# Patient Record
Sex: FEMALE | Race: BLACK OR AFRICAN AMERICAN | URBAN - METROPOLITAN AREA
[De-identification: names, ages, dates, MRNs, and addresses within clinical notes are randomized per-mention and may not be internally consistent; named-entity substitution may affect disease eponyms.]

---

## 2023-02-01 ENCOUNTER — EMERGENCY (EMERGENCY)
Facility: HOSPITAL | Age: 18
LOS: 0 days | Discharge: HOME | End: 2023-02-01
Attending: EMERGENCY MEDICINE | Admitting: EMERGENCY MEDICINE
Payer: MEDICAID

## 2023-02-01 ENCOUNTER — EMERGENCY (EMERGENCY)
Facility: HOSPITAL | Age: 18
LOS: 0 days | Discharge: HOME | End: 2023-02-01
Attending: PEDIATRICS | Admitting: PEDIATRICS
Payer: MEDICAID

## 2023-02-01 VITALS
OXYGEN SATURATION: 100 % | HEART RATE: 69 BPM | RESPIRATION RATE: 20 BRPM | SYSTOLIC BLOOD PRESSURE: 122 MMHG | DIASTOLIC BLOOD PRESSURE: 57 MMHG | TEMPERATURE: 98 F

## 2023-02-01 VITALS
RESPIRATION RATE: 18 BRPM | WEIGHT: 259.93 LBS | HEART RATE: 90 BPM | TEMPERATURE: 98 F | DIASTOLIC BLOOD PRESSURE: 80 MMHG | OXYGEN SATURATION: 99 % | SYSTOLIC BLOOD PRESSURE: 143 MMHG

## 2023-02-01 VITALS
DIASTOLIC BLOOD PRESSURE: 47 MMHG | SYSTOLIC BLOOD PRESSURE: 106 MMHG | OXYGEN SATURATION: 100 % | RESPIRATION RATE: 20 BRPM | TEMPERATURE: 98 F | HEART RATE: 70 BPM

## 2023-02-01 VITALS
HEART RATE: 73 BPM | OXYGEN SATURATION: 100 % | TEMPERATURE: 98 F | DIASTOLIC BLOOD PRESSURE: 58 MMHG | SYSTOLIC BLOOD PRESSURE: 119 MMHG | RESPIRATION RATE: 18 BRPM

## 2023-02-01 DIAGNOSIS — K80.20 CALCULUS OF GALLBLADDER WITHOUT CHOLECYSTITIS WITHOUT OBSTRUCTION: ICD-10-CM

## 2023-02-01 DIAGNOSIS — R74.01 ELEVATION OF LEVELS OF LIVER TRANSAMINASE LEVELS: ICD-10-CM

## 2023-02-01 DIAGNOSIS — R79.89 OTHER SPECIFIED ABNORMAL FINDINGS OF BLOOD CHEMISTRY: ICD-10-CM

## 2023-02-01 DIAGNOSIS — R11.0 NAUSEA: ICD-10-CM

## 2023-02-01 DIAGNOSIS — R10.9 UNSPECIFIED ABDOMINAL PAIN: ICD-10-CM

## 2023-02-01 DIAGNOSIS — R10.13 EPIGASTRIC PAIN: ICD-10-CM

## 2023-02-01 LAB
ALBUMIN SERPL ELPH-MCNC: 4 G/DL — SIGNIFICANT CHANGE UP (ref 3.5–5.2)
ALP SERPL-CCNC: 142 U/L — HIGH (ref 30–115)
ALT FLD-CCNC: 119 U/L — HIGH (ref 14–37)
ANION GAP SERPL CALC-SCNC: 9 MMOL/L — SIGNIFICANT CHANGE UP (ref 7–14)
APPEARANCE UR: CLEAR — SIGNIFICANT CHANGE UP
AST SERPL-CCNC: 173 U/L — HIGH (ref 14–37)
BASOPHILS # BLD AUTO: 0.04 K/UL — SIGNIFICANT CHANGE UP (ref 0–0.2)
BASOPHILS NFR BLD AUTO: 0.4 % — SIGNIFICANT CHANGE UP (ref 0–1)
BILIRUB SERPL-MCNC: 0.7 MG/DL — SIGNIFICANT CHANGE UP (ref 0.2–1.2)
BILIRUB UR-MCNC: NEGATIVE — SIGNIFICANT CHANGE UP
BUN SERPL-MCNC: 10 MG/DL — SIGNIFICANT CHANGE UP (ref 10–20)
CALCIUM SERPL-MCNC: 9.3 MG/DL — SIGNIFICANT CHANGE UP (ref 8.4–10.5)
CHLORIDE SERPL-SCNC: 103 MMOL/L — SIGNIFICANT CHANGE UP (ref 98–110)
CO2 SERPL-SCNC: 27 MMOL/L — SIGNIFICANT CHANGE UP (ref 17–32)
COLOR SPEC: YELLOW — SIGNIFICANT CHANGE UP
CREAT SERPL-MCNC: 0.7 MG/DL — SIGNIFICANT CHANGE UP (ref 0.3–1)
DIFF PNL FLD: NEGATIVE — SIGNIFICANT CHANGE UP
EGFR: 128 ML/MIN/1.73M2 — SIGNIFICANT CHANGE UP
EOSINOPHIL # BLD AUTO: 0.02 K/UL — SIGNIFICANT CHANGE UP (ref 0–0.7)
EOSINOPHIL NFR BLD AUTO: 0.2 % — SIGNIFICANT CHANGE UP (ref 0–8)
GLUCOSE SERPL-MCNC: 83 MG/DL — SIGNIFICANT CHANGE UP (ref 70–99)
GLUCOSE UR QL: NEGATIVE — SIGNIFICANT CHANGE UP
HCG SERPL QL: NEGATIVE — SIGNIFICANT CHANGE UP
HCT VFR BLD CALC: 36.1 % — LOW (ref 37–47)
HGB BLD-MCNC: 12.1 G/DL — SIGNIFICANT CHANGE UP (ref 12–16)
IMM GRANULOCYTES NFR BLD AUTO: 0.7 % — HIGH (ref 0.1–0.3)
KETONES UR-MCNC: NEGATIVE — SIGNIFICANT CHANGE UP
LEUKOCYTE ESTERASE UR-ACNC: NEGATIVE — SIGNIFICANT CHANGE UP
LIDOCAIN IGE QN: 14 U/L — SIGNIFICANT CHANGE UP (ref 7–60)
LYMPHOCYTES # BLD AUTO: 3.08 K/UL — SIGNIFICANT CHANGE UP (ref 1.2–3.4)
LYMPHOCYTES # BLD AUTO: 31.3 % — SIGNIFICANT CHANGE UP (ref 20.5–51.1)
MCHC RBC-ENTMCNC: 26.2 PG — LOW (ref 27–31)
MCHC RBC-ENTMCNC: 33.5 G/DL — SIGNIFICANT CHANGE UP (ref 32–37)
MCV RBC AUTO: 78.1 FL — LOW (ref 81–99)
MONOCYTES # BLD AUTO: 0.62 K/UL — HIGH (ref 0.1–0.6)
MONOCYTES NFR BLD AUTO: 6.3 % — SIGNIFICANT CHANGE UP (ref 1.7–9.3)
NEUTROPHILS # BLD AUTO: 6.01 K/UL — SIGNIFICANT CHANGE UP (ref 1.4–6.5)
NEUTROPHILS NFR BLD AUTO: 61.1 % — SIGNIFICANT CHANGE UP (ref 42.2–75.2)
NITRITE UR-MCNC: NEGATIVE — SIGNIFICANT CHANGE UP
NRBC # BLD: 0 /100 WBCS — SIGNIFICANT CHANGE UP (ref 0–0)
PH UR: 6.5 — SIGNIFICANT CHANGE UP (ref 5–8)
PLATELET # BLD AUTO: 298 K/UL — SIGNIFICANT CHANGE UP (ref 130–400)
POTASSIUM SERPL-MCNC: 4 MMOL/L — SIGNIFICANT CHANGE UP (ref 3.5–5)
POTASSIUM SERPL-SCNC: 4 MMOL/L — SIGNIFICANT CHANGE UP (ref 3.5–5)
PROT SERPL-MCNC: 6.7 G/DL — SIGNIFICANT CHANGE UP (ref 6.1–8)
PROT UR-MCNC: SIGNIFICANT CHANGE UP
RBC # BLD: 4.62 M/UL — SIGNIFICANT CHANGE UP (ref 4.2–5.4)
RBC # FLD: 14.8 % — HIGH (ref 11.5–14.5)
SODIUM SERPL-SCNC: 139 MMOL/L — SIGNIFICANT CHANGE UP (ref 135–146)
SP GR SPEC: 1.02 — SIGNIFICANT CHANGE UP (ref 1.01–1.03)
UROBILINOGEN FLD QL: SIGNIFICANT CHANGE UP
WBC # BLD: 9.84 K/UL — SIGNIFICANT CHANGE UP (ref 4.8–10.8)
WBC # FLD AUTO: 9.84 K/UL — SIGNIFICANT CHANGE UP (ref 4.8–10.8)

## 2023-02-01 PROCEDURE — 99284 EMERGENCY DEPT VISIT MOD MDM: CPT

## 2023-02-01 PROCEDURE — 76705 ECHO EXAM OF ABDOMEN: CPT | Mod: 26

## 2023-02-01 PROCEDURE — 74177 CT ABD & PELVIS W/CONTRAST: CPT | Mod: 26,MA

## 2023-02-01 RX ORDER — SODIUM CHLORIDE 9 MG/ML
1000 INJECTION, SOLUTION INTRAVENOUS ONCE
Refills: 0 | Status: COMPLETED | OUTPATIENT
Start: 2023-02-01 | End: 2023-02-01

## 2023-02-01 RX ORDER — DIATRIZOATE MEGLUMINE 180 MG/ML
30 INJECTION, SOLUTION INTRAVESICAL ONCE
Refills: 0 | Status: COMPLETED | OUTPATIENT
Start: 2023-02-01 | End: 2023-02-01

## 2023-02-01 RX ORDER — ONDANSETRON 8 MG/1
4 TABLET, FILM COATED ORAL ONCE
Refills: 0 | Status: COMPLETED | OUTPATIENT
Start: 2023-02-01 | End: 2023-02-01

## 2023-02-01 RX ORDER — IBUPROFEN 200 MG
400 TABLET ORAL ONCE
Refills: 0 | Status: COMPLETED | OUTPATIENT
Start: 2023-02-01 | End: 2023-02-01

## 2023-02-01 RX ORDER — FAMOTIDINE 10 MG/ML
1 INJECTION INTRAVENOUS
Qty: 14 | Refills: 0
Start: 2023-02-01 | End: 2023-02-07

## 2023-02-01 RX ORDER — FAMOTIDINE 10 MG/ML
20 INJECTION INTRAVENOUS ONCE
Refills: 0 | Status: COMPLETED | OUTPATIENT
Start: 2023-02-01 | End: 2023-02-01

## 2023-02-01 RX ORDER — ONDANSETRON 8 MG/1
1 TABLET, FILM COATED ORAL
Qty: 6 | Refills: 0
Start: 2023-02-01 | End: 2023-02-02

## 2023-02-01 RX ADMIN — DIATRIZOATE MEGLUMINE 30 MILLILITER(S): 180 INJECTION, SOLUTION INTRAVESICAL at 05:18

## 2023-02-01 RX ADMIN — ONDANSETRON 4 MILLIGRAM(S): 8 TABLET, FILM COATED ORAL at 13:02

## 2023-02-01 RX ADMIN — FAMOTIDINE 20 MILLIGRAM(S): 10 INJECTION INTRAVENOUS at 13:02

## 2023-02-01 RX ADMIN — Medication 400 MILLIGRAM(S): at 05:43

## 2023-02-01 RX ADMIN — SODIUM CHLORIDE 1000 MILLILITER(S): 9 INJECTION, SOLUTION INTRAVENOUS at 13:03

## 2023-02-01 NOTE — ED PROVIDER NOTE - NSFOLLOWUPINSTRUCTIONS_ED_ALL_ED_FT
Cholelithiasis    Cholelithiasis is a form of gallbladder disease in which gallstones form in the gallbladder. The gallbladder is an organ that stores bile. Bile is made in the liver, and it helps to digest fats. Gallstones begin as small crystals and slowly grow into stones. They may cause no symptoms until the gallbladder tightens (contracts) and a gallstone is blocking the duct (gallbladder attack), which can cause pain. Cholelithiasis is also referred to as gallstones.  There are two main types of gallstones:  Cholesterol stones. These are made of hardened cholesterol and are usually yellow-green in color. They are the most common type of gallstone. Cholesterol is a white, waxy, fat-like substance that is made in the liver. Pigment stones. These are dark in color and are made of a red-yellow substance that forms when hemoglobin from red blood cells breaks down (bilirubin).What are the causes?  This condition may be caused by an imbalance in the substances that bile is made of. This can happen if the bile:  Has too much bilirubin. Has too much cholesterol. Does not have enough bile salts. These salts help the body absorb and digest fats. In some cases, this condition can also be caused by the gallbladder not emptying completely or often enough.  What increases the risk?  The following factors may make you more likely to develop this condition:  Being female. Having multiple pregnancies. Health care providers sometimes advise removing diseased gallbladders before future pregnancies. Eating a diet that is heavy in fried foods, fat, and refined carbohydrates, like white bread and white rice. Being obese. Being older than age 40.Prolonged use of medicines that contain female hormones (estrogen).Having diabetes mellitus. Rapidly losing weight. Having a family history of gallstones. Being of  or [place] descent. Having an intestinal disease such as Crohn disease. Having metabolic syndrome. Having cirrhosis. Having severe types of anemia such as sickle cell anemia. What are the signs or symptoms?  In most cases, there are no symptoms. These are known as silent gallstones. If a gallstone blocks the bile ducts, it can cause a gallbladder attack. The main symptom of a gallbladder attack is sudden pain in the upper right abdomen. The pain usually comes at night or after eating a large meal. The pain can last for one or several hours and can spread to the right shoulder or chest.  If the bile duct is blocked for more than a few hours, it can cause infection or inflammation of the gallbladder, liver, or pancreas, which may cause:  Nausea. Vomiting. Abdominal pain that lasts for 5 hours or more. Fever or chills. Yellowing of the skin or the whites of the eyes (jaundice).Dark urine. Light-colored stools. How is this diagnosed?  This condition may be diagnosed based on:  A physical exam. Your medical history. An ultrasound of your gallbladder. CT scan. MRI. Blood tests to check for signs of infection or inflammation.A scan of your gallbladder and bile ducts (biliary system) using nonharmful radioactive material and special cameras that can see the radioactive material (cholescintigram). This test checks to see how your gallbladder contracts and whether bile ducts are blocked.Inserting a small tube with a camera on the end (endoscope) through your mouth to inspect bile ducts and check for blockages (endoscopic retrograde cholangiopancreatogram).How is this treated?  Treatment for gallstones depends on the severity of the condition. Silent gallstones do not need treatment. If the gallstones cause a gallbladder attack or other symptoms, treatment may be required. Options for treatment include:  Surgery to remove the gallbladder (cholecystectomy). This is the most common treatment.Medicines to dissolve gallstones. These are most effective at treating small gallstones. You may need to take medicines for up to 6–12 months.Shock wave treatment (extracorporeal biliary lithotripsy). In this treatment, an ultrasound machine sends shock waves to the gallbladder to break gallstones into smaller pieces. These pieces can then be passed into the intestines or be dissolved by medicine. This is rarely used.Removing gallstones through endoscopic retrograde cholangiopancreatogram. A small basket can be attached to the endoscope and used to capture and remove gallstones.Follow these instructions at home:  Take over-the-counter and prescription medicines only as told by your health care provider.Maintain a healthy weight and follow a healthy diet. This includes:  Reducing fatty foods, such as fried food.Reducing refined carbohydrates, like white bread and white rice.Increasing fiber. Aim for foods like almonds, fruit, and beans.Keep all follow-up visits as told by your health care provider. This is important.Contact a health care provider if:  You think you have had a gallbladder attack.You have been diagnosed with silent gallstones and you develop abdominal pain or indigestion.Get help right away if:  You have pain from a gallbladder attack that lasts for more than 2 hours.You have abdominal pain that lasts for more than 5 hours.You have a fever or chills.You have persistent nausea and vomiting.You develop jaundice.You have dark urine or light-colored stools.Summary  Cholelithiasis (also called gallstones) is a form of gallbladder disease in which gallstones form in the gallbladder.This condition is caused by an imbalance in the substances that make up bile. This can happen if the bile has too much cholesterol, too much bilirubin, or not enough bile salts.You are more likely to develop this condition if you are female, pregnant, using medicines with estrogen, obese, older than age 40, or have a family history of gallstones. You may also develop gallstones if you have diabetes, an intestinal disease, cirrhosis, or metabolic syndrome.Treatment for gallstones depends on the severity of the condition. Silent gallstones do not need treatment.If gallstones cause a gallbladder attack or other symptoms, treatment may be needed. The most common treatment is surgery to remove the gallbladder.This information is not intended to replace advice given to you by your health care provider. Make sure you discuss any questions you have with your health care provider.

## 2023-02-01 NOTE — ED PROVIDER NOTE - OBJECTIVE STATEMENT
17 year old female, no past medical history, who presents with abdominal pain. patient evaluated in ED this morning with abdominal pain, +transaminitis and CT AP negative. patient discharged home, states she had worsening abd pain after eating. +associated nausea, no vomiting. no fever, chills, uri symptoms, bowel changes. no hx abd surgeries.

## 2023-02-01 NOTE — ED PROVIDER NOTE - PHYSICAL EXAMINATION
GENERAL: well-appearing, well nourished, no acute distress, obese body habitus  HEENT: NCAT, conjunctiva clear and not injected, sclera non-icteric, EACs clear, TMs nonbulging/nonerythematous, nares patent, mucous membranes moist, pharynx nonerythematous, no cervical lymphadenopathy  HEART: RRR, S1, S2, no rubs, murmurs, or gallops, RP present, cap refill <2 seconds  LUNG: CTAB, no wheezing, no crackles, no retractions, no belly breathing, no tachypnea  ABDOMEN: +BS, soft, TTP in RUQ and RLQ quadrants, nondistended, no hepatomegaly, no splenomegaly, no hernia  NEURO/MSK: grossly intact  SKIN: good turgor, no rash, no bruising or prominent lesions  BACK: spine normal without deformity or tenderness, no CVA tenderness

## 2023-02-01 NOTE — ED PROVIDER NOTE - CARE PROVIDER_API CALL
Rose Chao (FREDDY)  Surgery  [address] [place] [address], [address] [place] [address]  Phone: [phone]  Fax: [phone]  Follow Up Time: 1-3 Days    Tatianna Johns (MD)  Gastroenterology  [address] [place] [address]  Phone: [phone]  Fax: [phone]  Follow Up Time: 1-3 Days

## 2023-02-01 NOTE — ED PROVIDER NOTE - OBJECTIVE STATEMENT
18yo F w/ no PMH, vaccines UTD presenting w/ abdominal pain. Patient states that since early yesterday, she has been experiencing some epigastric pain with radiation to the midback. She states that pain is intermittent, but cannot identify specific triggers. In the afternoon, she tried some "anti-gas pills" and Tylenol for relief, however this evening pain was worsening. LMP finished about 4 days ago. States that she was admitted for workup for pain about 2 months ago. Denies any fevers, dysuria/hematuria, recent illnesses, nausea or vomiting.

## 2023-02-01 NOTE — ED PROVIDER NOTE - PROVIDER TOKENS
PROVIDER:[TOKEN:[[id number]],FOLLOWUP:[1-3 Days]],PROVIDER:[TOKEN:[[id number]],FOLLOWUP:[1-3 Days]]

## 2023-02-01 NOTE — ED PROVIDER NOTE - PROGRESS NOTE DETAILS
surgery consulted patient comfortable, rpt abd exam soft/nt/nd. patient evaluated by surgery, will fu with bariatric surgery.

## 2023-02-01 NOTE — ED PROVIDER NOTE - PATIENT PORTAL LINK FT
You can access the FollowMyHealth Patient Portal offered by [facility] by registering at the following website: [email or web address]. By joining [employer]’s FollowMyHealth portal, you will also be able to view your health information using other applications (apps) compatible with our system.

## 2023-02-01 NOTE — ED PROVIDER NOTE - CARE PROVIDER_API CALL
Jessica Newell)  Surgery  [address] [place] [address]  Phone: [phone]  Fax: [phone]  Follow Up Time: 1-3 Days

## 2023-02-01 NOTE — ED PROVIDER NOTE - ATTENDING CONTRIBUTION TO CARE
I personally evaluated the patient. I reviewed the Resident’s or Physician Assistant’s note (as assigned above), and agree with the findings and plan except as documented in my note.  17-year-old here for ration of epigastric pain pain is  intermittent but stabbing in nature as per child has been evaluated for this in the past no known allergies but extremely uncomfortable today was admitted for same in past but not definitive diagnosis physical exam remarkable for mild epigastric guarding and tenderness will send blood work and evaluate
5

## 2023-02-01 NOTE — ED PROVIDER NOTE - CLINICAL SUMMARY MEDICAL DECISION MAKING FREE TEXT BOX
Bed: 28  Expected date:   Expected time:   Means of arrival:   Comments:  OL 68M fall   Received sign out from overnight team.  18 yo woman with epigastric pain with radiation to mid back.  Workup included labs, UA and CT scan.  Mildly elevated LFT's but with normal bili and minimal alk phos, butg no Gallstones seen on CT scan.  Stable for discharge and outpatient follow up.

## 2023-02-01 NOTE — ED PROVIDER NOTE - PHYSICAL EXAMINATION
CONSTITUTIONAL: non-toxic appearing female, NAD   SKIN: skin exam is warm and dry  ENT: MMM  CARD: S1, S2 normal, no murmur  RESP: No wheezes, rales or rhonchi. Good air movement bilaterally  ABD: soft; non-distended; +RUQ TTP, no rebound/guarding. no CVAT   EXT: Normal ROM  NEURO: awake, alert, following commands, oriented, grossly unremarkable. No Focal deficits. GCS 15.   PSYCH: Cooperative, appropriate.

## 2023-02-01 NOTE — ED PROVIDER NOTE - PATIENT PORTAL LINK FT
You can access the FollowMyHealth Patient Portal offered by [facility] by registering at the following website: [email or web address]. By joining [employer]’s FollowMyHealth portal, you will also be able to view your health information using other applications (apps) compatible with our system.

## 2023-02-01 NOTE — ED PEDIATRIC NURSE NOTE - LOW RISK FALLS INTERVENTIONS (SCORE 7-11)
Orientation to room/Call light is within reach, educate patient/family on its functionality/Environment clear of unused equipment, furniture's in place, clear of hazards/Patient and family education available to parents and patient/Document fall prevention teaching and include in plan of care

## 2023-02-01 NOTE — CONSULT NOTE ADULT - ASSESSMENT
Assessment  17F no PMH/PSH presenting with 1 day history of acute midline back pain with associated nausea, mildly elevated transaminases however at the time of exam minimal abdominal pain    Plan  - patient given instructions to return to ED if F/chills, persistent N/V or abdominal pain  - no surgical intervention  - patient can follow OP with Dr. Newell  - d/w Dr. Hdz

## 2023-02-01 NOTE — ED PROVIDER NOTE - CLINICAL SUMMARY MEDICAL DECISION MAKING FREE TEXT BOX
pt with elevated lfts, cholelithiasis on US. Dominique consulted, pain improved, outpt follow up with surgery. Return precautions given.

## 2023-02-01 NOTE — CONSULT NOTE ADULT - SUBJECTIVE AND OBJECTIVE BOX
ALEJANDRA GARCES [id number]  17y Female    HPI: 17F no PMH/PSH presenting with 1 day history of acute midline back pain with assoicated N, no emesis. No F/chills. She states that this has happened before approx 10/2022, no known history of gallstones. Patients pain started in the AM with no meals prior. She states that she had an episode of heavy EtOH use this weekend.    PAST MEDICAL & SURGICAL HISTORY:    No pertinent past medical history    MEDICATIONS  (STANDING):  MEDICATIONS  (PRN):    Allergies  No Known Allergies  Intolerances    REVIEW OF SYSTEMS  [x] A ten-point review of systems was otherwise negative except as noted.  [ ] Due to altered mental status/intubation, subjective information were not able to be obtained from the patient. History was obtained, to the extent possible, from review of the chart and collateral sources of information.    Vital Signs Last 24 Hrs  T(C): 36.5 (2023 15:44), Max: 36.7 (2023 08:29)  T(F): 97.7 (2023 15:44), Max: 98 (2023 08:29)  HR: 70 (2023 15:44) (69 - 90)  BP: 106/47 (2023 15:44) (106/47 - 143/80)  RR: 20 (2023 15:44) (18 - 20)  SpO2: 100% (2023 15:44) (99% - 100%)    Parameters below as of 2023 15:44  Patient On (Oxygen Delivery Method): room air    PHYSICAL EXAM:  GENERAL: NAD, well-appearing  CHEST/LUNG: Clear to auscultation bilaterally  HEART: Regular rate and rhythm  ABDOMEN: Soft, very mild RUQ tenderness, Nondistended; no rebound or guarding  EXTREMITIES:  No clubbing, cyanosis, or edema    Labs:           12.1   9.84  )-----------( 298      ( 2023 06:25 )             36.1       Auto Neutrophil %: 61.1 % (23 @ 06:25)  Auto Immature Granulocyte %: 0.7 % (23 @ 06:25)      139  |  103  |  10  ----------------------------<  83  4.0   |  27  |  0.7    Calcium, Total Serum: 9.3 mg/dL (23 @ 06:25)      LFTs:         6.7  | 0.7  | 173      ------------------[142     ( 2023 06:25 )  4.0  | x    | 119         Lipase:14     Amylase:x     Coags:    Urinalysis Basic - ( 2023 06:10 )    Color: Yellow / Appearance: Clear / S.022 / pH: x  Gluc: x / Ketone: Negative  / Bili: Negative / Urobili: <2 mg/dL   Blood: x / Protein: Trace / Nitrite: Negative   Leuk Esterase: Negative / RBC: x / WBC x   Sq Epi: x / Non Sq Epi: x / Bacteria: x    RADIOLOGY & ADDITIONAL STUDIES:  < from: US Abdomen Upper Quadrant Right (23 @ 14:38) >  IMPRESSION:    Cholelithiasis without evidence of cholecystitis.    < end of copied text >  < from: CT Abdomen and Pelvis w/ Oral Cont and w/ IV Cont (23 @ 08:29) >    IMPRESSION:    No acute intra-abdominal or pelvic pathology.    < end of copied text >

## 2023-02-01 NOTE — ED PROVIDER NOTE - CARE PROVIDERS DIRECT ADDRESSES
[email or web address]

## 2023-02-01 NOTE — ED ADULT TRIAGE NOTE - CHIEF COMPLAINT QUOTE
pt sts"I have this sharp pain in my upper stomach area and sometimes in the back, it happens every two months or so. I had it checked out at the other hospital and they said nothing is wrong"

## 2023-02-01 NOTE — ED PROVIDER NOTE - ATTENDING CONTRIBUTION TO CARE
16 yo F presents to the ed with return of abd pain and nausea. pt was seen in th ed this morning and neg CT, elevated LFTS. Tolerating po and was discharge. Pt reports she ate a solid meal and pain returned so came back to the ed. NO fevers. No lower abd pain. No dysuria. VS reviewed pt well appearing mild distress due to pain interactive heent eomi perrl no conjunctival injection TM wnl no sign of mastoditis pharynx no erythema or exudates no cervical LAD cvs rrr s1 s2 no murmurs lungs ctabl abd soft ttp to epigastric region  nd no guarding no HSM ext from x 4 skin no rash wwp cap refil <2 neuro exam grossly normal A: Abd pain P: Labs, RUQ US, pain control.

## 2023-02-01 NOTE — ED PROVIDER NOTE - NS ED ATTENDING STATEMENT MOD
I have seen and examined this patient and fully participated in the care of this patient as the teaching attending.  The service was shared with the ROXANNA.  I reviewed and verified the documentation and independently performed the documented:

## 2023-02-02 ENCOUNTER — INPATIENT (INPATIENT)
Facility: HOSPITAL | Age: 18
LOS: 2 days | Discharge: HOME | DRG: 263 | End: 2023-02-05
Attending: SURGERY | Admitting: SURGERY
Payer: MEDICAID

## 2023-02-02 VITALS
WEIGHT: 250 LBS | DIASTOLIC BLOOD PRESSURE: 51 MMHG | RESPIRATION RATE: 16 BRPM | OXYGEN SATURATION: 100 % | TEMPERATURE: 99 F | HEART RATE: 74 BPM | SYSTOLIC BLOOD PRESSURE: 113 MMHG

## 2023-02-02 LAB
ALBUMIN SERPL ELPH-MCNC: 4.2 G/DL — SIGNIFICANT CHANGE UP (ref 3.5–5.2)
ALP SERPL-CCNC: 302 U/L — HIGH (ref 30–115)
ALT FLD-CCNC: 400 U/L — HIGH (ref 14–37)
ANION GAP SERPL CALC-SCNC: 8 MMOL/L — SIGNIFICANT CHANGE UP (ref 7–14)
AST SERPL-CCNC: 260 U/L — HIGH (ref 14–37)
BASOPHILS # BLD AUTO: 0.02 K/UL — SIGNIFICANT CHANGE UP (ref 0–0.2)
BASOPHILS NFR BLD AUTO: 0.4 % — SIGNIFICANT CHANGE UP (ref 0–1)
BILIRUB DIRECT SERPL-MCNC: 1.9 MG/DL — HIGH (ref 0–0.3)
BILIRUB INDIRECT FLD-MCNC: 0.9 MG/DL — SIGNIFICANT CHANGE UP (ref 0.2–1.2)
BILIRUB SERPL-MCNC: 2.8 MG/DL — HIGH (ref 0.2–1.2)
BUN SERPL-MCNC: 6 MG/DL — LOW (ref 10–20)
CALCIUM SERPL-MCNC: 9.7 MG/DL — SIGNIFICANT CHANGE UP (ref 8.4–10.4)
CHLORIDE SERPL-SCNC: 101 MMOL/L — SIGNIFICANT CHANGE UP (ref 98–110)
CO2 SERPL-SCNC: 28 MMOL/L — SIGNIFICANT CHANGE UP (ref 17–32)
CREAT SERPL-MCNC: 0.7 MG/DL — SIGNIFICANT CHANGE UP (ref 0.3–1)
CULTURE RESULTS: SIGNIFICANT CHANGE UP
D DIMER BLD IA.RAPID-MCNC: 283 NG/ML DDU — HIGH
EGFR: 128 ML/MIN/1.73M2 — SIGNIFICANT CHANGE UP
EOSINOPHIL # BLD AUTO: 0.05 K/UL — SIGNIFICANT CHANGE UP (ref 0–0.7)
EOSINOPHIL NFR BLD AUTO: 0.9 % — SIGNIFICANT CHANGE UP (ref 0–8)
GLUCOSE SERPL-MCNC: 94 MG/DL — SIGNIFICANT CHANGE UP (ref 70–99)
HCT VFR BLD CALC: 39.9 % — SIGNIFICANT CHANGE UP (ref 37–47)
HGB BLD-MCNC: 13.2 G/DL — SIGNIFICANT CHANGE UP (ref 12–16)
IMM GRANULOCYTES NFR BLD AUTO: 0.2 % — SIGNIFICANT CHANGE UP (ref 0.1–0.3)
LIDOCAIN IGE QN: 13 U/L — SIGNIFICANT CHANGE UP (ref 7–60)
LYMPHOCYTES # BLD AUTO: 1.94 K/UL — SIGNIFICANT CHANGE UP (ref 1.2–3.4)
LYMPHOCYTES # BLD AUTO: 35.1 % — SIGNIFICANT CHANGE UP (ref 20.5–51.1)
MCHC RBC-ENTMCNC: 26.3 PG — LOW (ref 27–31)
MCHC RBC-ENTMCNC: 33.1 G/DL — SIGNIFICANT CHANGE UP (ref 32–37)
MCV RBC AUTO: 79.6 FL — LOW (ref 81–99)
MONOCYTES # BLD AUTO: 0.46 K/UL — SIGNIFICANT CHANGE UP (ref 0.1–0.6)
MONOCYTES NFR BLD AUTO: 8.3 % — SIGNIFICANT CHANGE UP (ref 1.7–9.3)
NEUTROPHILS # BLD AUTO: 3.05 K/UL — SIGNIFICANT CHANGE UP (ref 1.4–6.5)
NEUTROPHILS NFR BLD AUTO: 55.1 % — SIGNIFICANT CHANGE UP (ref 42.2–75.2)
NRBC # BLD: 0 /100 WBCS — SIGNIFICANT CHANGE UP (ref 0–0)
PLATELET # BLD AUTO: 312 K/UL — SIGNIFICANT CHANGE UP (ref 130–400)
POTASSIUM SERPL-MCNC: 4.5 MMOL/L — SIGNIFICANT CHANGE UP (ref 3.5–5)
POTASSIUM SERPL-SCNC: 4.5 MMOL/L — SIGNIFICANT CHANGE UP (ref 3.5–5)
PROT SERPL-MCNC: 7.9 G/DL — SIGNIFICANT CHANGE UP (ref 6.1–8)
RBC # BLD: 5.01 M/UL — SIGNIFICANT CHANGE UP (ref 4.2–5.4)
RBC # FLD: 15 % — HIGH (ref 11.5–14.5)
SODIUM SERPL-SCNC: 137 MMOL/L — SIGNIFICANT CHANGE UP (ref 135–146)
SPECIMEN SOURCE: SIGNIFICANT CHANGE UP
WBC # BLD: 5.53 K/UL — SIGNIFICANT CHANGE UP (ref 4.8–10.8)
WBC # FLD AUTO: 5.53 K/UL — SIGNIFICANT CHANGE UP (ref 4.8–10.8)

## 2023-02-02 PROCEDURE — 83735 ASSAY OF MAGNESIUM: CPT

## 2023-02-02 PROCEDURE — 85610 PROTHROMBIN TIME: CPT

## 2023-02-02 PROCEDURE — 93005 ELECTROCARDIOGRAM TRACING: CPT

## 2023-02-02 PROCEDURE — 0225U NFCT DS DNA&RNA 21 SARSCOV2: CPT

## 2023-02-02 PROCEDURE — 85730 THROMBOPLASTIN TIME PARTIAL: CPT

## 2023-02-02 PROCEDURE — 36415 COLL VENOUS BLD VENIPUNCTURE: CPT

## 2023-02-02 PROCEDURE — 96374 THER/PROPH/DIAG INJ IV PUSH: CPT

## 2023-02-02 PROCEDURE — C1889: CPT

## 2023-02-02 PROCEDURE — 71045 X-RAY EXAM CHEST 1 VIEW: CPT

## 2023-02-02 PROCEDURE — C9399: CPT

## 2023-02-02 PROCEDURE — 80076 HEPATIC FUNCTION PANEL: CPT

## 2023-02-02 PROCEDURE — 71046 X-RAY EXAM CHEST 2 VIEWS: CPT

## 2023-02-02 PROCEDURE — 84100 ASSAY OF PHOSPHORUS: CPT

## 2023-02-02 PROCEDURE — C1769: CPT

## 2023-02-02 PROCEDURE — 99285 EMERGENCY DEPT VISIT HI MDM: CPT | Mod: 25

## 2023-02-02 PROCEDURE — 80048 BASIC METABOLIC PNL TOTAL CA: CPT

## 2023-02-02 PROCEDURE — 86901 BLOOD TYPING SEROLOGIC RH(D): CPT

## 2023-02-02 PROCEDURE — 86850 RBC ANTIBODY SCREEN: CPT

## 2023-02-02 PROCEDURE — 85025 COMPLETE CBC W/AUTO DIFF WBC: CPT

## 2023-02-02 PROCEDURE — 81025 URINE PREGNANCY TEST: CPT

## 2023-02-02 PROCEDURE — 36000 PLACE NEEDLE IN VEIN: CPT

## 2023-02-02 PROCEDURE — 76705 ECHO EXAM OF ABDOMEN: CPT

## 2023-02-02 PROCEDURE — 86900 BLOOD TYPING SEROLOGIC ABO: CPT

## 2023-02-02 PROCEDURE — 88312 SPECIAL STAINS GROUP 1: CPT

## 2023-02-02 PROCEDURE — 85379 FIBRIN DEGRADATION QUANT: CPT

## 2023-02-02 PROCEDURE — 88304 TISSUE EXAM BY PATHOLOGIST: CPT

## 2023-02-02 PROCEDURE — 88305 TISSUE EXAM BY PATHOLOGIST: CPT

## 2023-02-02 PROCEDURE — 81003 URINALYSIS AUTO W/O SCOPE: CPT

## 2023-02-02 PROCEDURE — 83690 ASSAY OF LIPASE: CPT

## 2023-02-02 PROCEDURE — 76937 US GUIDE VASCULAR ACCESS: CPT | Mod: 26

## 2023-02-02 NOTE — ED PROVIDER NOTE - OBJECTIVE STATEMENT
Pt is a 17y.o Female with no significant PMHx, who presents with chief complaint of abdominal pain. Patient evaluated in ED yesterday with abdominal pain, was found to have +transaminitis with CT AP negative. Ultrasound of RUQ showed cholelithiasis w/o Cholecystitis. Surgery was consulted and recommend no surgery at this time but OP f/u with ED return precautions if persistent abdominal pain. Pt was discharged home, states she had worsening abd pain after eating last night associated with nausea, no vomiting. Pt denies any fever, chills, urinary symptoms, bloody diarrhea. Pt reports 1 episode of diarrhea this morning. Pt describes the pain as sharp intermittent in the RUQ and epigastric area radiating to her right back. Denies any history of abd surgeries.

## 2023-02-02 NOTE — ED ADULT NURSE NOTE - OBJECTIVE STATEMENT
pt c/o epigastric abdominal pain. as per pt she was here yesterday and was found to have something wrong with her liver. pt states pain continued into today and she felt like she should come back.

## 2023-02-02 NOTE — ED PROVIDER NOTE - PHYSICAL EXAMINATION
VITAL SIGNS: noted  CONSTITUTIONAL: Well-developed; well-nourished; in no acute distress  HEAD: Normocephalic; atraumatic  EYES: PERRL, EOM intact; conjunctiva and sclera clear  ENT: No nasal discharge, MMM, oropharynx clear   NECK: Supple; non tender. No anterior cervical lymphadenopathy noted  CARD: S1, S2 normal; no murmurs, gallops, or rubs. Regular rate and rhythm  RESP: CTAB/L, no wheezes, rales or rhonchi  ABD: Normal bowel sounds; soft; non-distended; +epigastric and RUQ tenderness to palpation no guarding or rebounding; no organomegaly. No CVA tenderness  BACK: no midline spinal tenderness. + right paraspinal muscle tenderness. no hypertonicity noted.    EXT: Normal ROM. No calf tenderness or edema. Distal pulses intact  NEURO: Awake and alert, oriented. Grossly unremarkable. No focal deficits.  SKIN: Skin exam is warm and dry, no acute rash

## 2023-02-02 NOTE — ED PROVIDER NOTE - CARE PLAN
1 Principal Discharge DX:	Cholelithiases  Secondary Diagnosis:	Abnormal transaminases  Secondary Diagnosis:	Abdominal pain  Secondary Diagnosis:	Common bile duct dilation

## 2023-02-02 NOTE — ED PROVIDER NOTE - PROGRESS NOTE DETAILS
SG: signed out by Dr. Hawkins. Pt stable. Labs resulted with LFT elevated. Surgery consulted. Pending RUQ US. Will continue to monitor and reassess.

## 2023-02-02 NOTE — ED PROVIDER NOTE - ATTENDING CONTRIBUTION TO CARE
17-year-old female presents for evaluation of abdominal discomfort.  Patient seen in ED twice this week with some transaminitis and negative CT.  Ultrasound with cholelithiasis with no acute cholecystitis noted.  Patient today reports she is having additional intermittent pain that is getting worse after eating with episode of nonbilious nonbloody vomiting no diarrhea, felt fevers, chills, urinary symptoms, shortness of breath, chest pain.  Pain described as sharp and intermittent in the right upper quadrant radiating to epigastric area at times.     VITAL SIGNS: noted  CONSTITUTIONAL: Well-developed; well-nourished; in no acute distress  HEAD: Normocephalic; atraumatic  EYES: PERRL, EOM intact; conjunctiva and sclera clear  ENT: No nasal discharge; airway clear. MMM  NECK: Supple; non tender.    CARD: S1, S2 normal; no murmurs, gallops, or rubs. Regular rate and rhythm  RESP: CTAB/L, no wheezes, rales or rhonchi  ABD: Normal bowel sounds; soft; non-distended; +RUQ and epigastric tenderness, no  rebound or guarding, no lower quadrant ttp; no hepatosplenomegaly. No CVA tenderness  EXT: Normal ROM. No calf tenderness or edema. Distal pulses intact  NEURO: Alert, oriented. Grossly unremarkable. No focal deficits  SKIN: Skin exam is warm and dry, no acute rash  MS: No midline spinal tenderness

## 2023-02-02 NOTE — ED PROVIDER NOTE - CLINICAL SUMMARY MEDICAL DECISION MAKING FREE TEXT BOX
Patient evaluated for right upper quadrant discomfort concerning for biliary colic, labs noted from yesterday and noted to have third visit to ED.  Right upper quadrant and labs repeated and surgery consulted.  LFTs increased, patient treated for acute cholecystitis and admitted to surgery for OR intervention and continued management.

## 2023-02-03 ENCOUNTER — TRANSCRIPTION ENCOUNTER (OUTPATIENT)
Age: 18
End: 2023-02-03

## 2023-02-03 LAB
RAPID RVP RESULT: SIGNIFICANT CHANGE UP
SARS-COV-2 RNA SPEC QL NAA+PROBE: SIGNIFICANT CHANGE UP

## 2023-02-03 PROCEDURE — 76705 ECHO EXAM OF ABDOMEN: CPT | Mod: 26

## 2023-02-03 PROCEDURE — 43264 ERCP REMOVE DUCT CALCULI: CPT

## 2023-02-03 PROCEDURE — 43239 EGD BIOPSY SINGLE/MULTIPLE: CPT | Mod: XU

## 2023-02-03 PROCEDURE — 71046 X-RAY EXAM CHEST 2 VIEWS: CPT | Mod: 26

## 2023-02-03 PROCEDURE — 43262 ENDO CHOLANGIOPANCREATOGRAPH: CPT | Mod: XU

## 2023-02-03 PROCEDURE — 88312 SPECIAL STAINS GROUP 1: CPT | Mod: 26

## 2023-02-03 PROCEDURE — 93010 ELECTROCARDIOGRAM REPORT: CPT

## 2023-02-03 PROCEDURE — 88305 TISSUE EXAM BY PATHOLOGIST: CPT | Mod: 26

## 2023-02-03 RX ORDER — ACETAMINOPHEN 500 MG
650 TABLET ORAL EVERY 6 HOURS
Refills: 0 | Status: DISCONTINUED | OUTPATIENT
Start: 2023-02-03 | End: 2023-02-04

## 2023-02-03 RX ORDER — ENOXAPARIN SODIUM 100 MG/ML
40 INJECTION SUBCUTANEOUS EVERY 24 HOURS
Refills: 0 | Status: DISCONTINUED | OUTPATIENT
Start: 2023-02-03 | End: 2023-02-04

## 2023-02-03 RX ORDER — SODIUM CHLORIDE 9 MG/ML
1000 INJECTION, SOLUTION INTRAVENOUS
Refills: 0 | Status: DISCONTINUED | OUTPATIENT
Start: 2023-02-03 | End: 2023-02-04

## 2023-02-03 RX ORDER — INDOMETHACIN 50 MG
100 CAPSULE ORAL ONCE
Refills: 0 | Status: DISCONTINUED | OUTPATIENT
Start: 2023-02-03 | End: 2023-02-03

## 2023-02-03 RX ORDER — KETOROLAC TROMETHAMINE 30 MG/ML
15 SYRINGE (ML) INJECTION ONCE
Refills: 0 | Status: DISCONTINUED | OUTPATIENT
Start: 2023-02-03 | End: 2023-02-03

## 2023-02-03 RX ORDER — KETOROLAC TROMETHAMINE 30 MG/ML
15 SYRINGE (ML) INJECTION EVERY 6 HOURS
Refills: 0 | Status: DISCONTINUED | OUTPATIENT
Start: 2023-02-03 | End: 2023-02-04

## 2023-02-03 RX ORDER — AMPICILLIN SODIUM AND SULBACTAM SODIUM 250; 125 MG/ML; MG/ML
3 INJECTION, POWDER, FOR SUSPENSION INTRAMUSCULAR; INTRAVENOUS EVERY 6 HOURS
Refills: 0 | Status: DISCONTINUED | OUTPATIENT
Start: 2023-02-03 | End: 2023-02-04

## 2023-02-03 RX ORDER — AMPICILLIN SODIUM AND SULBACTAM SODIUM 250; 125 MG/ML; MG/ML
3 INJECTION, POWDER, FOR SUSPENSION INTRAMUSCULAR; INTRAVENOUS ONCE
Refills: 0 | Status: COMPLETED | OUTPATIENT
Start: 2023-02-03 | End: 2023-02-03

## 2023-02-03 RX ORDER — CHLORHEXIDINE GLUCONATE 213 G/1000ML
1 SOLUTION TOPICAL
Refills: 0 | Status: DISCONTINUED | OUTPATIENT
Start: 2023-02-03 | End: 2023-02-04

## 2023-02-03 RX ORDER — AMPICILLIN SODIUM AND SULBACTAM SODIUM 250; 125 MG/ML; MG/ML
INJECTION, POWDER, FOR SUSPENSION INTRAMUSCULAR; INTRAVENOUS
Refills: 0 | Status: DISCONTINUED | OUTPATIENT
Start: 2023-02-03 | End: 2023-02-04

## 2023-02-03 RX ADMIN — SODIUM CHLORIDE 140 MILLILITER(S): 9 INJECTION, SOLUTION INTRAVENOUS at 17:16

## 2023-02-03 RX ADMIN — SODIUM CHLORIDE 140 MILLILITER(S): 9 INJECTION, SOLUTION INTRAVENOUS at 07:30

## 2023-02-03 RX ADMIN — Medication 15 MILLIGRAM(S): at 01:12

## 2023-02-03 RX ADMIN — Medication 650 MILLIGRAM(S): at 23:57

## 2023-02-03 RX ADMIN — ENOXAPARIN SODIUM 40 MILLIGRAM(S): 100 INJECTION SUBCUTANEOUS at 21:44

## 2023-02-03 RX ADMIN — Medication 650 MILLIGRAM(S): at 18:54

## 2023-02-03 RX ADMIN — Medication 15 MILLIGRAM(S): at 01:42

## 2023-02-03 RX ADMIN — Medication 650 MILLIGRAM(S): at 18:58

## 2023-02-03 RX ADMIN — AMPICILLIN SODIUM AND SULBACTAM SODIUM 200 GRAM(S): 250; 125 INJECTION, POWDER, FOR SUSPENSION INTRAMUSCULAR; INTRAVENOUS at 18:48

## 2023-02-03 RX ADMIN — AMPICILLIN SODIUM AND SULBACTAM SODIUM 200 GRAM(S): 250; 125 INJECTION, POWDER, FOR SUSPENSION INTRAMUSCULAR; INTRAVENOUS at 23:57

## 2023-02-03 RX ADMIN — AMPICILLIN SODIUM AND SULBACTAM SODIUM 200 GRAM(S): 250; 125 INJECTION, POWDER, FOR SUSPENSION INTRAMUSCULAR; INTRAVENOUS at 04:44

## 2023-02-03 NOTE — H&P ADULT - HISTORY OF PRESENT ILLNESS
Patient is a 17 year old female with no PMHx/PSH presenting with 1 day history of acute RUQ abdominal pain that radiates to the back. Associated with nausea but no emesis. Endorses decreased PO intake. Denies fevers/chills. Patient presented to the ED yesterday with similar symptoms, but had resolved symptoms and was discharged. Patient returns to the ED with increasing pain. Denies CP, dysuria, weakness/numbness

## 2023-02-03 NOTE — H&P ADULT - ASSESSMENT
ASSESSMENT:  17yF w/ no significant PMHx/PSH presenting with 1 day history of acute RUQ abdominal pain that radiates to the back. Associated with nausea but no emesis. Endorses decreased PO intake. Physical exam findings, imaging, and labs as documented above.     PLAN:  -Admit to surgical service under Dr. Cuevas   -NPO w/ IVF  -IV Unasyn  -Advance GI consult for possible MRCP   -Pain control prn  -Ambulate as tolerated   -GI ppx     Above plan discussed with Attending Surgeon Dr. Cuevas, patient, patient family, and Primary team  02-03-23 @ 04:19    Green Team Spectra: [phone] ASSESSMENT:  17yF w/ no significant PMHx/PSH presenting with 1 day history of acute RUQ abdominal pain that radiates to the back. Associated with nausea but no emesis. Endorses decreased PO intake. Physical exam findings, imaging, and labs as documented above.     PLAN:  -Admit to surgical service under Dr. Cuevas   -NPO w/ IVF  -IV Unasyn  -Advance GI consult for possible MRCP   -Pain control prn  -Ambulate as tolerated   -GI ppx     Above plan discussed with Attending Surgeon Dr. Cuevas, patient, patient family, and Primary team  02-03-23 @ 04:19    Green Team Spectra: [phone]

## 2023-02-03 NOTE — ED ADULT NURSE REASSESSMENT NOTE - NS ED NURSE REASSESS COMMENT FT1
Pt resting in stretcher, denying any pain at this time. Pt A&Ox4, breathing unlabored and spont on RA, ambulatory w steady gait. Pt pending consult

## 2023-02-03 NOTE — H&P ADULT - NSHPPHYSICALEXAM_GEN_ALL_CORE
PHYSICAL EXAM:  General: NAD, AAOx3, calm and cooperative  HEENT: NCAT, EOMI  Cardiac: S1, S2  Respiratory: normal respiratory effort, b/l breath sounds   Abdomen: Soft, obese, non-distended, moderate RUQ tenderness, no rebound, no guarding  Skin: Warm/dry, normal color, no jaundice

## 2023-02-03 NOTE — CONSULT NOTE ADULT - SUBJECTIVE AND OBJECTIVE BOX
Patient is a 17 year old female with no PMHx/PSH presenting with abdominal pain. She was seen recently on February 1st for the same pain but discharged from the ED after her initial surgical consult. She now returns again for abdominal pain located in the RUQ. Pain is now worse than initial attack. Pain is sharp at times, worsened with meals, with radiation to the back. Pain was only alleviated by IV toradol. She denies fever, chills, jaundice, nor change in bowel habits.       PAST MEDICAL & SURGICAL HISTORY:  No pertinent past medical history  No significant past surgical history    Family Hx:  Father: Non Contributory   Mother: Non Contributory    Social History  Denies Current Tobacco use  Admits to occasional ETOH use  Denies Current Illicit Drug use     MEDICATIONS  (STANDING):  acetaminophen     Tablet .. 650 milliGRAM(s) Oral every 6 hours  ampicillin/sulbactam  IVPB      ampicillin/sulbactam  IVPB 3 Gram(s) IV Intermittent every 6 hours  chlorhexidine 2% Cloths 1 Application(s) Topical <User Schedule>  lactated ringers. 1000 milliLiter(s) (140 mL/Hr) IV Continuous <Continuous>    MEDICATIONS  (PRN):  ketorolac   Injectable 15 milliGRAM(s) IV Push every 6 hours PRN Moderate Pain (4 - 6)      Allergies  No Known Allergies        Review of Systems  General:  Denies Fatigue, Denies Fever, Denies Weakness ,Denies Weight Loss   HEENT: Denies Trouble Swallowing ,Denies  Sore Throat , Denies Change in hearing/vision/speech ,Denies Dizziness    Cardio: Denies  Chest Pain , Palpitations    Respiratory: Denies worsening of SOB, Denies Cough  Abdomen: See detailed HPI  Neuro: Denies Headache Denies Dizziness, Denies Paresthesias  MSK: Denies pain in Bones/Joints/Muscles   Psych: Patient denies depression, denies suicidal or homicidal ideations  Integ: Patient Denies rash, or new skin lesions     Vital Signs Last 24 Hrs  T(C): 36.2 (03 Feb 2023 07:27), Max: 37.1 (02 Feb 2023 19:49)  T(F): 97.1 (03 Feb 2023 07:27), Max: 98.7 (02 Feb 2023 19:49)  HR: 67 (03 Feb 2023 07:27) (67 - 74)  BP: 101/53 (03 Feb 2023 07:27) (101/53 - 113/51)  BP(mean): --  RR: 16 (02 Feb 2023 19:49) (16 - 16)  SpO2: 98% (03 Feb 2023 07:27) (98% - 100%)    Parameters below as of 03 Feb 2023 07:27  Patient On (Oxygen Delivery Method): room air    Physical Exam  Gen: NAD  Head: NC/AT, no visible deformity  ENT: PERRLA, Sclera non icteric   Cardio: S1/S2 No S3/S4, Regular  Resp: CTA B/L  Abdomen: Soft, ND/TTP right upper quadrant   Neuro: AAOx3, Cranial Nerve II-XII intact   Extremities: FROM x 4  Skin: No jaundice, no excoriation       Labs:               13.2   5.53  )-----------( 312      ( 02 Feb 2023 21:57 )             39.9       Auto Neutrophil %: 55.1 % (02-02-23 @ 21:57)  Auto Immature Granulocyte %: 0.2 % (02-02-23 @ 21:57)    02-02    137  |  101  |  6<L>  ----------------------------<  94  4.5   |  28  |  0.7      Calcium, Total Serum: 9.7 mg/dL (02-02-23 @ 21:57)      LFTs:             7.9  | 2.8  | 260      ------------------[302     ( 02 Feb 2023 21:57 )  4.2  | 1.9  | 400         Lipase:13       Culture - Urine (collected 01 Feb 2023 06:10)  Source: Clean Catch Clean Catch (Midstream)  Final Report (02 Feb 2023 10:56):    <10,000 CFU/mL Normal Urogenital Priscilla      RADIOLOGY & ADDITIONAL STUDIES:  US Abdomen Upper Quadrant Right 02.03.23  IMPRESSION:    Cholelithiasis. A positive sonographic Juarez sign was reported. However   no additional grayscale findings of cholecystitis, i.e. no wall   thickening or pericholecystic fluid. There is new minimal dilatation of   the CBD to 7 mm, previously measured at 5 mm 2 days prior. Suspect   possible choledocholithiasis although cholecystitis and   choledocholithiasis both remain in the differential. Recommend further   evaluation with MRCP.

## 2023-02-03 NOTE — CHART NOTE - NSCHARTNOTEFT_GEN_A_CORE
PACU ANESTHESIA ADMISSION NOTE      Procedure:   Post op diagnosis:      ____  Intubated  TV:______       Rate: ______      FiO2: ______    __x__  Patent Airway    __x__  Full return of protective reflexes    _x___  Full recovery from anesthesia / back to baseline     Vitals:   T:   36.2        R:  19                BP: 158/68                Sat:   95                P: 95      Mental Status:  __x__ Awake   ___x__ Alert   _____ Drowsy   _____ Sedated    Nausea/Vomiting:  _x___ NO  ______Yes,   See Post - Op Orders          Pain Scale (0-10):  __x___    Treatment: ____ None    ____ See Post - Op/PCA Orders    Post - Operative Fluids:   _x___ Oral   ____ See Post - Op Orders    Plan: Discharge:   ____Home       x____Floor     _____Critical Care    _____  Other:_________________    Comments: tolerated procedure well

## 2023-02-03 NOTE — CONSULT NOTE ADULT - ASSESSMENT
Patient is a 17 year old female with no PMHx/PSH presenting with abdominal pain. She was seen recently on February 1st for the same pain but discharged from the ED after her initial surgical consult. She now returns again for abdominal pain located in the RUQ. Pain is now worse than initial attack. Pain is sharp at times, worsened with meals, with radiation to the back. PAtient with increas of T chantell to 2.8 along with increase in CBD by 2-3mm in less than 48 hours. Concern as has noted Cholelithiasis that a stone may have entered the common bile duct. Maintain NPO for now. Discussed with patient Endoscopic intervention, ERCP for stone extraction. she was made aware of risk of prcoedure including Pancreatitis and GI bleed.     Cholelithiasis/ CBD increas to 7mm/ T chantell 2.8  - Maintain NPO  - Hold AC  - Plan ERCP with possible EUS prior- although given exam and findings as above concern now for CBD stone    - Needs CCY this admission ideally  - Discussed risk and benefit of Endoscopic intervention with the patient   - Will follow 
none

## 2023-02-03 NOTE — H&P ADULT - NSHPLABSRESULTS_GEN_ALL_CORE
LAB/STUDIES:                        13.2   5.53  )-----------( 312      ( 2023 21:57 )             39.9     02    137  |  101  |  6<L>  ----------------------------<  94  4.5   |  28  |  0.7    Ca    9.7      2023 21:57    TPro  7.9  /  Alb  4.2  /  TBili  2.8<H>  /  DBili  1.9<H>  /  AST  260<H>  /  ALT  400<H>  /  AlkPhos  302<H>      LIVER FUNCTIONS - ( 2023 21:57 )  Alb: 4.2 g/dL / Pro: 7.9 g/dL / ALK PHOS: 302 U/L / ALT: 400 U/L / AST: 260 U/L / GGT: x           Urinalysis Basic - ( 2023 06:10 )    Color: Yellow / Appearance: Clear / S.022 / pH: x  Gluc: x / Ketone: Negative  / Bili: Negative / Urobili: <2 mg/dL   Blood: x / Protein: Trace / Nitrite: Negative   Leuk Esterase: Negative / RBC: x / WBC x   Sq Epi: x / Non Sq Epi: x / Bacteria: x    Culture - Urine (collected 2023 06:10)  Source: Clean Catch Clean Catch (Midstream)  Final Report (2023 10:56):    <10,000 CFU/mL Normal Urogenital Priscilla    IMAGING:  < from: US Abdomen Upper Quadrant Right (23 @ 01:15) >  IMPRESSION:  Cholelithiasis. A positive sonographic Juarez sign was reported. However   no additional grayscale findings of cholecystitis, i.e. no wall   thickening or pericholecystic fluid. There is new minimal dilatation of   the CBD to 7 mm, previously measured at 5 mm 2 days prior. Suspect   possible choledocholithiasis although cholecystitis and   choledocholithiasis both remain in the differential. Recommend further   evaluation with MRCP.  --- End of Report ---

## 2023-02-04 ENCOUNTER — TRANSCRIPTION ENCOUNTER (OUTPATIENT)
Age: 18
End: 2023-02-04

## 2023-02-04 LAB
ALBUMIN SERPL ELPH-MCNC: 3.9 G/DL — SIGNIFICANT CHANGE UP (ref 3.5–5.2)
ALP SERPL-CCNC: 269 U/L — HIGH (ref 30–115)
ALT FLD-CCNC: 233 U/L — HIGH (ref 14–37)
ANION GAP SERPL CALC-SCNC: 14 MMOL/L — SIGNIFICANT CHANGE UP (ref 7–14)
APPEARANCE UR: CLEAR — SIGNIFICANT CHANGE UP
APTT BLD: 38.5 SEC — SIGNIFICANT CHANGE UP (ref 27–39.2)
AST SERPL-CCNC: 78 U/L — HIGH (ref 14–37)
BASOPHILS # BLD AUTO: 0.01 K/UL — SIGNIFICANT CHANGE UP (ref 0–0.2)
BASOPHILS NFR BLD AUTO: 0.1 % — SIGNIFICANT CHANGE UP (ref 0–1)
BILIRUB DIRECT SERPL-MCNC: 0.3 MG/DL — SIGNIFICANT CHANGE UP (ref 0–0.3)
BILIRUB INDIRECT FLD-MCNC: 0.6 MG/DL — SIGNIFICANT CHANGE UP (ref 0.2–1.2)
BILIRUB SERPL-MCNC: 0.9 MG/DL — SIGNIFICANT CHANGE UP (ref 0.2–1.2)
BILIRUB UR-MCNC: NEGATIVE — SIGNIFICANT CHANGE UP
BLD GP AB SCN SERPL QL: SIGNIFICANT CHANGE UP
BUN SERPL-MCNC: 8 MG/DL — LOW (ref 10–20)
CALCIUM SERPL-MCNC: 9.6 MG/DL — SIGNIFICANT CHANGE UP (ref 8.4–10.5)
CHLORIDE SERPL-SCNC: 104 MMOL/L — SIGNIFICANT CHANGE UP (ref 98–110)
CO2 SERPL-SCNC: 22 MMOL/L — SIGNIFICANT CHANGE UP (ref 17–32)
COLOR SPEC: SIGNIFICANT CHANGE UP
CREAT SERPL-MCNC: 0.6 MG/DL — SIGNIFICANT CHANGE UP (ref 0.3–1)
DIFF PNL FLD: NEGATIVE — SIGNIFICANT CHANGE UP
EGFR: 133 ML/MIN/1.73M2 — SIGNIFICANT CHANGE UP
EOSINOPHIL # BLD AUTO: 0 K/UL — SIGNIFICANT CHANGE UP (ref 0–0.7)
EOSINOPHIL NFR BLD AUTO: 0 % — SIGNIFICANT CHANGE UP (ref 0–8)
GLUCOSE SERPL-MCNC: 89 MG/DL — SIGNIFICANT CHANGE UP (ref 70–99)
GLUCOSE UR QL: NEGATIVE — SIGNIFICANT CHANGE UP
HCG UR QL: NEGATIVE — SIGNIFICANT CHANGE UP
HCT VFR BLD CALC: 35.4 % — LOW (ref 37–47)
HGB BLD-MCNC: 11.9 G/DL — LOW (ref 12–16)
IMM GRANULOCYTES NFR BLD AUTO: 0.4 % — HIGH (ref 0.1–0.3)
INR BLD: 1.25 RATIO — SIGNIFICANT CHANGE UP (ref 0.65–1.3)
KETONES UR-MCNC: ABNORMAL
LEUKOCYTE ESTERASE UR-ACNC: NEGATIVE — SIGNIFICANT CHANGE UP
LIDOCAIN IGE QN: 25 U/L — SIGNIFICANT CHANGE UP (ref 7–60)
LIDOCAIN IGE QN: 27 U/L — SIGNIFICANT CHANGE UP (ref 7–60)
LYMPHOCYTES # BLD AUTO: 0.8 K/UL — LOW (ref 1.2–3.4)
LYMPHOCYTES # BLD AUTO: 9.4 % — LOW (ref 20.5–51.1)
MAGNESIUM SERPL-MCNC: 1.9 MG/DL — SIGNIFICANT CHANGE UP (ref 1.8–2.4)
MCHC RBC-ENTMCNC: 26 PG — LOW (ref 27–31)
MCHC RBC-ENTMCNC: 33.6 G/DL — SIGNIFICANT CHANGE UP (ref 32–37)
MCV RBC AUTO: 77.5 FL — LOW (ref 81–99)
MONOCYTES # BLD AUTO: 0.13 K/UL — SIGNIFICANT CHANGE UP (ref 0.1–0.6)
MONOCYTES NFR BLD AUTO: 1.5 % — LOW (ref 1.7–9.3)
NEUTROPHILS # BLD AUTO: 7.56 K/UL — HIGH (ref 1.4–6.5)
NEUTROPHILS NFR BLD AUTO: 88.6 % — HIGH (ref 42.2–75.2)
NITRITE UR-MCNC: NEGATIVE — SIGNIFICANT CHANGE UP
NRBC # BLD: 0 /100 WBCS — SIGNIFICANT CHANGE UP (ref 0–0)
PH UR: 6.5 — SIGNIFICANT CHANGE UP (ref 5–8)
PHOSPHATE SERPL-MCNC: 3.8 MG/DL — SIGNIFICANT CHANGE UP (ref 2.1–4.9)
PLATELET # BLD AUTO: 302 K/UL — SIGNIFICANT CHANGE UP (ref 130–400)
POTASSIUM SERPL-MCNC: 5.1 MMOL/L — HIGH (ref 3.5–5)
POTASSIUM SERPL-SCNC: 5.1 MMOL/L — HIGH (ref 3.5–5)
PROT SERPL-MCNC: 6.8 G/DL — SIGNIFICANT CHANGE UP (ref 6.1–8)
PROT UR-MCNC: SIGNIFICANT CHANGE UP
PROTHROM AB SERPL-ACNC: 14.3 SEC — HIGH (ref 9.95–12.87)
RBC # BLD: 4.57 M/UL — SIGNIFICANT CHANGE UP (ref 4.2–5.4)
RBC # FLD: 14.8 % — HIGH (ref 11.5–14.5)
SODIUM SERPL-SCNC: 140 MMOL/L — SIGNIFICANT CHANGE UP (ref 135–146)
SP GR SPEC: 1.02 — SIGNIFICANT CHANGE UP (ref 1.01–1.03)
UROBILINOGEN FLD QL: SIGNIFICANT CHANGE UP
WBC # BLD: 8.53 K/UL — SIGNIFICANT CHANGE UP (ref 4.8–10.8)
WBC # FLD AUTO: 8.53 K/UL — SIGNIFICANT CHANGE UP (ref 4.8–10.8)

## 2023-02-04 PROCEDURE — 88304 TISSUE EXAM BY PATHOLOGIST: CPT | Mod: 26

## 2023-02-04 PROCEDURE — 99232 SBSQ HOSP IP/OBS MODERATE 35: CPT

## 2023-02-04 RX ORDER — HYDROMORPHONE HYDROCHLORIDE 2 MG/ML
1 INJECTION INTRAMUSCULAR; INTRAVENOUS; SUBCUTANEOUS
Refills: 0 | Status: DISCONTINUED | OUTPATIENT
Start: 2023-02-04 | End: 2023-02-04

## 2023-02-04 RX ORDER — ENOXAPARIN SODIUM 100 MG/ML
40 INJECTION SUBCUTANEOUS EVERY 24 HOURS
Refills: 0 | Status: DISCONTINUED | OUTPATIENT
Start: 2023-02-04 | End: 2023-02-05

## 2023-02-04 RX ORDER — SODIUM CHLORIDE 9 MG/ML
1000 INJECTION, SOLUTION INTRAVENOUS
Refills: 0 | Status: DISCONTINUED | OUTPATIENT
Start: 2023-02-04 | End: 2023-02-04

## 2023-02-04 RX ORDER — KETOROLAC TROMETHAMINE 30 MG/ML
15 SYRINGE (ML) INJECTION EVERY 6 HOURS
Refills: 0 | Status: DISCONTINUED | OUTPATIENT
Start: 2023-02-04 | End: 2023-02-05

## 2023-02-04 RX ORDER — MEPERIDINE HYDROCHLORIDE 50 MG/ML
12.5 INJECTION INTRAMUSCULAR; INTRAVENOUS; SUBCUTANEOUS
Refills: 0 | Status: DISCONTINUED | OUTPATIENT
Start: 2023-02-04 | End: 2023-02-04

## 2023-02-04 RX ORDER — SODIUM CHLORIDE 9 MG/ML
1000 INJECTION, SOLUTION INTRAVENOUS
Refills: 0 | Status: DISCONTINUED | OUTPATIENT
Start: 2023-02-04 | End: 2023-02-05

## 2023-02-04 RX ORDER — ACETAMINOPHEN 500 MG
650 TABLET ORAL EVERY 6 HOURS
Refills: 0 | Status: DISCONTINUED | OUTPATIENT
Start: 2023-02-04 | End: 2023-02-05

## 2023-02-04 RX ORDER — HYDROMORPHONE HYDROCHLORIDE 2 MG/ML
0.5 INJECTION INTRAMUSCULAR; INTRAVENOUS; SUBCUTANEOUS
Refills: 0 | Status: DISCONTINUED | OUTPATIENT
Start: 2023-02-04 | End: 2023-02-04

## 2023-02-04 RX ORDER — ONDANSETRON 8 MG/1
4 TABLET, FILM COATED ORAL ONCE
Refills: 0 | Status: DISCONTINUED | OUTPATIENT
Start: 2023-02-04 | End: 2023-02-04

## 2023-02-04 RX ADMIN — Medication 15 MILLIGRAM(S): at 15:15

## 2023-02-04 RX ADMIN — Medication 15 MILLIGRAM(S): at 09:03

## 2023-02-04 RX ADMIN — ENOXAPARIN SODIUM 40 MILLIGRAM(S): 100 INJECTION SUBCUTANEOUS at 21:53

## 2023-02-04 RX ADMIN — Medication 15 MILLIGRAM(S): at 15:03

## 2023-02-04 RX ADMIN — Medication 650 MILLIGRAM(S): at 05:36

## 2023-02-04 RX ADMIN — SODIUM CHLORIDE 140 MILLILITER(S): 9 INJECTION, SOLUTION INTRAVENOUS at 01:56

## 2023-02-04 RX ADMIN — Medication 650 MILLIGRAM(S): at 00:27

## 2023-02-04 RX ADMIN — Medication 650 MILLIGRAM(S): at 12:41

## 2023-02-04 RX ADMIN — Medication 650 MILLIGRAM(S): at 23:45

## 2023-02-04 RX ADMIN — SODIUM CHLORIDE 150 MILLILITER(S): 9 INJECTION, SOLUTION INTRAVENOUS at 19:06

## 2023-02-04 RX ADMIN — Medication 15 MILLIGRAM(S): at 09:18

## 2023-02-04 RX ADMIN — Medication 15 MILLIGRAM(S): at 01:56

## 2023-02-04 RX ADMIN — AMPICILLIN SODIUM AND SULBACTAM SODIUM 200 GRAM(S): 250; 125 INJECTION, POWDER, FOR SUSPENSION INTRAMUSCULAR; INTRAVENOUS at 05:35

## 2023-02-04 RX ADMIN — Medication 15 MILLIGRAM(S): at 21:52

## 2023-02-04 RX ADMIN — Medication 15 MILLIGRAM(S): at 22:34

## 2023-02-04 RX ADMIN — AMPICILLIN SODIUM AND SULBACTAM SODIUM 200 GRAM(S): 250; 125 INJECTION, POWDER, FOR SUSPENSION INTRAMUSCULAR; INTRAVENOUS at 12:42

## 2023-02-04 RX ADMIN — Medication 15 MILLIGRAM(S): at 02:26

## 2023-02-04 RX ADMIN — Medication 650 MILLIGRAM(S): at 13:00

## 2023-02-04 RX ADMIN — Medication 650 MILLIGRAM(S): at 06:06

## 2023-02-04 NOTE — BRIEF OPERATIVE NOTE - NSICDXBRIEFPREOP_GEN_ALL_CORE_FT
PRE-OP DIAGNOSIS:  Acute cholecystitis due to biliary calculus 04-Feb-2023 19:16:06  Jean-Pierre Bah

## 2023-02-04 NOTE — PROGRESS NOTE ADULT - SUBJECTIVE AND OBJECTIVE BOX
Gastroenterology progress note:     Patient is a 17y old  Female who presents with a chief complaint of cholecystitis (04 Feb 2023 03:38)       Admitted on: 02-03-23    We are following the patient for choledocholithiasis      Interval History: s/p EUS with CBD 7mm with filling defect, s/p ERCP with biliary sphincterotomy and sludge removal: cholangiogram showed a filling defect with a dilated CBD. Multiple sweeps were made with removal of sludge    PAST MEDICAL & SURGICAL HISTORY:  No pertinent past medical history  No significant past surgical history    MEDICATIONS  (STANDING):  acetaminophen     Tablet .. 650 milliGRAM(s) Oral every 6 hours  ampicillin/sulbactam  IVPB      ampicillin/sulbactam  IVPB 3 Gram(s) IV Intermittent every 6 hours  enoxaparin Injectable 40 milliGRAM(s) SubCutaneous every 24 hours  lactated ringers. 1000 milliLiter(s) (140 mL/Hr) IV Continuous <Continuous>    MEDICATIONS  (PRN):  ketorolac   Injectable 15 milliGRAM(s) IV Push every 6 hours PRN Moderate Pain (4 - 6)      Allergies  No Known Allergies      Review of Systems:   General: no fever  HEENT: no hemoptysis  Cardiovascular:  No Chest Pain, No Palpitations  Respiratory:  No Cough, No Dyspnea  Gastrointestinal:  As described in HPI  Hematology: no bruising or hematoma   Neurology: no new motor deficit  Skin: no new rash    Physical Examination:  T(C): 36.4 (02-04-23 @ 07:40), Max: 37 (02-03-23 @ 19:55)  HR: 51 (02-04-23 @ 07:40) (51 - 94)  BP: 106/60 (02-04-23 @ 07:40) (93/55 - 160/67)  RR: 20 (02-04-23 @ 07:40) (15 - 20)  SpO2: 100% (02-04-23 @ 07:40) (93% - 100%)  Weight (kg): 108.9 (02-04-23 @ 04:00)    Constitutional: No acute distress.  Head: normocephalic  Neck: supple   Eyes: EOMI  Respiratory:  No signs of respiratory distress. Lung sounds are clear bilaterally.  Cardiovascular:  S1 S2, Regular rate and rhythm.  Abdominal: Abdomen is soft, symmetric, and non-tender without distention. There are no visible lesions or scars. Bowel sounds are present and normoactive in all four quadrants. No masses, hepatomegaly, or splenomegaly are noted.   Extremities: no pitting edema  Neurology: alert oriented *3, no asterixis   Skin: No rashes, No Jaundice.      Data: (reviewed by attending)                        11.9   8.53  )-----------( 302      ( 03 Feb 2023 22:31 )             35.4     Hgb trend:  11.9  02-03-23 @ 22:31  13.2  02-02-23 @ 21:57        02-03    140  |  104  |  8<L>  ----------------------------<  89  5.1<H>   |  22  |  0.6    Ca    9.6      03 Feb 2023 22:31  Phos  3.8     02-03  Mg     1.9     02-03    TPro  6.8  /  Alb  3.9  /  TBili  0.9  /  DBili  0.3  /  AST  78<H>  /  ALT  233<H>  /  AlkPhos  269<H>  02-03    Liver panel trend:  TBili 0.9   /   AST 78   /      /   AlkP 269   /   Tptn 6.8   /   Alb 3.9    /   DBili 0.3      02-03  TBili 2.8   /      /      /   AlkP 302   /   Tptn 7.9   /   Alb 4.2    /   DBili 1.9      02-02  TBili 0.7   /      /      /   AlkP 142   /   Tptn 6.7   /   Alb 4.0    /   DBili --      02-01      PT/INR - ( 04 Feb 2023 02:33 )   PT: 14.30 sec;   INR: 1.25 ratio         PTT - ( 04 Feb 2023 02:33 )  PTT:38.5 sec   Gastroenterology progress note:     Patient is a 17y old  Female who presents with a chief complaint of cholecystitis (04 Feb 2023 03:38)       Admitted on: 02-03-23    We are following the patient for choledocholithiasis      Interval History: s/p EUS with CBD 7mm with filling defect, s/p ERCP with biliary sphincterotomy and sludge removal: cholangiogram showed a filling defect with a dilated CBD. Multiple sweeps were made with removal of sludge  patient seen this morning complains of acid reflux, no epigastric pain  she is NPO  plan for CCY today     PAST MEDICAL & SURGICAL HISTORY:  No pertinent past medical history  No significant past surgical history    MEDICATIONS  (STANDING):  acetaminophen     Tablet .. 650 milliGRAM(s) Oral every 6 hours  ampicillin/sulbactam  IVPB      ampicillin/sulbactam  IVPB 3 Gram(s) IV Intermittent every 6 hours  enoxaparin Injectable 40 milliGRAM(s) SubCutaneous every 24 hours  lactated ringers. 1000 milliLiter(s) (140 mL/Hr) IV Continuous <Continuous>    MEDICATIONS  (PRN):  ketorolac   Injectable 15 milliGRAM(s) IV Push every 6 hours PRN Moderate Pain (4 - 6)      Allergies  No Known Allergies      Review of Systems:   General: no fever  HEENT: sore throat   Cardiovascular:  No Chest Pain, No Palpitations  Respiratory:  No Cough, No Dyspnea  Gastrointestinal:  As described in HPI  Hematology: no bruising or hematoma   Neurology: no new motor deficit  Skin: no new rash    Physical Examination:  T(C): 36.4 (02-04-23 @ 07:40), Max: 37 (02-03-23 @ 19:55)  HR: 51 (02-04-23 @ 07:40) (51 - 94)  BP: 106/60 (02-04-23 @ 07:40) (93/55 - 160/67)  RR: 20 (02-04-23 @ 07:40) (15 - 20)  SpO2: 100% (02-04-23 @ 07:40) (93% - 100%)  Weight (kg): 108.9 (02-04-23 @ 04:00)    Constitutional: No acute distress.  Head: normocephalic  Neck: supple   Eyes: EOMI  Respiratory:  No signs of respiratory distress. Lung sounds are clear bilaterally.  Cardiovascular:  S1 S2, Regular rate and rhythm.  Abdominal: Abdomen is soft, symmetric, and non-tender without distention.    Extremities: no pitting edema  Neurology: alert oriented *3, no asterixis   Skin: No rashes, No Jaundice.      Data: (reviewed by attending)                        11.9   8.53  )-----------( 302      ( 03 Feb 2023 22:31 )             35.4     Hgb trend:  11.9  02-03-23 @ 22:31  13.2  02-02-23 @ 21:57        02-03    140  |  104  |  8<L>  ----------------------------<  89  5.1<H>   |  22  |  0.6    Ca    9.6      03 Feb 2023 22:31  Phos  3.8     02-03  Mg     1.9     02-03    TPro  6.8  /  Alb  3.9  /  TBili  0.9  /  DBili  0.3  /  AST  78<H>  /  ALT  233<H>  /  AlkPhos  269<H>  02-03    Liver panel trend:  TBili 0.9   /   AST 78   /      /   AlkP 269   /   Tptn 6.8   /   Alb 3.9    /   DBili 0.3      02-03  TBili 2.8   /      /      /   AlkP 302   /   Tptn 7.9   /   Alb 4.2    /   DBili 1.9      02-02  TBili 0.7   /      /      /   AlkP 142   /   Tptn 6.7   /   Alb 4.0    /   DBili --      02-01      PT/INR - ( 04 Feb 2023 02:33 )   PT: 14.30 sec;   INR: 1.25 ratio         PTT - ( 04 Feb 2023 02:33 )  PTT:38.5 sec

## 2023-02-04 NOTE — PROGRESS NOTE ADULT - SUBJECTIVE AND OBJECTIVE BOX
GENERAL SURGERY PROGRESS NOTE    Patient: ALEJANDRA GARCES , 17y ([birth year])Female   MRN: [id number]  Location: [address]  Visit: 23 Inpatient  Date: 23 @ 03:39    Events of past 24 hours: Underwent ERCP with sphincterotomy and sludge removal with [facility]. Tolerating CLD post-op.    PAST MEDICAL & SURGICAL HISTORY:  No pertinent past medical history      No significant past surgical history      Vitals:   T(F): 98.4 (23 @ 23:05), Max: 98.6 (23 @ 19:55)  HR: 61 (23 @ 23:05)  BP: 105/51 (23 @ 23:05)  RR: 18 (23 @ 23:05)  SpO2: 100% (23 @ 23:05)      Diet, NPO after Midnight:      NPO Start Date: 2023,   NPO Start Time: 23:59  Diet, Clear Liquid      Fluids: lactated ringers.: Solution, 1000 milliLiter(s) infuse at 140 mL/Hr      PHYSICAL EXAM:  General: NAD, AAOx3, calm and cooperative  Cardiac: S1, S2 present  Respiratory: No labored breathing, normal respiratory effort  Abdomen: Soft, non-distended, non-tender, no rebound, no guarding.   Skin: Warm/dry, normal color, no jaundice    MEDICATIONS  (STANDING):  acetaminophen     Tablet .. 650 milliGRAM(s) Oral every 6 hours  ampicillin/sulbactam  IVPB      ampicillin/sulbactam  IVPB 3 Gram(s) IV Intermittent every 6 hours  enoxaparin Injectable 40 milliGRAM(s) SubCutaneous every 24 hours  lactated ringers. 1000 milliLiter(s) (140 mL/Hr) IV Continuous <Continuous>    MEDICATIONS  (PRN):  ketorolac   Injectable 15 milliGRAM(s) IV Push every 6 hours PRN Moderate Pain (4 - 6)      DVT PROPHYLAXIS: enoxaparin Injectable 40 milliGRAM(s) SubCutaneous every 24 hours    GI PROPHYLAXIS:   ANTICOAGULATION:   ANTIBIOTICS:  ampicillin/sulbactam  IVPB    ampicillin/sulbactam  IVPB 3 Gram(s)            LAB/STUDIES:  Labs:  CAPILLARY BLOOD GLUCOSE                              11.9   8.53  )-----------( 302      ( 2023 22:31 )             35.4       Auto Neutrophil %: 88.6 % (23 @ 22:31)  Auto Immature Granulocyte %: 0.4 % (23 @ 22:31)        140  |  104  |  8<L>  ----------------------------<  89  5.1<H>   |  22  |  0.6      Calcium, Total Serum: 9.6 mg/dL (23 @ 22:31)      LFTs:             6.8  | 0.9  | 78       ------------------[269     ( 2023 22:31 )  3.9  | 0.3  | 233         Lipase:27     Amylase:x             Coags:     14.30  ----< 1.25    ( 2023 02:33 )     38.5          Urinalysis Basic - ( 2023 01:18 )    Color: Light Yellow / Appearance: Clear / S.020 / pH: x  Gluc: x / Ketone: Moderate  / Bili: Negative / Urobili: <2 mg/dL   Blood: x / Protein: Trace / Nitrite: Negative   Leuk Esterase: Negative / RBC: x / WBC x   Sq Epi: x / Non Sq Epi: x / Bacteria: x        Culture - Urine (collected 2023 06:10)  Source: Clean Catch Clean Catch (Midstream)  Final Report (2023 10:56):    <10,000 CFU/mL Normal Urogenital Priscilla      IMAGING:    < from: ERCP w/ EUS (23 @ 15:06) >  EGD: non erosive gastritis (biopsies), antral nodule mostly likely represent  pancreatic rest (biopsies).    EUS focused examination of the common bile duct: there was a hyperechoic, non  shadowing defect was seen in the common bile duct representing biliary sludge.  CBD was dilated and measured 7 mm. .    ERCP with biliary sphincterotomy and sludge removal: cholangiogram showed a  filling defect with a dilated CBD. Multiple sweeps were made with removal of  sludge.

## 2023-02-04 NOTE — PRE-ANESTHESIA EVALUATION ADULT - NSANTHPMHFT_GEN_ALL_CORE
obese  possible KIKI  marijuana smokier  lungs clear, no murmur morbid obese  possible KIKI  marijuana smokier  lungs clear, no murmur

## 2023-02-04 NOTE — BRIEF OPERATIVE NOTE - NSICDXBRIEFPOSTOP_GEN_ALL_CORE_FT
POST-OP DIAGNOSIS:  Acute cholecystitis due to biliary calculus 04-Feb-2023 19:16:10  Jean-Pierre Bah

## 2023-02-04 NOTE — PROGRESS NOTE ADULT - ATTENDING COMMENTS
Patient is seen and examined, has no complaints, LFTs are improving. Plan for Lap cholecystectomy today. Will sign off, please call us for any questions.

## 2023-02-04 NOTE — CHART NOTE - NSCHARTNOTEFT_GEN_A_CORE
PACU ANESTHESIA ADMISSION NOTE      Procedure: laparoscopic cholecystectomy  Post op diagnosis:  cholecystitis      __x__  Patent Airway    __x__  Full return of protective reflexes    ____  Full recovery from anesthesia / back to baseline     Vitals:   see anesthesia record      Mental Status:  __x__ Awake   _____ Alert   _____ Drowsy   _____ Sedated    Nausea/Vomiting:  _x___ NO  ______Yes,   See Post - Op Orders          Pain Scale (0-10):  _____    Treatment: ____ None    ___x_ See Post - Op/PCA Orders    Post - Operative Fluids:   ____ Oral   __x__ See Post - Op Orders    Plan: Discharge:   ____Home       _x____Floor     _____Critical Care    _____  Other:_________________    Comments:  Uneventful intraoperative course. No anesthesia issues or complications noted. Patient stable upon arrival to PACU. Report given to RN.

## 2023-02-05 ENCOUNTER — TRANSCRIPTION ENCOUNTER (OUTPATIENT)
Age: 18
End: 2023-02-05

## 2023-02-05 VITALS
RESPIRATION RATE: 20 BRPM | SYSTOLIC BLOOD PRESSURE: 107 MMHG | OXYGEN SATURATION: 99 % | HEART RATE: 65 BPM | TEMPERATURE: 98 F | DIASTOLIC BLOOD PRESSURE: 57 MMHG

## 2023-02-05 DIAGNOSIS — K80.20 CALCULUS OF GALLBLADDER WITHOUT CHOLECYSTITIS WITHOUT OBSTRUCTION: ICD-10-CM

## 2023-02-05 DIAGNOSIS — F44.5 CONVERSION DISORDER WITH SEIZURES OR CONVULSIONS: ICD-10-CM

## 2023-02-05 RX ADMIN — Medication 650 MILLIGRAM(S): at 00:15

## 2023-02-05 RX ADMIN — SODIUM CHLORIDE 140 MILLILITER(S): 9 INJECTION, SOLUTION INTRAVENOUS at 05:20

## 2023-02-05 RX ADMIN — Medication 650 MILLIGRAM(S): at 06:00

## 2023-02-05 RX ADMIN — Medication 650 MILLIGRAM(S): at 05:20

## 2023-02-05 NOTE — PROGRESS NOTE ADULT - SUBJECTIVE AND OBJECTIVE BOX
GENERAL SURGERY PROGRESS NOTE    Patient: ALEJANDRA GARCES , 17y (03)Female   MRN: [id number]  Location: [address]  Visit: 23 Inpatient  Date: 23 @ 01:44    Hospital Day #: 3  Post-Op Day #:    Procedure/Dx/Injuries: s/p lap elmer    Events of past 24 hours: s/p lap elmer, ambulating, tolerating diet. Will plan on discharge to home today.     PAST MEDICAL & SURGICAL HISTORY:  No pertinent past medical history      No significant past surgical history          Vitals:   T(F): 97.7 (23 @ 23:25), Max: 98.2 (23 @ 16:30)  HR: 71 (23 @ 23:25)  BP: 104/69 (23 @ 23:25)  RR: 18 (23 @ 23:25)  SpO2: 98% (23 @ 23:25)      Diet, Regular:   Low Fat (LOWFAT)      Fluids: lactated ringers.: Solution, 1000 milliLiter(s) infuse at 140 mL/Hr      I & O's:    23 @ 07:01  -  23 @ 07:00  --------------------------------------------------------  IN:    IV PiggyBack: 100 mL    IV PiggyBack: 200 mL    Lactated Ringers: 1960 mL    Oral Fluid: 720 mL  Total IN: 2980 mL    OUT:    Voided (mL): 300 mL    Voided (mL): 1900 mL  Total OUT: 2200 mL    Total NET: 780 mL    PHYSICAL EXAM:  GENERAL: NAD, well-appearing  CHEST/LUNG: Clear to auscultation bilaterally  HEART: Regular rate and rhythm  ABDOMEN: Soft, Nontender, Nondistended; Surgical dressings intact.  EXTREMITIES:  No clubbing, cyanosis, or edema      MEDICATIONS  (STANDING):  acetaminophen     Tablet .. 650 milliGRAM(s) Oral every 6 hours  enoxaparin Injectable 40 milliGRAM(s) SubCutaneous every 24 hours  lactated ringers. 1000 milliLiter(s) (140 mL/Hr) IV Continuous <Continuous>    MEDICATIONS  (PRN):  ketorolac   Injectable 15 milliGRAM(s) IV Push every 6 hours PRN Moderate Pain (4 - 6)      DVT PROPHYLAXIS: enoxaparin Injectable 40 milliGRAM(s) SubCutaneous every 24 hours      LAB/STUDIES:  Labs:  CAPILLARY BLOOD GLUCOSE                       11.9   8.53  )-----------( 302      ( 2023 22:31 )             35.4         02-03    140  |  104  |  8<L>  ----------------------------<  89  5.1<H>   |  22  |  0.6    Lipase:25       Coags:     14.30  ----< 1.25    ( 2023 02:33 )     38.5        Urinalysis Basic - ( 2023 01:18 )    Color: Light Yellow / Appearance: Clear / S.020 / pH: x  Gluc: x / Ketone: Moderate  / Bili: Negative / Urobili: <2 mg/dL   Blood: x / Protein: Trace / Nitrite: Negative   Leuk Esterase: Negative / RBC: x / WBC x   Sq Epi: x / Non Sq Epi: x / Bacteria: x      IMAGING:  n/a    ACCESS/ DEVICES:  [x ] Peripheral IV

## 2023-02-05 NOTE — DISCHARGE NOTE NURSING/CASE MANAGEMENT/SOCIAL WORK - NSDCPEFALRISK_GEN_ALL_CORE
For information on Fall & Injury Prevention, visit: [email or web address] OR  [email or web address] OR  https://www.cdc.gov/steadi/patient.html

## 2023-02-05 NOTE — DISCHARGE NOTE NURSING/CASE MANAGEMENT/SOCIAL WORK - PATIENT PORTAL LINK FT
You can access the FollowMyHealth Patient Portal offered by [facility] by registering at the following website: [email or web address]. By joining [employer]’s FollowMyHealth portal, you will also be able to view your health information using other applications (apps) compatible with our system.

## 2023-02-05 NOTE — DISCHARGE NOTE PROVIDER - NSDCFUADDINST_GEN_ALL_CORE_FT
Activity: No heavy lifting > 10 lbs for 2 weeks. Avoid straining or excessive activity x 6 weeks.     Dressings: You have skin glue on your incisions which will come off on it's own in about 1 week. Do not scrub wounds. You may shower but do not bathe. May use ice packs for pain and swelling.     Pain control: You may take over-the-counter Tylenol and Motrin three times per day with food for up to 3 days.     Follow up: Please call the number provided to make an appointment with Dr. Cuevas in 1-2 weeks. Please call with any questions or concerns including fevers, worsening pain, pus from the wounds, or redness of the skin.

## 2023-02-05 NOTE — DISCHARGE NOTE PROVIDER - HOSPITAL COURSE
17 year old female with no significant past medical history who presented to the ED on 2/2/23 with complaints of right upper quadrant abdominal pain x 1day that radiated to the back and was associated with nausea but no emesis. Upon evaluation in the ED, RUQ ultrasound showed choledocholithiasis and patient was admitted to the surgical service for further management.    She was seen by [facility] on 2/3 and was planned for ERCP which she subsequently underwent with sphincterotomy and sludge removal. She then underwent laparoscopic cholecystectomy on 2/6/2023 which was uneventful. She was able to the tolerate diet post-operatively, remained hemodynamically stable, and was able to void and ambulate without issue.     Patient was seen by the surgical team on the morning on 2/5 and deemed clinically appropriate for discharge.

## 2023-02-05 NOTE — PROGRESS NOTE ADULT - ASSESSMENT
17yF w/ no significant PMHx/PSH presenting with 1 day history of acute RUQ abdominal pain that radiates to the back. Now s/p ERCP with sphincterotomy and sludge removal with advanced GI.S/P lap elmer     PLAN:  - Pain control  - Monitor vitals  - Continue regular diet  - Likely discharge to home today      [id number]
ASSESSMENT: 17yF w/ no significant PMHx/PSH presenting with 1 day history of acute RUQ abdominal pain that radiates to the back. Now s/p ERCP with sphincterotomy and sludge removal with advanced GI.    PLAN:  - Pain control  - Monitor vitals  - Added on to OR today for lap elmer, possible open  - Consent to be obtained  - F/u pathology   - Will follow up post-operatively    [id number]
Patient is a 17 year old female with no PMHx/PSH presenting with abdominal pain. She was seen recently on February 1st for the same pain but discharged from the ED after her initial surgical consult. She now returns again for abdominal pain located in the RUQ. Pain is now worse than initial attack. Pain is sharp at times, worsened with meals, with radiation to the back. PAtient with increas of T chantell to 2.8 along with increase in CBD by 2-3mm in less than 48 hours. Concern as has noted Cholelithiasis that a stone may have entered the common bile duct.      Cholelithiasis/ CBD dilation / T chantell 2.8  -s/p EGD: non erosive gastritis (biopsies), antral nodule mostly likely represent pancreatic rest (biopsies).  -s/p EUS focused examination of the common bile duct: there was a hyperechoic, non shadowing defect was seen in the common bile duct representing biliary sludge. CBD was dilated and measured 7 mm.  -s/p ERCP with biliary sphincterotomy and sludge removal: cholangiogram showed a filling defect with a dilated CBD. Multiple sweeps were made with removal of sludge.  -no signs of pancreatitis  -LFts improving     Plan:  - Keep NPO for now plan for OR for Laparoscopic cholecystectomy per surgery   -start PPI daily for GERD   - Monitor CBC and LFTs  - Await pathology  - follow up with GI as OP in 2 - 3 weeks    -call as needed, [id number] during weekdays till 5pm and call GI service after 5pm and on weekends [phone]  -Follow up with our [facility] located at [address] [place] [address]. Phone Number: [phone]

## 2023-02-05 NOTE — DISCHARGE NOTE PROVIDER - NSDCMRMEDTOKEN_GEN_ALL_CORE_FT
ondansetron 4 mg oral disintegrating strip: 1 each orally 3 times a day as needed for nausea.   Pepcid AC Maximum Strength 20 mg oral tablet: 1 tab(s) orally 2 times a day

## 2023-02-05 NOTE — DISCHARGE NOTE PROVIDER - NSDCCPCAREPLAN_GEN_ALL_CORE_FT
PRINCIPAL DISCHARGE DIAGNOSIS  Diagnosis: Cholelithiases  Assessment and Plan of Treatment:       SECONDARY DISCHARGE DIAGNOSES  Diagnosis: Abnormal transaminases  Assessment and Plan of Treatment:     Diagnosis: Abdominal pain  Assessment and Plan of Treatment:     Diagnosis: Common bile duct dilation  Assessment and Plan of Treatment:

## 2023-02-05 NOTE — DISCHARGE NOTE PROVIDER - CARE PROVIDER_API CALL
Enmanuel Cuevas)  Surgery  [address] [place] [address]  Phone: [phone]  Fax: [phone]  Follow Up Time:

## 2023-02-06 LAB — SURGICAL PATHOLOGY STUDY: SIGNIFICANT CHANGE UP

## 2023-02-07 LAB — SURGICAL PATHOLOGY STUDY: SIGNIFICANT CHANGE UP

## 2023-02-09 PROBLEM — Z00.00 ENCOUNTER FOR PREVENTIVE HEALTH EXAMINATION: Status: ACTIVE | Noted: 2023-02-09

## 2023-02-14 DIAGNOSIS — K83.8 OTHER SPECIFIED DISEASES OF BILIARY TRACT: ICD-10-CM

## 2023-02-14 DIAGNOSIS — Z20.822 CONTACT WITH AND (SUSPECTED) EXPOSURE TO COVID-19: ICD-10-CM

## 2023-02-14 DIAGNOSIS — R74.01 ELEVATION OF LEVELS OF LIVER TRANSAMINASE LEVELS: ICD-10-CM

## 2023-02-14 DIAGNOSIS — E66.9 OBESITY, UNSPECIFIED: ICD-10-CM

## 2023-02-14 DIAGNOSIS — K80.10 CALCULUS OF GALLBLADDER WITH CHRONIC CHOLECYSTITIS WITHOUT OBSTRUCTION: ICD-10-CM

## 2023-02-14 DIAGNOSIS — K21.9 GASTRO-ESOPHAGEAL REFLUX DISEASE WITHOUT ESOPHAGITIS: ICD-10-CM

## 2023-02-14 DIAGNOSIS — K29.70 GASTRITIS, UNSPECIFIED, WITHOUT BLEEDING: ICD-10-CM

## 2023-04-19 ENCOUNTER — EMERGENCY (EMERGENCY)
Facility: HOSPITAL | Age: 18
LOS: 0 days | Discharge: HOME | End: 2023-04-20
Attending: EMERGENCY MEDICINE
Payer: MEDICAID

## 2023-04-19 ENCOUNTER — EMERGENCY (EMERGENCY)
Facility: HOSPITAL | Age: 18
LOS: 0 days | Discharge: HOME | End: 2023-04-19
Attending: EMERGENCY MEDICINE
Payer: MEDICAID

## 2023-04-19 VITALS
TEMPERATURE: 99 F | SYSTOLIC BLOOD PRESSURE: 111 MMHG | DIASTOLIC BLOOD PRESSURE: 59 MMHG | RESPIRATION RATE: 20 BRPM | HEART RATE: 87 BPM | HEIGHT: 64 IN | OXYGEN SATURATION: 98 % | WEIGHT: 293 LBS

## 2023-04-19 VITALS
HEIGHT: 64 IN | RESPIRATION RATE: 20 BRPM | HEART RATE: 86 BPM | DIASTOLIC BLOOD PRESSURE: 56 MMHG | SYSTOLIC BLOOD PRESSURE: 104 MMHG | TEMPERATURE: 98 F | WEIGHT: 250 LBS | OXYGEN SATURATION: 98 %

## 2023-04-19 DIAGNOSIS — R19.7 DIARRHEA, UNSPECIFIED: ICD-10-CM

## 2023-04-19 DIAGNOSIS — R10.816 EPIGASTRIC ABDOMINAL TENDERNESS: ICD-10-CM

## 2023-04-19 DIAGNOSIS — R11.10 VOMITING, UNSPECIFIED: ICD-10-CM

## 2023-04-19 DIAGNOSIS — R10.13 EPIGASTRIC PAIN: ICD-10-CM

## 2023-04-19 DIAGNOSIS — R63.0 ANOREXIA: ICD-10-CM

## 2023-04-19 DIAGNOSIS — R07.89 OTHER CHEST PAIN: ICD-10-CM

## 2023-04-19 DIAGNOSIS — R10.811 RIGHT UPPER QUADRANT ABDOMINAL TENDERNESS: ICD-10-CM

## 2023-04-19 DIAGNOSIS — Z90.49 ACQUIRED ABSENCE OF OTHER SPECIFIED PARTS OF DIGESTIVE TRACT: ICD-10-CM

## 2023-04-19 DIAGNOSIS — R55 SYNCOPE AND COLLAPSE: ICD-10-CM

## 2023-04-19 DIAGNOSIS — R06.02 SHORTNESS OF BREATH: ICD-10-CM

## 2023-04-19 LAB
ALBUMIN SERPL ELPH-MCNC: 4.1 G/DL — SIGNIFICANT CHANGE UP (ref 3.5–5.2)
ALBUMIN SERPL ELPH-MCNC: 4.3 G/DL — SIGNIFICANT CHANGE UP (ref 3.5–5.2)
ALP SERPL-CCNC: 104 U/L — SIGNIFICANT CHANGE UP (ref 30–115)
ALP SERPL-CCNC: 109 U/L — SIGNIFICANT CHANGE UP (ref 30–115)
ALT FLD-CCNC: 10 U/L — LOW (ref 14–37)
ALT FLD-CCNC: 11 U/L — LOW (ref 14–37)
ANION GAP SERPL CALC-SCNC: 10 MMOL/L — SIGNIFICANT CHANGE UP (ref 7–14)
ANION GAP SERPL CALC-SCNC: 9 MMOL/L — SIGNIFICANT CHANGE UP (ref 7–14)
AST SERPL-CCNC: 14 U/L — SIGNIFICANT CHANGE UP (ref 14–37)
AST SERPL-CCNC: 14 U/L — SIGNIFICANT CHANGE UP (ref 14–37)
BASOPHILS # BLD AUTO: 0.02 K/UL — SIGNIFICANT CHANGE UP (ref 0–0.2)
BASOPHILS # BLD AUTO: 0.03 K/UL — SIGNIFICANT CHANGE UP (ref 0–0.2)
BASOPHILS NFR BLD AUTO: 0.2 % — SIGNIFICANT CHANGE UP (ref 0–1)
BASOPHILS NFR BLD AUTO: 0.3 % — SIGNIFICANT CHANGE UP (ref 0–1)
BILIRUB SERPL-MCNC: 0.3 MG/DL — SIGNIFICANT CHANGE UP (ref 0.2–1.2)
BILIRUB SERPL-MCNC: 0.4 MG/DL — SIGNIFICANT CHANGE UP (ref 0.2–1.2)
BUN SERPL-MCNC: 10 MG/DL — SIGNIFICANT CHANGE UP (ref 10–20)
BUN SERPL-MCNC: 9 MG/DL — LOW (ref 10–20)
CALCIUM SERPL-MCNC: 9.4 MG/DL — SIGNIFICANT CHANGE UP (ref 8.4–10.5)
CALCIUM SERPL-MCNC: 9.6 MG/DL — SIGNIFICANT CHANGE UP (ref 8.4–10.5)
CHLORIDE SERPL-SCNC: 103 MMOL/L — SIGNIFICANT CHANGE UP (ref 98–110)
CHLORIDE SERPL-SCNC: 106 MMOL/L — SIGNIFICANT CHANGE UP (ref 98–110)
CO2 SERPL-SCNC: 25 MMOL/L — SIGNIFICANT CHANGE UP (ref 17–32)
CO2 SERPL-SCNC: 25 MMOL/L — SIGNIFICANT CHANGE UP (ref 17–32)
CREAT SERPL-MCNC: 0.8 MG/DL — SIGNIFICANT CHANGE UP (ref 0.3–1)
CREAT SERPL-MCNC: 0.8 MG/DL — SIGNIFICANT CHANGE UP (ref 0.3–1)
D DIMER BLD IA.RAPID-MCNC: <150 NG/ML DDU — SIGNIFICANT CHANGE UP
EGFR: 109 ML/MIN/1.73M2 — SIGNIFICANT CHANGE UP
EGFR: 109 ML/MIN/1.73M2 — SIGNIFICANT CHANGE UP
EOSINOPHIL # BLD AUTO: 0.05 K/UL — SIGNIFICANT CHANGE UP (ref 0–0.7)
EOSINOPHIL # BLD AUTO: 0.05 K/UL — SIGNIFICANT CHANGE UP (ref 0–0.7)
EOSINOPHIL NFR BLD AUTO: 0.5 % — SIGNIFICANT CHANGE UP (ref 0–8)
EOSINOPHIL NFR BLD AUTO: 0.6 % — SIGNIFICANT CHANGE UP (ref 0–8)
GLUCOSE SERPL-MCNC: 86 MG/DL — SIGNIFICANT CHANGE UP (ref 70–99)
GLUCOSE SERPL-MCNC: 95 MG/DL — SIGNIFICANT CHANGE UP (ref 70–99)
HCG SERPL QL: NEGATIVE — SIGNIFICANT CHANGE UP
HCG SERPL-ACNC: <1 MIU/ML — SIGNIFICANT CHANGE UP
HCT VFR BLD CALC: 38.8 % — SIGNIFICANT CHANGE UP (ref 37–47)
HCT VFR BLD CALC: 39 % — SIGNIFICANT CHANGE UP (ref 37–47)
HGB BLD-MCNC: 12.6 G/DL — SIGNIFICANT CHANGE UP (ref 12–16)
HGB BLD-MCNC: 12.9 G/DL — SIGNIFICANT CHANGE UP (ref 12–16)
IMM GRANULOCYTES NFR BLD AUTO: 0.2 % — SIGNIFICANT CHANGE UP (ref 0.1–0.3)
IMM GRANULOCYTES NFR BLD AUTO: 0.4 % — HIGH (ref 0.1–0.3)
LIDOCAIN IGE QN: 15 U/L — SIGNIFICANT CHANGE UP (ref 7–60)
LYMPHOCYTES # BLD AUTO: 2.46 K/UL — SIGNIFICANT CHANGE UP (ref 1.2–3.4)
LYMPHOCYTES # BLD AUTO: 2.51 K/UL — SIGNIFICANT CHANGE UP (ref 1.2–3.4)
LYMPHOCYTES # BLD AUTO: 26.7 % — SIGNIFICANT CHANGE UP (ref 20.5–51.1)
LYMPHOCYTES # BLD AUTO: 30 % — SIGNIFICANT CHANGE UP (ref 20.5–51.1)
MCHC RBC-ENTMCNC: 25.6 PG — LOW (ref 27–31)
MCHC RBC-ENTMCNC: 26.5 PG — LOW (ref 27–31)
MCHC RBC-ENTMCNC: 32.3 G/DL — SIGNIFICANT CHANGE UP (ref 32–37)
MCHC RBC-ENTMCNC: 33.2 G/DL — SIGNIFICANT CHANGE UP (ref 32–37)
MCV RBC AUTO: 79.3 FL — LOW (ref 81–99)
MCV RBC AUTO: 79.7 FL — LOW (ref 81–99)
MONOCYTES # BLD AUTO: 0.52 K/UL — SIGNIFICANT CHANGE UP (ref 0.1–0.6)
MONOCYTES # BLD AUTO: 0.69 K/UL — HIGH (ref 0.1–0.6)
MONOCYTES NFR BLD AUTO: 6.2 % — SIGNIFICANT CHANGE UP (ref 1.7–9.3)
MONOCYTES NFR BLD AUTO: 7.5 % — SIGNIFICANT CHANGE UP (ref 1.7–9.3)
NEUTROPHILS # BLD AUTO: 5.24 K/UL — SIGNIFICANT CHANGE UP (ref 1.4–6.5)
NEUTROPHILS # BLD AUTO: 5.96 K/UL — SIGNIFICANT CHANGE UP (ref 1.4–6.5)
NEUTROPHILS NFR BLD AUTO: 62.6 % — SIGNIFICANT CHANGE UP (ref 42.2–75.2)
NEUTROPHILS NFR BLD AUTO: 64.8 % — SIGNIFICANT CHANGE UP (ref 42.2–75.2)
NRBC # BLD: 0 /100 WBCS — SIGNIFICANT CHANGE UP (ref 0–0)
NRBC # BLD: 0 /100 WBCS — SIGNIFICANT CHANGE UP (ref 0–0)
NT-PROBNP SERPL-SCNC: <5 PG/ML — SIGNIFICANT CHANGE UP (ref 0–300)
PLATELET # BLD AUTO: 289 K/UL — SIGNIFICANT CHANGE UP (ref 130–400)
PLATELET # BLD AUTO: 311 K/UL — SIGNIFICANT CHANGE UP (ref 130–400)
PMV BLD: 11 FL — HIGH (ref 7.4–10.4)
PMV BLD: 11.5 FL — HIGH (ref 7.4–10.4)
POTASSIUM SERPL-MCNC: 4.2 MMOL/L — SIGNIFICANT CHANGE UP (ref 3.5–5)
POTASSIUM SERPL-MCNC: 4.4 MMOL/L — SIGNIFICANT CHANGE UP (ref 3.5–5)
POTASSIUM SERPL-SCNC: 4.2 MMOL/L — SIGNIFICANT CHANGE UP (ref 3.5–5)
POTASSIUM SERPL-SCNC: 4.4 MMOL/L — SIGNIFICANT CHANGE UP (ref 3.5–5)
PROT SERPL-MCNC: 7.2 G/DL — SIGNIFICANT CHANGE UP (ref 6.1–8)
PROT SERPL-MCNC: 7.5 G/DL — SIGNIFICANT CHANGE UP (ref 6.1–8)
RBC # BLD: 4.87 M/UL — SIGNIFICANT CHANGE UP (ref 4.2–5.4)
RBC # BLD: 4.92 M/UL — SIGNIFICANT CHANGE UP (ref 4.2–5.4)
RBC # FLD: 13.6 % — SIGNIFICANT CHANGE UP (ref 11.5–14.5)
RBC # FLD: 13.6 % — SIGNIFICANT CHANGE UP (ref 11.5–14.5)
SODIUM SERPL-SCNC: 138 MMOL/L — SIGNIFICANT CHANGE UP (ref 135–146)
SODIUM SERPL-SCNC: 140 MMOL/L — SIGNIFICANT CHANGE UP (ref 135–146)
TROPONIN T SERPL-MCNC: <0.01 NG/ML — SIGNIFICANT CHANGE UP
TROPONIN T SERPL-MCNC: <0.01 NG/ML — SIGNIFICANT CHANGE UP
WBC # BLD: 8.37 K/UL — SIGNIFICANT CHANGE UP (ref 4.8–10.8)
WBC # BLD: 9.21 K/UL — SIGNIFICANT CHANGE UP (ref 4.8–10.8)
WBC # FLD AUTO: 8.37 K/UL — SIGNIFICANT CHANGE UP (ref 4.8–10.8)
WBC # FLD AUTO: 9.21 K/UL — SIGNIFICANT CHANGE UP (ref 4.8–10.8)

## 2023-04-19 PROCEDURE — 85025 COMPLETE CBC W/AUTO DIFF WBC: CPT

## 2023-04-19 PROCEDURE — 93005 ELECTROCARDIOGRAM TRACING: CPT

## 2023-04-19 PROCEDURE — 83880 ASSAY OF NATRIURETIC PEPTIDE: CPT

## 2023-04-19 PROCEDURE — 76705 ECHO EXAM OF ABDOMEN: CPT

## 2023-04-19 PROCEDURE — 84484 ASSAY OF TROPONIN QUANT: CPT

## 2023-04-19 PROCEDURE — 96375 TX/PRO/DX INJ NEW DRUG ADDON: CPT

## 2023-04-19 PROCEDURE — 80053 COMPREHEN METABOLIC PANEL: CPT

## 2023-04-19 PROCEDURE — 99285 EMERGENCY DEPT VISIT HI MDM: CPT | Mod: 25

## 2023-04-19 PROCEDURE — 96374 THER/PROPH/DIAG INJ IV PUSH: CPT

## 2023-04-19 PROCEDURE — 36415 COLL VENOUS BLD VENIPUNCTURE: CPT

## 2023-04-19 PROCEDURE — 83690 ASSAY OF LIPASE: CPT

## 2023-04-19 PROCEDURE — 99284 EMERGENCY DEPT VISIT MOD MDM: CPT

## 2023-04-19 PROCEDURE — 85379 FIBRIN DEGRADATION QUANT: CPT

## 2023-04-19 PROCEDURE — 71046 X-RAY EXAM CHEST 2 VIEWS: CPT

## 2023-04-19 PROCEDURE — 84702 CHORIONIC GONADOTROPIN TEST: CPT

## 2023-04-19 PROCEDURE — 99285 EMERGENCY DEPT VISIT HI MDM: CPT

## 2023-04-19 PROCEDURE — 93010 ELECTROCARDIOGRAM REPORT: CPT

## 2023-04-19 PROCEDURE — 84703 CHORIONIC GONADOTROPIN ASSAY: CPT

## 2023-04-19 PROCEDURE — 71046 X-RAY EXAM CHEST 2 VIEWS: CPT | Mod: 26

## 2023-04-19 PROCEDURE — 93010 ELECTROCARDIOGRAM REPORT: CPT | Mod: 77

## 2023-04-19 RX ORDER — IBUPROFEN 200 MG
600 TABLET ORAL ONCE
Refills: 0 | Status: COMPLETED | OUTPATIENT
Start: 2023-04-19 | End: 2023-04-19

## 2023-04-19 RX ORDER — ONDANSETRON 8 MG/1
4 TABLET, FILM COATED ORAL ONCE
Refills: 0 | Status: COMPLETED | OUTPATIENT
Start: 2023-04-19 | End: 2023-04-19

## 2023-04-19 RX ORDER — KETOROLAC TROMETHAMINE 30 MG/ML
30 SYRINGE (ML) INJECTION ONCE
Refills: 0 | Status: DISCONTINUED | OUTPATIENT
Start: 2023-04-19 | End: 2023-04-19

## 2023-04-19 RX ORDER — FAMOTIDINE 10 MG/ML
20 INJECTION INTRAVENOUS ONCE
Refills: 0 | Status: COMPLETED | OUTPATIENT
Start: 2023-04-19 | End: 2023-04-19

## 2023-04-19 RX ORDER — SODIUM CHLORIDE 9 MG/ML
1000 INJECTION, SOLUTION INTRAVENOUS ONCE
Refills: 0 | Status: COMPLETED | OUTPATIENT
Start: 2023-04-19 | End: 2023-04-19

## 2023-04-19 RX ORDER — DIPHENHYDRAMINE HYDROCHLORIDE AND LIDOCAINE HYDROCHLORIDE AND ALUMINUM HYDROXIDE AND MAGNESIUM HYDRO
30 KIT ONCE
Refills: 0 | Status: COMPLETED | OUTPATIENT
Start: 2023-04-19 | End: 2023-04-19

## 2023-04-19 RX ORDER — FAMOTIDINE 10 MG/ML
20 INJECTION INTRAVENOUS ONCE
Refills: 0 | Status: DISCONTINUED | OUTPATIENT
Start: 2023-04-19 | End: 2023-04-19

## 2023-04-19 RX ORDER — DIPHENHYDRAMINE HYDROCHLORIDE AND LIDOCAINE HYDROCHLORIDE AND ALUMINUM HYDROXIDE AND MAGNESIUM HYDRO
10 KIT ONCE
Refills: 0 | Status: DISCONTINUED | OUTPATIENT
Start: 2023-04-19 | End: 2023-04-19

## 2023-04-19 RX ADMIN — DIPHENHYDRAMINE HYDROCHLORIDE AND LIDOCAINE HYDROCHLORIDE AND ALUMINUM HYDROXIDE AND MAGNESIUM HYDRO 30 MILLILITER(S): KIT at 05:09

## 2023-04-19 RX ADMIN — Medication 600 MILLIGRAM(S): at 05:09

## 2023-04-19 RX ADMIN — FAMOTIDINE 20 MILLIGRAM(S): 10 INJECTION INTRAVENOUS at 05:09

## 2023-04-19 RX ADMIN — ONDANSETRON 4 MILLIGRAM(S): 8 TABLET, FILM COATED ORAL at 22:39

## 2023-04-19 RX ADMIN — SODIUM CHLORIDE 1000 MILLILITER(S): 9 INJECTION, SOLUTION INTRAVENOUS at 22:39

## 2023-04-19 RX ADMIN — FAMOTIDINE 20 MILLIGRAM(S): 10 INJECTION INTRAVENOUS at 22:34

## 2023-04-19 NOTE — ED PROVIDER NOTE - PROVIDER TOKENS
FREE:[LAST:[PCP],PHONE:[(   )    -],FAX:[(   )    -],ADDRESS:[your PCP],FOLLOWUP:[7-10 Days],ESTABLISHEDPATIENT:[T]],PROVIDER:[TOKEN:[[id number]],FOLLOWUP:[7-10 Days]]

## 2023-04-19 NOTE — ED PROVIDER NOTE - PHYSICAL EXAMINATION
GENERAL:  awake, alert, interactive, in pain but distractable  HEENT:  NC/AT, PERRLA, EOMI b/l, conjunctiva and sclera clear, non erythematous pharynx, no exudates, moist mucus membranes, TM's non bulging, non erythematous, no nasal congestion, supple neck, no cervical lymphadenopathy  CVS:  + S1, S2, RRR, no murmurs, cap refill <2 sec, 2+ peripheral pulses  RESP:  CTA B/L, no wheezes, no increased work of breathing, no tachypnea, no retractions, no nasal flaring  ABDO:  soft, tender epigastric and RUQ, non distended, no masses, +BS  MSK:  FROM in all extremities, no swelling or erythema, no deformities, some ttp of chest wall  NEURO:  alert and oriented, CN II-XII grossly intact - did not check visual acuity, reflexes 2+, normal gait, no sensory losses, 5/5 strength, normal tone  SKIN:  warm, dry, well-perfused, no rashes, no lesions

## 2023-04-19 NOTE — ED PROVIDER NOTE - OBJECTIVE STATEMENT
18y/o F pmhx cholecystectomy 2 mo ago p/w CP.  Patient reports she has been having pain in center of her chest for 3 days.  It radiates down and around he rib cage and upper abdomen.  She has trouble sleeping and breathing from the pain.  She did not try any pain medications to alleviate pain.  She endorses emesis x1.  Otherwise no change in voiding or stooling.  Patient was seen in ED at an outside hospital in [place] on Monday.  At that time, basic blood work was normal, GTT normal, CXR normal, Abd US normal, preg neg, EKG normal.  She was discharged with likely GI cause.  Pain has been worsening and patient couldn't sleep so she came to [facility] ED. 16y/o F pmhx cholecystectomy 2 mo ago p/w CP.  Patient reports she has been having pain in center of her chest for 3 days.  It radiates down and around he rib cage and upper abdomen.  She has trouble sleeping and breathing from the pain.  She did not try any pain medications to alleviate pain.  She endorses emesis x1.  Otherwise no change in voiding or stooling.  Patient was seen in ED at an outside hospital in [place] on Monday.  At that time, basic blood work was normal, GTT normal, CXR normal, Abd US normal, preg neg, EKG normal.  She was discharged with likely GI cause.  Pain has been worsening and patient couldn't sleep so she came to [facility] ED.    Denies other pmhx.  No personal or FHx of cardiac issues.

## 2023-04-19 NOTE — ED PROVIDER NOTE - CLINICAL SUMMARY MEDICAL DECISION MAKING FREE TEXT BOX
Authored by Dr. Dafne Lezama: s/o to me by Dr. Hall    Labs and EKG were ordered and reviewed, where indicated.  Imaging was ordered and reviewed by me, where indicated.  Appropriate medications for patient's presenting complaints were ordered and effects were reassessed, where indicated.  Patient's records (prior hospital, ED visit, and/or nursing home note) were reviewed, if available.  Additional history was obtained from EMS, family, and/or PCP (where available).  Escalation to admission/observation was considered.  However patient feels much better and patient/parent is comfortable with discharge.  Appropriate follow-up was arranged.    17-year-old female PMH obesity, lap cholecystectomy few months prior, presenting with shortness of breath x3 days, accomp by CP/tightness.  Recent outside ED evaluation for same at [facility] few days prior, had EKG chest x-ray and labs checked, presenting with persistent symptoms, EKG chest x-ray and labs including troponin and D-dimer ordered and patient signed out to me pending results which were normal–pain control was provided, patient reassessed and feeling better, AVSS– all results d/w parent(s) & copies given, strict return precautions discussed, rec outpt pcp/GI f/u

## 2023-04-19 NOTE — ED PROVIDER NOTE - PROGRESS NOTE DETAILS
LT:  EKG largely unchanged from prior recorded.  No troponin and d-dimer in documentation from outside hospital.  Will repeat CXR, collect CBC, CMP, d-dimer, troponin, serum preg, and give pepcid, pain med, BLM mouthwash. LT:  Patient comfortable.  Labs wnl.  CXR unremarkable.

## 2023-04-19 NOTE — ED PROVIDER NOTE - CARE PROVIDERS DIRECT ADDRESSES
,[email or web address]

## 2023-04-19 NOTE — ED ADULT TRIAGE NOTE - CHIEF COMPLAINT QUOTE
She's having abdominal pain that's radiating to her chest, she had her gallbladder removed last month, she was here for the same thing yesterday - EMS

## 2023-04-19 NOTE — ED ADULT TRIAGE NOTE - CHIEF COMPLAINT QUOTE
I've been having chest pain for the past three days. The first day I went to a hospital in [place], but I don't trust them. Tonight, I could not go to sleep, I felt like I was shaking and I thought I might pass out - patient  Patient reports blood work and CT scan workup in [facility]

## 2023-04-19 NOTE — ED PROVIDER NOTE - CARE PROVIDER_API CALL
PCP,   your PCP  Phone: (   )    -  Fax: (   )    -  Established Patient  Follow Up Time: 7-10 Days    Paul Singh)  Gastroenterology; Internal Medicine  [address] [place] [address]  Phone: [phone]  Fax: [phone]  Follow Up Time: 7-10 Days

## 2023-04-19 NOTE — ED PROVIDER NOTE - NSFOLLOWUPINSTRUCTIONS_ED_ALL_ED_FT
Shortness of Breath    Shortness of breath means you have trouble breathing. It could also mean that you have a medical problem. You should get immediate medical care for shortness of breath.     CAUSES  Not enough oxygen in the air such as with high altitudes or a smoke-filled room.  Certain lung diseases, infections, or problems.  Heart disease or conditions, such as angina or heart failure.   Low red blood cells (anemia).  Poor physical fitness, which can cause shortness of breath when you exercise.  Chest or back injuries or stiffness.  Being overweight.  Smoking.   Anxiety, which can make you feel like you are not getting enough air.    DIAGNOSIS  Serious medical problems can often be found during your physical exam. Tests may also be done to determine why you are having shortness of breath. Tests may include:    Chest X-rays.   Lung function tests.   Blood tests.  An electrocardiogram (ECG).  An ambulatory electrocardiogram. An ambulatory ECG records your heartbeat patterns over a 24-hour period.   Exercise testing.  A transthoracic echocardiogram (TTE). During echocardiography, sound waves are used to evaluate how blood flows through your heart.   A transesophageal echocardiogram (RONAN).   Imaging scans.     Your health care provider may not be able to find a cause for your shortness of breath after your exam. In this case, it is important to have a follow-up exam with your health care provider as directed.     TREATMENT  Treatment for shortness of breath depends on the cause of your symptoms and can vary greatly.    HOME CARE INSTRUCTIONS  Do not smoke. Smoking is a common cause of shortness of breath. If you smoke, ask for help to quit.  Avoid being around chemicals or things that may bother your breathing, such as paint fumes and dust.  Rest as needed. Slowly resume your usual activities.  If medicines were prescribed, take them as directed for the full length of time directed. This includes oxygen and any inhaled medicines.  Keep all follow-up appointments as directed by your health care provider.    SEEK MEDICAL CARE IF:  Your condition does not improve in the time expected.  You have a hard time doing your normal activities even with rest.  You have any new symptoms.    SEEK IMMEDIATE MEDICAL CARE IF:  Your shortness of breath gets worse.  You feel light-headed, faint, or develop a cough not controlled with medicines.  You start coughing up blood.   You have pain with breathing.  You have chest pain or pain in your arms, shoulders, or abdomen.  You have a fever.  You are unable to walk up stairs or exercise the way you normally do.     MAKE SURE YOU:  Understand these instructions.  Will watch your condition.  Will get help right away if you are not doing well or get worse.    ADDITIONAL NOTES AND INSTRUCTIONS    Please follow up with your Primary MD in 24-48 hr.  Seek immediate medical care for any new/worsening signs or symptoms.

## 2023-04-19 NOTE — ED PROVIDER NOTE - NS ED ROS FT
Constitutional: (-) fever (-)chills  (-)sweats  Eyes/ENT: (-) blurry vision (-) epistaxis  (-)rhinorrhea  (-)sore throat  Cardiovascular: (+) chest pain, (-) palpitations   Respiratory: (-) cough, (+) shortness of breath  Gastrointestinal: (+) vomiting, (-) diarrhea  (+) abdominal pain  Musculoskeletal: (-) neck pain, (-) back pain, (-) joint pain  Integumentary: (-) rash, (-) edema  Neurological: (-) headache, (-) altered mental status  (-) LOC  Psychiatric: (-) hallucinations  Allergic/Immunologic: (-) pruritus

## 2023-04-19 NOTE — ED PROVIDER NOTE - ATTENDING CONTRIBUTION TO CARE
I personally evaluated the patient. I reviewed the Resident’s or Physician Assistant’s note (as assigned above), and agree with the findings and plan except as documented in my note.  17-year-old girl here for evaluation of chest pain as per patient status postcholecystectomy x2 months ago but has now been experiencing intermittent difficulty breathing chest pain is unsure if it is GERD or something cardiac in nature was recently seen at a local hospital x2 days ago for the same complaints stated that she had blood work and a chest x-ray and was told it was all normal but she still is not feeling good physical exam reveals a young woman in pain but distractible with some difficulties breathing although pulse ox is within acceptable limits query anxiety component abdomen is soft nonsurgical we will do blood work image after meds and reassess

## 2023-04-19 NOTE — ED PROVIDER NOTE - PATIENT PORTAL LINK FT
You can access the FollowMyHealth Patient Portal offered by [facility] by registering at the following website: [email or web address]. By joining [facility]’s FollowMyHealth portal, you will also be able to view your health information using other applications (apps) compatible with our system.

## 2023-04-20 ENCOUNTER — EMERGENCY (EMERGENCY)
Facility: HOSPITAL | Age: 18
LOS: 0 days | Discharge: HOME | End: 2023-04-20
Attending: PEDIATRICS
Payer: MEDICAID

## 2023-04-20 VITALS
HEIGHT: 63.78 IN | RESPIRATION RATE: 18 BRPM | TEMPERATURE: 98 F | OXYGEN SATURATION: 99 % | DIASTOLIC BLOOD PRESSURE: 60 MMHG | HEART RATE: 65 BPM | SYSTOLIC BLOOD PRESSURE: 121 MMHG | WEIGHT: 245 LBS

## 2023-04-20 VITALS
TEMPERATURE: 98 F | DIASTOLIC BLOOD PRESSURE: 59 MMHG | OXYGEN SATURATION: 100 % | HEART RATE: 65 BPM | SYSTOLIC BLOOD PRESSURE: 124 MMHG | RESPIRATION RATE: 18 BRPM

## 2023-04-20 VITALS
TEMPERATURE: 208 F | WEIGHT: 245 LBS | RESPIRATION RATE: 18 BRPM | OXYGEN SATURATION: 100 % | SYSTOLIC BLOOD PRESSURE: 136 MMHG | DIASTOLIC BLOOD PRESSURE: 68 MMHG | HEART RATE: 68 BPM

## 2023-04-20 DIAGNOSIS — R07.89 OTHER CHEST PAIN: ICD-10-CM

## 2023-04-20 DIAGNOSIS — Z90.49 ACQUIRED ABSENCE OF OTHER SPECIFIED PARTS OF DIGESTIVE TRACT: Chronic | ICD-10-CM

## 2023-04-20 DIAGNOSIS — Z90.49 ACQUIRED ABSENCE OF OTHER SPECIFIED PARTS OF DIGESTIVE TRACT: ICD-10-CM

## 2023-04-20 DIAGNOSIS — R10.13 EPIGASTRIC PAIN: ICD-10-CM

## 2023-04-20 DIAGNOSIS — R11.0 NAUSEA: ICD-10-CM

## 2023-04-20 DIAGNOSIS — K21.9 GASTRO-ESOPHAGEAL REFLUX DISEASE WITHOUT ESOPHAGITIS: ICD-10-CM

## 2023-04-20 DIAGNOSIS — R10.84 GENERALIZED ABDOMINAL PAIN: ICD-10-CM

## 2023-04-20 PROCEDURE — L9995: CPT

## 2023-04-20 PROCEDURE — 99283 EMERGENCY DEPT VISIT LOW MDM: CPT | Mod: 25

## 2023-04-20 PROCEDURE — 96372 THER/PROPH/DIAG INJ SC/IM: CPT

## 2023-04-20 PROCEDURE — 93005 ELECTROCARDIOGRAM TRACING: CPT

## 2023-04-20 PROCEDURE — 76705 ECHO EXAM OF ABDOMEN: CPT | Mod: 26

## 2023-04-20 PROCEDURE — 82962 GLUCOSE BLOOD TEST: CPT

## 2023-04-20 PROCEDURE — 99284 EMERGENCY DEPT VISIT MOD MDM: CPT

## 2023-04-20 PROCEDURE — 93010 ELECTROCARDIOGRAM REPORT: CPT | Mod: 77

## 2023-04-20 PROCEDURE — 93010 ELECTROCARDIOGRAM REPORT: CPT

## 2023-04-20 RX ORDER — SUCRALFATE 1 G
1 TABLET ORAL ONCE
Refills: 0 | Status: DISCONTINUED | OUTPATIENT
Start: 2023-04-20 | End: 2023-04-20

## 2023-04-20 RX ORDER — SUCRALFATE 1 G
10 TABLET ORAL
Qty: 210 | Refills: 0
Start: 2023-04-20 | End: 2023-04-26

## 2023-04-20 RX ORDER — ONDANSETRON 8 MG/1
1 TABLET, FILM COATED ORAL
Qty: 9 | Refills: 0
Start: 2023-04-20 | End: 2023-04-22

## 2023-04-20 RX ORDER — DIPHENHYDRAMINE HYDROCHLORIDE AND LIDOCAINE HYDROCHLORIDE AND ALUMINUM HYDROXIDE AND MAGNESIUM HYDRO
30 KIT ONCE
Refills: 0 | Status: COMPLETED | OUTPATIENT
Start: 2023-04-20 | End: 2023-04-20

## 2023-04-20 RX ORDER — FAMOTIDINE 10 MG/ML
20 INJECTION INTRAVENOUS DAILY
Refills: 0 | Status: DISCONTINUED | OUTPATIENT
Start: 2023-04-20 | End: 2023-04-20

## 2023-04-20 RX ORDER — ONDANSETRON 8 MG/1
4 TABLET, FILM COATED ORAL ONCE
Refills: 0 | Status: COMPLETED | OUTPATIENT
Start: 2023-04-20 | End: 2023-04-20

## 2023-04-20 RX ORDER — FAMOTIDINE 10 MG/ML
1 INJECTION INTRAVENOUS
Qty: 28 | Refills: 0
Start: 2023-04-20 | End: 2023-05-03

## 2023-04-20 RX ORDER — SUCRALFATE 1 G
1 TABLET ORAL ONCE
Refills: 0 | Status: COMPLETED | OUTPATIENT
Start: 2023-04-20 | End: 2023-04-20

## 2023-04-20 RX ORDER — KETOROLAC TROMETHAMINE 30 MG/ML
15 SYRINGE (ML) INJECTION ONCE
Refills: 0 | Status: DISCONTINUED | OUTPATIENT
Start: 2023-04-20 | End: 2023-04-20

## 2023-04-20 RX ORDER — IBUPROFEN 200 MG
600 TABLET ORAL ONCE
Refills: 0 | Status: COMPLETED | OUTPATIENT
Start: 2023-04-20 | End: 2023-04-20

## 2023-04-20 RX ADMIN — Medication 600 MILLIGRAM(S): at 13:19

## 2023-04-20 RX ADMIN — FAMOTIDINE 20 MILLIGRAM(S): 10 INJECTION INTRAVENOUS at 13:19

## 2023-04-20 RX ADMIN — DIPHENHYDRAMINE HYDROCHLORIDE AND LIDOCAINE HYDROCHLORIDE AND ALUMINUM HYDROXIDE AND MAGNESIUM HYDRO 30 MILLILITER(S): KIT at 18:55

## 2023-04-20 RX ADMIN — Medication 1 GRAM(S): at 20:00

## 2023-04-20 RX ADMIN — ONDANSETRON 4 MILLIGRAM(S): 8 TABLET, FILM COATED ORAL at 13:19

## 2023-04-20 RX ADMIN — Medication 15 MILLIGRAM(S): at 18:56

## 2023-04-20 NOTE — ED PROVIDER NOTE - EKG ADDITIONAL INFORMATION FREE TEXT
Sinus rhythm at 65 BPM with prolonged AK interval 232ms c/w 1st degree AV block, no ST/T wave changes

## 2023-04-20 NOTE — ED PROVIDER NOTE - CLINICAL SUMMARY MEDICAL DECISION MAKING FREE TEXT BOX
pt with chest pain seen in the ed 4 x over last 36 hours. extensive work up done. spoke to pts pharmacy and confirmed pt does not have to pay money for it. Meds are ready. Let pt know. Follow up with GI as scheduled.

## 2023-04-20 NOTE — ED PROVIDER NOTE - ATTENDING CONTRIBUTION TO CARE
17-year-old female presents to the ER with burning chest pain.  Patient was just seen in the ED overnight for similar complaints.  Had extensive work-up including cardiac and abdominal work-up.  Pain resolved with GI cocktail the patient was discharged.  Patient reports pain came back so came back to the ED.  Did not  the medications at the pharmacy.  No fevers or chills.  Recent cholecystectomy 2 months ago.  Vital signs reviewed general well-appearing no acute distress HEENT PERRLA EOMI TMs clear pharynx clear moist mucous membranes CVS S1-S2 no murmurs lungs clear to auscultation bilaterally abdomen soft nontender nondistended extremities full range of motion x4 skin no rashes warm well perfused.  Assessment: Epigastric abdominal pain.  Plan: GI meds, EKG.  Likely discharge

## 2023-04-20 NOTE — ED PROVIDER NOTE - OBJECTIVE STATEMENT
17-year-old female with PMH of cholecystectomy, no other PMH, seen in the ED last night less than 12 hours ago for evaluation of chest pressure, presents to ED for same complaint.  Here patient reports chest pressure intermittent over the last 5 days, significantly worse yesterday so came to ED.  Here she had EKG which showed prolonged MA otherwise WNL, CXR WNL, labs including troponin and LFTs, WNL, RUQ sono WNL, felt better after getting Motrin/Pepcid/Zofran/Magic mouthwash and was discharged home with rapid referral for gastroenterology.  States since then she has been able to make an appointment and has a GI appointment in 7 days, but did not receive the same medications that were sent to her pharmacy.  States pain was worse today, instead of going to the pharmacy on her medication just came here instead.  She denies new or worsening symptoms.  Describes pain as central chest pressure that lasts a few minutes and resolves, intermittent, much worse after eating but also worse after taking a deep breath.  + Nausea.  Denies fever/chills, SOB, vomiting or diarrhea, black or bloody stool, urinary symptoms, vaginal bleeding or discharge, rash.  LMP 2 weeks ago.

## 2023-04-20 NOTE — ED PROVIDER NOTE - ATTENDING CONTRIBUTION TO CARE
17 year-old female s/p lap cholecystectomy in February presenting with abdominal pain rating to chest x3 days.  Seen at [facility] ED 2 days prior for same as well as [facility] ED yesterday for same, work-up including EKG chest x-ray labs/D-dimer all normal, patient DC to home with recs for outpatient GI follow-up.  Now returns with same symptoms.  Epigastric burning radiating to chest no specific aggravating alleviating factors no associated symptoms.  No F/C, URI symptoms, cough, SOB–BENEDICT, NVD, flank pain, urinary symptoms, rash.    PE:  nad  skin warm, dry  ncat  neck supple  rrr nl s1s2 no mrg  ctab no wrr  abd soft ntnd no palpable masses no rgr  back non-tender no cvat  ext no cce dpi  neuro aaox3 grossly nf exam

## 2023-04-20 NOTE — ED PROVIDER NOTE - CLINICAL SUMMARY MEDICAL DECISION MAKING FREE TEXT BOX
Labs and EKG were ordered and reviewed, where indicated.  Imaging was ordered and reviewed by me, where indicated.  Appropriate medications for patient's presenting complaints were ordered and effects were reassessed, where indicated.  Patient's records (prior hospital, ED visit, and/or nursing home note) were reviewed, if available.  Additional history was obtained from EMS, family, and/or PCP (where available).  Escalation to admission/observation was considered.  However patient feels much better and patient/parent is comfortable with discharge.  Appropriate follow-up was arranged.    Recurrent epigastric pain radiating to chest, recent work-up including EKG chest x-ray labs D-dimer all normal–AVSS, ED work-up today including repeat labs with troponin and BNP right upper quadrant ultrasound no acute findings–patient reassessed tolerating p.o. - all results d/w pt & copies given, strict return precautions discussed, rec outpt GI f/u

## 2023-04-20 NOTE — ED PROVIDER NOTE - ATTENDING CONTRIBUTION TO CARE
17-year-old female recently seen in the emergency department about 6 hours ago for similar complaints comes in for chest pain.  Patient denies any new symptoms.  Patient states she coming back in because the pain gets worse and she gets anxious about it.  Spoke with mom via FaceTime this time regarding extensive work-up that was done over the last 2 days all with normal labs and work-up.  Vital signs reviewed general well-appearing no acute distress obese female HEENT PERRLA EOMI TMs clear pharynx clear moist mucous membranes CVS S1-S2 no murmurs lungs clear to auscultation bilaterally abdomen soft nontender nondistended extremities full range of motion x4 skin no rashes warm well perfused.  Assessment: Chest pain.  Plan: EKG, reassurance.  Discharge again discussed with patient and mother this time continue outpatient meds follow-up with GI in 1 week as scheduled

## 2023-04-20 NOTE — ED PROVIDER NOTE - PROGRESS NOTE DETAILS
CHRISTI: Discussed in length with patient and mother via phone need to obtain medication from pharmacy.  Will follow-up with GI as she has an appointment next week, given number again for referral coordinator to try to make a sooner appointment.  Supportive care and return precautions advised.  Patient comfortable plan

## 2023-04-20 NOTE — ED PROVIDER NOTE - PROGRESS NOTE DETAILS
CHRISTI: pt feels better after medication. Discussed in length results from earlier today and no need for repeat testing without new or worsened SX. States she will  her medication from pharmacy that was rx'd earlier at prior visit and will f/u with GI appointment she has already made with referral team. Return precautions and supportive care provided. Pt comfortable with plan.     Patient to be discharged from ED. Any available test results were discussed with patient and/or family. Verbal instructions given, including instructions to return to ED immediately for any new, worsening, or concerning symptoms. Patient endorsed understanding. Written discharge instructions additionally given, including follow-up plan.

## 2023-04-20 NOTE — ED PROVIDER NOTE - OBJECTIVE STATEMENT
17-year-old female with PMH of cholecystectomy, no other PMH, seen in the ED by me earlier this shift as well as twice yesterday for evaluation of chest pressure, presents to ED for same complaint.  Here patient reports chest pressure intermittent over the last 5 days, significantly worse yesterday so came to ED.  Here she had EKG which showed prolonged IL otherwise WNL, CXR WNL, labs including troponin and LFTs, WNL, RUQ sono WNL, felt better after getting Motrin/Pepcid/Zofran/Magic mouthwash and was discharged home with rapid referral for gastroenterology.  Pt seen by me after this for continued SX, had repeat EKG similar to prior and felt better after meds but states she again did not  her medication from the pharmacy, came back to ED instead for continued pain.  She denies new or worsening symptoms.  Describes pain as central chest pressure that lasts a few minutes and resolves, intermittent, much worse after eating but also worse after taking a deep breath.  + Nausea.  Denies fever/chills, SOB, vomiting or diarrhea, black or bloody stool, urinary symptoms, vaginal bleeding or discharge, rash.  LMP 2 weeks ago. Has GI follow up in 7 days made with ED referral program yesterday.

## 2023-04-20 NOTE — ED PROVIDER NOTE - PROGRESS NOTE DETAILS
BK: Patient reassessed and resting comfortably. Tolerating PO without any issues. Abdomen nontender and VSS. Results discussed. Patient understands the need for outpatient follow up with GI. Patient offered urine testing, but refused.

## 2023-04-20 NOTE — ED PROVIDER NOTE - CLINICAL SUMMARY MEDICAL DECISION MAKING FREE TEXT BOX
Patient with epigastric abdominal pain.  Pain improved with p.o. meds.  EKG at baseline.  Will discharge.  Patient has GI follow-up scheduled for next week.

## 2023-04-20 NOTE — ED PEDIATRIC TRIAGE NOTE - CHIEF COMPLAINT QUOTE
Patient BIBA from [facility] "I'm having GERD and I was here and they gave me morphine and it helped so I want to see if I could have that." Patient has NOT picked up meds from pharmacy.

## 2023-04-20 NOTE — ED PROVIDER NOTE - NSFOLLOWUPINSTRUCTIONS_ED_ALL_ED_FT
Our Emergency Department Referral Coordinators will be reaching out to you in the next 24-48 hours from 9:00am to 5:00pm with a follow up appointment with gastroenterology. Please expect a phone call from the hospital in that time frame. If you do not receive a call or if you have any questions or concerns, you can reach them at   [phone]

## 2023-04-20 NOTE — ED PEDIATRIC NURSE NOTE - CAS TRG GEN SKIN COLOR
Patient swabbed for COVID-19 using [employer] vendor but was not evaluated inside the clinic. Patient specimen collected in drive through in patients private vehicle due to order present from primary care provider. Patient was not evaluated by flu clinic provider.
Normal for race

## 2023-04-20 NOTE — ED PROVIDER NOTE - NSFOLLOWUPINSTRUCTIONS_ED_ALL_ED_FT
FOLLOW UP WITH GASTROENTEROLOGY NEXT WEEK AS SCHEDULED WITH REFERRAL COORDINATOR. IF YOU HAVE ANY QUESTIONS YOU you can reach them at  [phone]       Nonspecific Chest Pain  Chest pain can be caused by many different conditions. Some causes of chest pain can be life-threatening. These will require treatment right away. Serious causes of chest pain include:  Heart attack.  A tear in the body's main blood vessel.  Redness and swelling (inflammation) around your heart.  Blood clot in your lungs.  Other causes of chest pain may not be so serious. These include:  Heartburn.  Anxiety or stress.  Damage to bones or muscles in your chest.  Lung infections.  Chest pain can feel like:  Pain or discomfort in your chest.  Crushing, pressure, aching, or squeezing pain.  Burning or tingling.  Dull or sharp pain that is worse when you move, cough, or take a deep breath.  Pain or discomfort that is also felt in your back, neck, jaw, shoulder, or arm, or pain that spreads to any of these areas.  It is hard to know whether your pain is caused by something that is serious or something that is not so serious. So it is important to see your doctor right away if you have chest pain.    Follow these instructions at home:  Medicines    Take over-the-counter and prescription medicines only as told by your doctor.  If you were prescribed an antibiotic medicine, take it as told by your doctor. Do not stop taking the antibiotic even if you start to feel better.  Lifestyle    A plate along with examples of foods in a healthy diet.  Rest as told by your doctor.  Do not use any products that contain nicotine or tobacco, such as cigarettes, e-cigarettes, and chewing tobacco. If you need help quitting, ask your doctor.  Do not drink alcohol.  Make lifestyle changes as told by your doctor. These may include:  Getting regular exercise. Ask your doctor what activities are safe for you.  Eating a heart-healthy diet. A diet and nutrition specialist (dietitian) can help you to learn healthy eating options.  Staying at a healthy weight.  Treating diabetes or high blood pressure, if needed.  Lowering your stress. Activities such as yoga and relaxation techniques can help.  General instructions    Pay attention to any changes in your symptoms. Tell your doctor about them or any new symptoms.  Avoid any activities that cause chest pain.  Keep all follow-up visits as told by your doctor. This is important. You may need more testing if your chest pain does not go away.  Contact a doctor if:  Your chest pain does not go away.  You feel depressed.  You have a fever.  Get help right away if:  Your chest pain is worse.  You have a cough that gets worse, or you cough up blood.  You have very bad (severe) pain in your belly (abdomen).  You pass out (faint).  You have either of these for no clear reason:  Sudden chest discomfort.  Sudden discomfort in your arms, back, neck, or jaw.  You have shortness of breath at any time.  You suddenly start to sweat, or your skin gets clammy.  You feel sick to your stomach (nauseous).  You throw up (vomit).  You suddenly feel lightheaded or dizzy.  You feel very weak or tired.  Your heart starts to beat fast, or it feels like it is skipping beats.  These symptoms may be an emergency. Do not wait to see if the symptoms will go away. Get medical help right away. Call your local emergency services (911 in the [place] Do not drive yourself to the hospital.    Summary  Chest pain can be caused by many different conditions. The cause may be serious and need treatment right away. If you have chest pain, see your doctor right away.  Follow your doctor's instructions for taking medicines and making lifestyle changes.  Keep all follow-up visits as told by your doctor. This includes visits for any further testing if your chest pain does not go away.  Be sure to know the signs that show that your condition has become worse. Get help right away if you have these symptoms.  This information is not intended to replace advice given to you by your health care provider. Make sure you discuss any questions you have with your health care provider.

## 2023-04-20 NOTE — ED ADULT NURSE NOTE - OBJECTIVE STATEMENT
Pt. c/o abdominal pain radiating to her chest. As per EMS, pt. was seen her yesterday for the same issue.

## 2023-04-20 NOTE — ED PROVIDER NOTE - PHYSICAL EXAMINATION
VITAL SIGNS: I have reviewed nursing notes and confirm.  CONSTITUTIONAL: Well-developed; well-nourished; in no acute distress.  SKIN: Skin exam is warm and dry, no acute rash.  EYES: PERRL, conjunctiva and sclera clear.  ENT: MMM, OP clear  CARD: S1, S2 normal; no murmurs, gallops, or rubs. Regular rate and rhythm.  RESP: (+) reproducible anterior chest wall tenderness without overlying skin changes. Normal respiratory effort, no tachypnea or distress. Lungs CTAB, no wheezes, rales or rhonchi.  ABD: obese, soft, ND, (+)mild generalized upper abdominal TTP  NEURO: Alert, oriented. Grossly unremarkable. No focal deficits.  PSYCH: Cooperative, appropriate.

## 2023-04-20 NOTE — ED PEDIATRIC NURSE NOTE - OBJECTIVE STATEMENT
17 year old female presents to the ED stating "I was here earlier, I am having chest pain again and I feel like I cant breathe, I was given medicine that made me feel better. They sent medicine to the pharmacy but I couldn't not get it because by the time I got out of the car, the pain came back." 17 year old female presents to the ED stating "I was here earlier, I am having chest pain again and I feel like I cant breathe, I was given medicine that made me feel better. They sent medicine to the pharmacy but I couldn't get it because by the time I got out of the car, the pain came back."

## 2023-04-20 NOTE — ED PROVIDER NOTE - PATIENT PORTAL LINK FT
You can access the FollowMyHealth Patient Portal offered by [facility] by registering at the following website: [email or web address]. By joining [employer]’s FollowMyHealth portal, you will also be able to view your health information using other applications (apps) compatible with our system.

## 2023-04-20 NOTE — ED PROVIDER NOTE - OBJECTIVE STATEMENT
17-year-old female with no past medical history presenting for 4 days of sharp nonradiating epigastric abdominal pain.  Patient notes 4 episodes of nonbilious nonbloody vomiting at onset of symptoms but has not had any vomiting in the past few days.  Patient does note decreased p.o. intake in the last few days.  Also had 1 episode of nonbloody diarrhea yesterday.  Patient endorses that this morning after being at school walking around and only having breakfast she had a syncopal episode around 2:30 PM in which she did not have any head trauma.  Patient endorses never syncopized before.  Patient denies any headache vision changes dizziness numbness weakness tingling fever cough congestion shortness of breath chest pain dysuria hematuria melena hematochezia drug or alcohol use recent travel or sick contacts.  LMP 3 weeks ago.

## 2023-04-20 NOTE — ED PROVIDER NOTE - PATIENT PORTAL LINK FT
You can access the FollowMyHealth Patient Portal offered by [facility] by registering at the following website: [email or web address]. By joining [employer]’s FollowMyHealth portal, you will also be able to view your health information using other applications (apps) compatible with our system.

## 2023-04-20 NOTE — ED PEDIATRIC NURSE NOTE - CHIEF COMPLAINT QUOTE
Patient BIBA from [place] "I'm having GERD and I was here and they gave me morphine and it helped so I want to see if I could have that." Patient has NOT picked up meds from pharmacy.

## 2023-04-20 NOTE — ED PROVIDER NOTE - PATIENT PORTAL LINK FT
You can access the FollowMyHealth Patient Portal offered by [facility] by registering at the following website: [email or web address]. By joining [employer]’s FollowMyHealth portal, you will also be able to view your health information using other applications (apps) compatible with our system.

## 2023-04-20 NOTE — ED PEDIATRIC TRIAGE NOTE - CHIEF COMPLAINT QUOTE
Patient BIBA from home with complaints of chest discomfort, abdominal pain, and nausea. Patient seen in ED yesterday and discharged with GI follow up and prescribed meds but has not picked them up.

## 2023-04-20 NOTE — ED PEDIATRIC NURSE NOTE - OBJECTIVE STATEMENT
Patient a+ox3 co abdominal pain, chest pressure and nausea - pt seen in ed yesterday same complaint and has not picked up prescribed meds

## 2023-04-20 NOTE — ED PROVIDER NOTE - NSFOLLOWUPINSTRUCTIONS_ED_ALL_ED_FT
FOLLOW UP WITH THE GASTROENTEROLOGIST APPOINTMENT YOU HAVE IN 1 WEEK THAT WAS MADE WITH ED REFERRAL TEAM. If you have any questions or concerns, you can reach the referral team at   [phone]     Gastroesophageal Reflux Disease, Adult    Gastroesophageal reflux (LAITH) happens when acid from the stomach flows up into the tube that connects the mouth and the stomach (esophagus). Normally, food travels down the esophagus and stays in the stomach to be digested. However, when a person has LAITH, food and stomach acid sometimes move back up into the esophagus. If this becomes a more serious problem, the person may be diagnosed with a disease called gastroesophageal reflux disease (GERD). GERD occurs when the reflux:  Happens often.  Causes frequent or severe symptoms.  Causes problems such as damage to the esophagus.  When stomach acid comes in contact with the esophagus, the acid may cause inflammation in the esophagus. Over time, GERD may create small holes (ulcers) in the lining of the esophagus.    What are the causes?  This condition is caused by a problem with the muscle between the esophagus and the stomach (lower esophageal sphincter, or LES). Normally, the LES muscle closes after food passes through the esophagus to the stomach. When the LES is weakened or abnormal, it does not close properly, and that allows food and stomach acid to go back up into the esophagus.    The LES can be weakened by certain dietary substances, medicines, and medical conditions, including:  Tobacco use.  Pregnancy.  Having a hiatal hernia.  Alcohol use.  Certain foods and beverages, such as coffee, chocolate, onions, and peppermint.  What increases the risk?  You are more likely to develop this condition if you:  Have an increased body weight.  Have a connective tissue disorder.  Take NSAIDs, such as ibuprofen.  What are the signs or symptoms?  Symptoms of this condition include:  Heartburn.  Difficult or painful swallowing and the feeling of having a lump in the throat.  A bitter taste in the mouth.  Bad breath and having a large amount of saliva.  Having an upset or bloated stomach and belching.  Chest pain. Different conditions can cause chest pain. Make sure you see your health care provider if you experience chest pain.  Shortness of breath or wheezing.  Ongoing (chronic) cough or a nighttime cough.  Wearing away of tooth enamel.  Weight loss.  How is this diagnosed?  This condition may be diagnosed based on a medical history and a physical exam. To determine if you have mild or severe GERD, your health care provider may also monitor how you respond to treatment. You may also have tests, including:  A test to examine your stomach and esophagus with a small camera (endoscopy).  A test that measures the acidity level in your esophagus.  A test that measures how much pressure is on your esophagus.  A barium swallow or modified barium swallow test to show the shape, size, and functioning of your esophagus.  How is this treated?  Treatment for this condition may vary depending on how severe your symptoms are. Your health care provider may recommend:  Changes to your diet.  Medicine.  Surgery.  The goal of treatment is to help relieve your symptoms and to prevent complications.    Follow these instructions at home:  Eating and drinking      Follow a diet as recommended by your health care provider. This may involve avoiding foods and drinks such as:  Coffee and tea, with or without caffeine.  Drinks that contain alcohol.  Energy drinks and sports drinks.  Carbonated drinks or sodas.  Chocolate and cocoa.  Peppermint and mint flavorings.  Garlic and onions.  Horseradish.  Spicy and acidic foods, including peppers, chili powder, lion powder, vinegar, hot sauces, and barbecue sauce.  Citrus fruit juices and citrus fruits, such as oranges, chai, and limes.  Tomato-based foods, such as red sauce, chili, salsa, and pizza with red sauce.  Fried and fatty foods, such as donuts, french fries, potato chips, and high-fat dressings.  High-fat meats, such as hot dogs and fatty cuts of red and white meats, such as rib eye steak, sausage, ham, and majano.  High-fat dairy items, such as whole milk, butter, and cream cheese.  Eat small, frequent meals instead of large meals.  Avoid drinking large amounts of liquid with your meals.  Avoid eating meals during the 2–3 hours before bedtime.  Avoid lying down right after you eat.  Do not exercise right after you eat.  Lifestyle      Do not use any products that contain nicotine or tobacco. These products include cigarettes, chewing tobacco, and vaping devices, such as e-cigarettes. If you need help quitting, ask your health care provider.  Try to reduce your stress by using methods such as yoga or meditation. If you need help reducing stress, ask your health care provider.  If you are overweight, reduce your weight to an amount that is healthy for you. Ask your health care provider for guidance about a safe weight loss goal.  General instructions    Pay attention to any changes in your symptoms.  Take over-the-counter and prescription medicines only as told by your health care provider. Do not take aspirin, ibuprofen, or other NSAIDs unless your health care provider told you to take these medicines.  Wear loose-fitting clothing. Do not wear anything tight around your waist that causes pressure on your abdomen.  Raise (elevate) the head of your bed about 6 inches (15 cm). You can use a wedge to do this.  Avoid bending over if this makes your symptoms worse.  Keep all follow-up visits. This is important.  Contact a health care provider if:  You have:  New symptoms.  Unexplained weight loss.  Difficulty swallowing or it hurts to swallow.  Wheezing or a persistent cough.  A hoarse voice.  Your symptoms do not improve with treatment.  Get help right away if:  You have sudden pain in your arms, neck, jaw, teeth, or back.  You suddenly feel sweaty, dizzy, or light-headed.  You have chest pain or shortness of breath.  You vomit and the vomit is green, yellow, or black, or it looks like blood or coffee grounds.  You faint.  You have stool that is red, bloody, or black.  You cannot swallow, drink, or eat.  These symptoms may represent a serious problem that is an emergency. Do not wait to see if the symptoms will go away. Get medical help right away. Call your local emergency services (911 in the [place] Do not drive yourself to the hospital.    Summary  Gastroesophageal reflux happens when acid from the stomach flows up into the esophagus. GERD is a disease in which the reflux happens often, causes frequent or severe symptoms, or causes problems such as damage to the esophagus.  Treatment for this condition may vary depending on how severe your symptoms are. Your health care provider may recommend diet and lifestyle changes, medicine, or surgery.  Contact a health care provider if you have new or worsening symptoms.  Take over-the-counter and prescription medicines only as told by your health care provider. Do not take aspirin, ibuprofen, or other NSAIDs unless your health care provider told you to do so.  Keep all follow-up visits as told by your health care provider. This is important.  This information is not intended to replace advice given to you by your health care provider. Make sure you discuss any questions you have with your health care provider.

## 2023-04-22 ENCOUNTER — EMERGENCY (EMERGENCY)
Facility: HOSPITAL | Age: 18
LOS: 0 days | Discharge: HOME | End: 2023-04-22
Attending: EMERGENCY MEDICINE
Payer: MEDICAID

## 2023-04-22 VITALS
DIASTOLIC BLOOD PRESSURE: 58 MMHG | SYSTOLIC BLOOD PRESSURE: 121 MMHG | TEMPERATURE: 97 F | OXYGEN SATURATION: 100 % | HEART RATE: 63 BPM | RESPIRATION RATE: 19 BRPM

## 2023-04-22 VITALS
HEART RATE: 55 BPM | OXYGEN SATURATION: 100 % | RESPIRATION RATE: 18 BRPM | DIASTOLIC BLOOD PRESSURE: 63 MMHG | TEMPERATURE: 96 F | SYSTOLIC BLOOD PRESSURE: 113 MMHG

## 2023-04-22 VITALS
TEMPERATURE: 99 F | RESPIRATION RATE: 16 BRPM | SYSTOLIC BLOOD PRESSURE: 111 MMHG | DIASTOLIC BLOOD PRESSURE: 65 MMHG | HEART RATE: 83 BPM | OXYGEN SATURATION: 99 % | WEIGHT: 293 LBS

## 2023-04-22 VITALS
DIASTOLIC BLOOD PRESSURE: 55 MMHG | OXYGEN SATURATION: 99 % | SYSTOLIC BLOOD PRESSURE: 115 MMHG | TEMPERATURE: 100 F | RESPIRATION RATE: 16 BRPM | HEART RATE: 69 BPM | WEIGHT: 293 LBS

## 2023-04-22 DIAGNOSIS — R11.0 NAUSEA: ICD-10-CM

## 2023-04-22 DIAGNOSIS — Z90.49 ACQUIRED ABSENCE OF OTHER SPECIFIED PARTS OF DIGESTIVE TRACT: Chronic | ICD-10-CM

## 2023-04-22 DIAGNOSIS — Z87.19 PERSONAL HISTORY OF OTHER DISEASES OF THE DIGESTIVE SYSTEM: ICD-10-CM

## 2023-04-22 DIAGNOSIS — R07.89 OTHER CHEST PAIN: ICD-10-CM

## 2023-04-22 DIAGNOSIS — R10.13 EPIGASTRIC PAIN: ICD-10-CM

## 2023-04-22 DIAGNOSIS — Z90.49 ACQUIRED ABSENCE OF OTHER SPECIFIED PARTS OF DIGESTIVE TRACT: ICD-10-CM

## 2023-04-22 DIAGNOSIS — I44.0 ATRIOVENTRICULAR BLOCK, FIRST DEGREE: ICD-10-CM

## 2023-04-22 LAB
ALBUMIN SERPL ELPH-MCNC: 3.8 G/DL — SIGNIFICANT CHANGE UP (ref 3.5–5.2)
ALBUMIN SERPL ELPH-MCNC: 4 G/DL — SIGNIFICANT CHANGE UP (ref 3.5–5.2)
ALP SERPL-CCNC: 87 U/L — SIGNIFICANT CHANGE UP (ref 30–115)
ALP SERPL-CCNC: 93 U/L — SIGNIFICANT CHANGE UP (ref 30–115)
ALT FLD-CCNC: 9 U/L — LOW (ref 14–37)
ALT FLD-CCNC: 9 U/L — LOW (ref 14–37)
ANION GAP SERPL CALC-SCNC: 10 MMOL/L — SIGNIFICANT CHANGE UP (ref 7–14)
ANION GAP SERPL CALC-SCNC: 11 MMOL/L — SIGNIFICANT CHANGE UP (ref 7–14)
AST SERPL-CCNC: 12 U/L — LOW (ref 14–37)
AST SERPL-CCNC: 22 U/L — SIGNIFICANT CHANGE UP (ref 14–37)
BASOPHILS # BLD AUTO: 0.02 K/UL — SIGNIFICANT CHANGE UP (ref 0–0.2)
BASOPHILS # BLD AUTO: 0.03 K/UL — SIGNIFICANT CHANGE UP (ref 0–0.2)
BASOPHILS NFR BLD AUTO: 0.2 % — SIGNIFICANT CHANGE UP (ref 0–1)
BASOPHILS NFR BLD AUTO: 0.3 % — SIGNIFICANT CHANGE UP (ref 0–1)
BILIRUB SERPL-MCNC: 0.4 MG/DL — SIGNIFICANT CHANGE UP (ref 0.2–1.2)
BILIRUB SERPL-MCNC: 0.4 MG/DL — SIGNIFICANT CHANGE UP (ref 0.2–1.2)
BUN SERPL-MCNC: 8 MG/DL — LOW (ref 10–20)
BUN SERPL-MCNC: 8 MG/DL — LOW (ref 10–20)
CALCIUM SERPL-MCNC: 8.8 MG/DL — SIGNIFICANT CHANGE UP (ref 8.4–10.5)
CALCIUM SERPL-MCNC: 9.4 MG/DL — SIGNIFICANT CHANGE UP (ref 8.4–10.5)
CHLORIDE SERPL-SCNC: 106 MMOL/L — SIGNIFICANT CHANGE UP (ref 98–110)
CHLORIDE SERPL-SCNC: 108 MMOL/L — SIGNIFICANT CHANGE UP (ref 98–110)
CO2 SERPL-SCNC: 24 MMOL/L — SIGNIFICANT CHANGE UP (ref 17–32)
CO2 SERPL-SCNC: 25 MMOL/L — SIGNIFICANT CHANGE UP (ref 17–32)
CREAT SERPL-MCNC: 0.9 MG/DL — SIGNIFICANT CHANGE UP (ref 0.3–1)
CREAT SERPL-MCNC: 1 MG/DL — SIGNIFICANT CHANGE UP (ref 0.3–1)
D DIMER BLD IA.RAPID-MCNC: <150 NG/ML DDU — SIGNIFICANT CHANGE UP
EGFR: 83 ML/MIN/1.73M2 — SIGNIFICANT CHANGE UP
EGFR: 94 ML/MIN/1.73M2 — SIGNIFICANT CHANGE UP
EOSINOPHIL # BLD AUTO: 0.04 K/UL — SIGNIFICANT CHANGE UP (ref 0–0.7)
EOSINOPHIL # BLD AUTO: 0.06 K/UL — SIGNIFICANT CHANGE UP (ref 0–0.7)
EOSINOPHIL NFR BLD AUTO: 0.5 % — SIGNIFICANT CHANGE UP (ref 0–8)
EOSINOPHIL NFR BLD AUTO: 0.6 % — SIGNIFICANT CHANGE UP (ref 0–8)
GLUCOSE SERPL-MCNC: 79 MG/DL — SIGNIFICANT CHANGE UP (ref 70–99)
GLUCOSE SERPL-MCNC: 81 MG/DL — SIGNIFICANT CHANGE UP (ref 70–99)
HCT VFR BLD CALC: 35.9 % — LOW (ref 37–47)
HCT VFR BLD CALC: 36.1 % — LOW (ref 37–47)
HGB BLD-MCNC: 11.9 G/DL — LOW (ref 12–16)
HGB BLD-MCNC: 12 G/DL — SIGNIFICANT CHANGE UP (ref 12–16)
IMM GRANULOCYTES NFR BLD AUTO: 0.2 % — SIGNIFICANT CHANGE UP (ref 0.1–0.3)
IMM GRANULOCYTES NFR BLD AUTO: 0.3 % — SIGNIFICANT CHANGE UP (ref 0.1–0.3)
LIDOCAIN IGE QN: 16 U/L — SIGNIFICANT CHANGE UP (ref 7–60)
LIDOCAIN IGE QN: 17 U/L — SIGNIFICANT CHANGE UP (ref 7–60)
LYMPHOCYTES # BLD AUTO: 2.38 K/UL — SIGNIFICANT CHANGE UP (ref 1.2–3.4)
LYMPHOCYTES # BLD AUTO: 2.88 K/UL — SIGNIFICANT CHANGE UP (ref 1.2–3.4)
LYMPHOCYTES # BLD AUTO: 28.5 % — SIGNIFICANT CHANGE UP (ref 20.5–51.1)
LYMPHOCYTES # BLD AUTO: 30.1 % — SIGNIFICANT CHANGE UP (ref 20.5–51.1)
MCHC RBC-ENTMCNC: 26.3 PG — LOW (ref 27–31)
MCHC RBC-ENTMCNC: 26.5 PG — LOW (ref 27–31)
MCHC RBC-ENTMCNC: 33.1 G/DL — SIGNIFICANT CHANGE UP (ref 32–37)
MCHC RBC-ENTMCNC: 33.2 G/DL — SIGNIFICANT CHANGE UP (ref 32–37)
MCV RBC AUTO: 79.4 FL — LOW (ref 81–99)
MCV RBC AUTO: 79.9 FL — LOW (ref 81–99)
MONOCYTES # BLD AUTO: 0.73 K/UL — HIGH (ref 0.1–0.6)
MONOCYTES # BLD AUTO: 0.78 K/UL — HIGH (ref 0.1–0.6)
MONOCYTES NFR BLD AUTO: 7.6 % — SIGNIFICANT CHANGE UP (ref 1.7–9.3)
MONOCYTES NFR BLD AUTO: 9.4 % — HIGH (ref 1.7–9.3)
NEUTROPHILS # BLD AUTO: 5.1 K/UL — SIGNIFICANT CHANGE UP (ref 1.4–6.5)
NEUTROPHILS # BLD AUTO: 5.85 K/UL — SIGNIFICANT CHANGE UP (ref 1.4–6.5)
NEUTROPHILS NFR BLD AUTO: 61.1 % — SIGNIFICANT CHANGE UP (ref 42.2–75.2)
NEUTROPHILS NFR BLD AUTO: 61.2 % — SIGNIFICANT CHANGE UP (ref 42.2–75.2)
NRBC # BLD: 0 /100 WBCS — SIGNIFICANT CHANGE UP (ref 0–0)
NRBC # BLD: 0 /100 WBCS — SIGNIFICANT CHANGE UP (ref 0–0)
PLATELET # BLD AUTO: 270 K/UL — SIGNIFICANT CHANGE UP (ref 130–400)
PLATELET # BLD AUTO: 280 K/UL — SIGNIFICANT CHANGE UP (ref 130–400)
PMV BLD: 11 FL — HIGH (ref 7.4–10.4)
PMV BLD: 11.6 FL — HIGH (ref 7.4–10.4)
POTASSIUM SERPL-MCNC: 4.1 MMOL/L — SIGNIFICANT CHANGE UP (ref 3.5–5)
POTASSIUM SERPL-MCNC: 5.1 MMOL/L — HIGH (ref 3.5–5)
POTASSIUM SERPL-SCNC: 4.1 MMOL/L — SIGNIFICANT CHANGE UP (ref 3.5–5)
POTASSIUM SERPL-SCNC: 5.1 MMOL/L — HIGH (ref 3.5–5)
PROT SERPL-MCNC: 6.8 G/DL — SIGNIFICANT CHANGE UP (ref 6.1–8)
PROT SERPL-MCNC: 6.8 G/DL — SIGNIFICANT CHANGE UP (ref 6.1–8)
RBC # BLD: 4.52 M/UL — SIGNIFICANT CHANGE UP (ref 4.2–5.4)
RBC # BLD: 4.52 M/UL — SIGNIFICANT CHANGE UP (ref 4.2–5.4)
RBC # FLD: 13.6 % — SIGNIFICANT CHANGE UP (ref 11.5–14.5)
RBC # FLD: 13.6 % — SIGNIFICANT CHANGE UP (ref 11.5–14.5)
SODIUM SERPL-SCNC: 140 MMOL/L — SIGNIFICANT CHANGE UP (ref 135–146)
SODIUM SERPL-SCNC: 144 MMOL/L — SIGNIFICANT CHANGE UP (ref 135–146)
WBC # BLD: 8.34 K/UL — SIGNIFICANT CHANGE UP (ref 4.8–10.8)
WBC # BLD: 9.58 K/UL — SIGNIFICANT CHANGE UP (ref 4.8–10.8)
WBC # FLD AUTO: 8.34 K/UL — SIGNIFICANT CHANGE UP (ref 4.8–10.8)
WBC # FLD AUTO: 9.58 K/UL — SIGNIFICANT CHANGE UP (ref 4.8–10.8)

## 2023-04-22 PROCEDURE — 93010 ELECTROCARDIOGRAM REPORT: CPT | Mod: 76

## 2023-04-22 PROCEDURE — 71046 X-RAY EXAM CHEST 2 VIEWS: CPT | Mod: 26

## 2023-04-22 PROCEDURE — 36415 COLL VENOUS BLD VENIPUNCTURE: CPT

## 2023-04-22 PROCEDURE — 93005 ELECTROCARDIOGRAM TRACING: CPT

## 2023-04-22 PROCEDURE — 99285 EMERGENCY DEPT VISIT HI MDM: CPT | Mod: 25

## 2023-04-22 PROCEDURE — 96374 THER/PROPH/DIAG INJ IV PUSH: CPT | Mod: XU

## 2023-04-22 PROCEDURE — 99285 EMERGENCY DEPT VISIT HI MDM: CPT

## 2023-04-22 PROCEDURE — 71046 X-RAY EXAM CHEST 2 VIEWS: CPT

## 2023-04-22 PROCEDURE — 93010 ELECTROCARDIOGRAM REPORT: CPT

## 2023-04-22 PROCEDURE — 99284 EMERGENCY DEPT VISIT MOD MDM: CPT | Mod: 1L

## 2023-04-22 PROCEDURE — 85025 COMPLETE CBC W/AUTO DIFF WBC: CPT

## 2023-04-22 PROCEDURE — 83690 ASSAY OF LIPASE: CPT

## 2023-04-22 PROCEDURE — 96375 TX/PRO/DX INJ NEW DRUG ADDON: CPT

## 2023-04-22 PROCEDURE — 71046 X-RAY EXAM CHEST 2 VIEWS: CPT | Mod: 26,76

## 2023-04-22 PROCEDURE — 74177 CT ABD & PELVIS W/CONTRAST: CPT | Mod: MA

## 2023-04-22 PROCEDURE — C9113: CPT

## 2023-04-22 PROCEDURE — 74177 CT ABD & PELVIS W/CONTRAST: CPT | Mod: 26,MA

## 2023-04-22 PROCEDURE — 80053 COMPREHEN METABOLIC PANEL: CPT

## 2023-04-22 PROCEDURE — 85379 FIBRIN DEGRADATION QUANT: CPT

## 2023-04-22 RX ORDER — FAMOTIDINE 10 MG/ML
20 INJECTION INTRAVENOUS ONCE
Refills: 0 | Status: COMPLETED | OUTPATIENT
Start: 2023-04-22 | End: 2023-04-22

## 2023-04-22 RX ORDER — ACETAMINOPHEN 500 MG
650 TABLET ORAL ONCE
Refills: 0 | Status: COMPLETED | OUTPATIENT
Start: 2023-04-22 | End: 2023-04-22

## 2023-04-22 RX ORDER — PANTOPRAZOLE SODIUM 20 MG/1
40 TABLET, DELAYED RELEASE ORAL ONCE
Refills: 0 | Status: COMPLETED | OUTPATIENT
Start: 2023-04-22 | End: 2023-04-22

## 2023-04-22 RX ORDER — ONDANSETRON 8 MG/1
4 TABLET, FILM COATED ORAL ONCE
Refills: 0 | Status: COMPLETED | OUTPATIENT
Start: 2023-04-22 | End: 2023-04-22

## 2023-04-22 RX ORDER — OMEPRAZOLE 10 MG/1
1 CAPSULE, DELAYED RELEASE ORAL
Qty: 14 | Refills: 0
Start: 2023-04-22 | End: 2023-05-05

## 2023-04-22 RX ORDER — DIPHENHYDRAMINE HYDROCHLORIDE AND LIDOCAINE HYDROCHLORIDE AND ALUMINUM HYDROXIDE AND MAGNESIUM HYDRO
30 KIT ONCE
Refills: 0 | Status: COMPLETED | OUTPATIENT
Start: 2023-04-22 | End: 2023-04-22

## 2023-04-22 RX ORDER — SUCRALFATE 1 G
1 TABLET ORAL ONCE
Refills: 0 | Status: COMPLETED | OUTPATIENT
Start: 2023-04-22 | End: 2023-04-22

## 2023-04-22 RX ADMIN — Medication 650 MILLIGRAM(S): at 05:39

## 2023-04-22 RX ADMIN — FAMOTIDINE 20 MILLIGRAM(S): 10 INJECTION INTRAVENOUS at 15:36

## 2023-04-22 RX ADMIN — Medication 650 MILLIGRAM(S): at 06:51

## 2023-04-22 RX ADMIN — DIPHENHYDRAMINE HYDROCHLORIDE AND LIDOCAINE HYDROCHLORIDE AND ALUMINUM HYDROXIDE AND MAGNESIUM HYDRO 30 MILLILITER(S): KIT at 15:45

## 2023-04-22 RX ADMIN — Medication 1 GRAM(S): at 15:44

## 2023-04-22 RX ADMIN — PANTOPRAZOLE SODIUM 40 MILLIGRAM(S): 20 TABLET, DELAYED RELEASE ORAL at 13:54

## 2023-04-22 RX ADMIN — Medication 650 MILLIGRAM(S): at 15:36

## 2023-04-22 RX ADMIN — DIPHENHYDRAMINE HYDROCHLORIDE AND LIDOCAINE HYDROCHLORIDE AND ALUMINUM HYDROXIDE AND MAGNESIUM HYDRO 30 MILLILITER(S): KIT at 05:41

## 2023-04-22 RX ADMIN — ONDANSETRON 4 MILLIGRAM(S): 8 TABLET, FILM COATED ORAL at 05:38

## 2023-04-22 NOTE — ED PROVIDER NOTE - CLINICAL SUMMARY MEDICAL DECISION MAKING FREE TEXT BOX
Pt with epig pain, likely gastritis, labs reassuring, advised symptomatic tx.  f/u with her gi outpt.  Any ordered labs and EKG were reviewed.  Any imaging was ordered and reviewed by me.  Appropriate medications for patient's presenting complaints were ordered and effects were reassessed.  Patient's records (prior hospital, ED visit, and/or nursing home notes if available) were reviewed.  Additional history was obtained from EMS, family, and/or PCP (where available).  Escalation to admission/observation was considered.  1) However patient feels much better and is comfortable with discharge.  Appropriate follow-up was arranged.

## 2023-04-22 NOTE — ED PROVIDER NOTE - PATIENT PORTAL LINK FT
You can access the FollowMyHealth Patient Portal offered by [facility] by registering at the following website: [email or web address]. By joining [employer]’s FollowMyHealth portal, you will also be able to view your health information using other applications (apps) compatible with our system.

## 2023-04-22 NOTE — ED PROVIDER NOTE - ATTENDING CONTRIBUTION TO CARE
I personally evaluated the patient. I reviewed the Resident’s or Physician Assistant’s note (as assigned above), and agree with the findings and plan except as documented in my note.  17-year-old female with no past medical history, past surgical history of cholecystectomy presents with 3 days of burning chest pain.  Patient reports the symptoms are exacerbated by eating.  Denies any shortness of breath, Fever, coughing.  Patient also reports that she syncopized while in abdominal 3 days ago.  Patient has been prescribed Pepcid and states that it helps her however her symptoms are worse at night.  Of note this is patient's fifth visit in the past 3 days for similar symptoms.  Patient has had normal EKG, chest x-ray, labs.  VSS, non toxic appearing, NAD, Head NCAT, MMM, neck supple, normal ROM, normal s1s2, lungs ctab, abd s/nt/nd, no guarding or rebound, extremities wnl, AAO x 3, GCS 15, neuro grossly normal. No acute skin lesions. Plan is EKG labs imaging, reassess.

## 2023-04-22 NOTE — ED PROVIDER NOTE - PATIENT PORTAL LINK FT
You can access the FollowMyHealth Patient Portal offered by [facility] by registering at the following website: [email or web address]. By joining [employer]’s FollowMyHealth portal, you will also be able to view your health information using other applications (apps) compatible with our system.

## 2023-04-22 NOTE — ED PROVIDER NOTE - OBJECTIVE STATEMENT
17-year-old female with PMH of cholecystectomy, no other PMH, seen in the ED by me twice yesterday as well as twice by other providers the day before for evaluation of burning mid-sternal CP, presents to ED for same complaint.  Here patient reports chest pressure intermittent over the last 7 days.  Here she had EKG which showed prolonged MS otherwise WNL, CXR WNL, labs including troponin and LFTs, WNL, RUQ sono WNL, felt better after getting Motrin/Pepcid/Zofran/Magic mouthwash and was discharged home with rapid referral for gastroenterology.  Patient states she feels better after getting OTC meds from pharmacy however last night she ate a salad with tomatoes in it around 9 PM and then woke up with recurrence of her burning midsternal chest pain.  States she already took her Pepcid for the day so came back to ED tonight. + Nausea.  Denies fever/chills, SOB, vomiting or diarrhea, black or bloody stool, urinary symptoms, vaginal bleeding or discharge, rash.  LMP 2 weeks ago. Has GI follow up in 5 days made with ED referral program yesterday.

## 2023-04-22 NOTE — ED PEDIATRIC TRIAGE NOTE - CHIEF COMPLAINT QUOTE
Sarita with "heartburn." Was recently treated 2 days ago for "heartburn" and sent home with pepcid. C/o nausea no vomiting.

## 2023-04-22 NOTE — ED PROVIDER NOTE - ATTENDING CONTRIBUTION TO CARE
17-year-old female past medical history of obesity, cholecystectomy, gastritis, presents with epigastric burning constant sharp radiates to chest associated with shortness of breath.  No fever cough.  No nausea vomiting diarrhea.  Patient has had 4-5 ED visits in the last 3 days for same symptoms.  No dysuria frequency hematuria.  No leg pain swelling immobilization hormones hemoptysis.  In the last 4-5 visits she has had negative EKG troponin D-dimer pregnancy test lab work and ultrasound.    On exam, AFVSS, Well appearing, No acute distress, NCAT, EOMI, PERRLA, MMM, Neck supple, LCTAB, RRR nl s1s2 No mrg, Abdomen Soft mild epigastric/right upper quadrant tenderness to palpation, no CVA tenderness, ND, AAOx3, No Focal Deficits, No LE edema or calf TTP,    A/P; abdominal pain, radiates to chest, will do labs CT scan EKG chest x-ray PPI reeval

## 2023-04-22 NOTE — ED PROVIDER NOTE - NSFOLLOWUPINSTRUCTIONS_ED_ALL_ED_FT
You were given a school note to go to your medical appointment on Monday.   You MUST call the office Monday morning and go between 9am and 11am to be evaluated by Dr Johnston or her associates for Pediatric Gastroenterology. She will determine any further evaluations.   She may not be able to see you if you do not go during those hours.     Take your prescribed medications and Tylenol as needed for pain and symptom control.     Gastritis    Gastritis is soreness and swelling (inflammation) of the lining of the stomach. Gastritis can develop as a sudden onset (acute) or long-term (chronic) condition. If gastritis is not treated, it can lead to stomach bleeding and ulcers. Causes include viral and bacterial infections, excessive alcohol consumption, tobacco use, or certain medications. Symptoms include abdominal pain or burning especially after eating, nausea, vomiting. Avoid foods or drinks that make your symptoms worse such as caffeine, chocolate, spicy foods, acidic foods, alcohol.    SEEK IMMEDIATE MEDICAL CARE IF YOU HAVE THE FOLLOWING SYMPTOMS: black or bloody stools, blood or coffee-ground-colored vomitus, worsening abdominal pain, fever, or inability to keep fluids down.

## 2023-04-22 NOTE — ED PROVIDER NOTE - CARE PROVIDER_API CALL
Nya Johnston)  Pediatric Gastroenterology  [facility], [address] [place] [address]  Phone: [phone]  Fax: [phone]  Follow Up Time:

## 2023-04-22 NOTE — ED PROVIDER NOTE - OBJECTIVE STATEMENT
Pt is a 16 y/o female with pmhx of cholecystectomy presents for persistent epigastric abdominal pain that has been worsening over the past 2 week.s Pt has had multiple ED visits over the past few days with most recent earlier this morning where she was discharged. Labs, imaging, and EKG did not show significant findings and pt states she has GI appointment on Thursday (5 days from now). Pt admits to same complaints today with epigastric abdominal pain. She denies any current fever, chills, cough, sore throat, N/V/D/C, SoB, or CP.

## 2023-04-22 NOTE — ED PROVIDER NOTE - DATE/TIME 2
Chief Complaint   Patient presents with   • Consultation      Consult request from Jose Enrique Gaona MD for Infantile atopic dermatitis. Patient's mother concerned about allergies, has never had testing.     SUBJECTIVE:  Dwayne is a 1 year old male here for the following concerns.     The patient is seen in consultation for atopic dermatitis since infancy.  The eczema comes and goes.  Record indicates they have been applying  creams.  The child was given loratadine and Zyrtec without consistent with coumadin.  They are using hydrocortisone and Serevent products.    Mom has used hydrocortisone 2.5%, vaseline, dye and fragrance free, Dove for sensitive skin.  Free and Clear.  She uses Bounce dryer sheets.     Sometimes he doesn't like the lotion.  This summer it seems worse.  It used to be on neck, feet and hands.  It has been on his torso and upper legs as well.     He gets itchy at night. Mom has been giving him claritin.  They tried zyrtec probably a tsp.     No rhinorrhea, nasal congestion, some itchy, red, eyes.  No wheezing, coughing, shortness of breath.     No history of allergy.  He eats scrambled egg, peanuts, tree nuts, sesame seed and hummus.     Mom is questioning what might be \"causing\" the skin problems. She wonders if he is allergic to grass because skin tends to flare after runs around plays in the grass.      Current Outpatient Medications   Medication   • hydroCORTisone (CORTIZONE) 2.5 % cream     No current facility-administered medications for this visit.      ALLERGY: has No Known Allergies.  has no history of Bee Sting/Venom Allergy   has no history of Food Allergy     PAST SURGICAL HISTORY:  Past Surgical History:   Procedure Laterality Date   • Circumcision clamp w reg block penile ring  2016     PAST MEDICAL HISTORY:  Past Medical History:   Diagnosis Date   •  screening tests negative      FAMILY HISTORY:  Family History   Problem Relation Age of Onset   • Eczema Mother          young adult but now better   • Gastrointestinal Father         IBS - not diagnosed   • Eczema Father         dry skin   • Heart Paternal Grandfather    • Gastrointestinal Paternal Grandfather         ?colitis   • Cancer, Colon Paternal Grandfather    • Heart Other 70        MGGF   • Hypertension Paternal Grandmother    • Sudden Death Neg Hx         <      SOCIAL HISTORY:  He    Review of patient's social economics indicates:  No social economics status on file       ENVIRONMENTAL HISTORY:  He lives in a House. Pets: He has 2 dog(s). The home has Forced Air heat. Air-conditioning Central air. The bedroom has Carpet. He sleeps on a foam rubber mattress. Basement: dry. Mold is not apparent in the home. Cockroaches are not present in the home.     REVIEW OF SYSTEMS:  He denies fatigue. Denies fever, chills, or sweats. Denies weight loss.   reports eye itching. denies eye tearing.  reports redness of eyes. No problems with taste or smell. denies postnasal drainage. No murmur. No chest pain. No palpitations.   denies wheezing. denies coughing.  denies shortness of breath.  denies heartburn. Denies abdominal pain, vomiting or diarrhea.  Denies hematuria or dysuria.   Denies red or swollen joints.   denies hives.  reports eczema.  Denies headaches. Denies anxiety. Denies depression.   Denies diabetes. Denies thyroid problems.    Denies anemia. Denies chronic infections.     PHYSICAL EXAMINATION:   GENERAL: He is in no acute distress.   VITAL SIGNS:   Visit Vitals  BP (!) 86/52   Pulse 98   Temp 98.4 °F (36.9 °C) (Oral)   Resp 18   Ht 3' 4\" (1.016 m)   Wt 15.7 kg   BMI 15.25 kg/m²      HEENT: Conjunctivae are not injected. Extraocular movements intact.   External ear exam normal. Tympanic membranes normal.   Nasal mucosa pink, no congestion.  There is no turbinate hypertrophy, polyps, bleeding, or ulcer. There is not drainage. Oropharynx is clear. There is no thrush. Lips, gums and teeth are normal. There is not  cobblestoning appreciable.   NECK: Supple and flexible without  cervical lymphadenopathy. Trachea midline. There is no stridor. No thyromegaly.  CHEST: Clear to auscultation. There are no retractions or visible respiratory distress.   no wheezing. No rales or rhonchi. There is good air movement throughout.   CARDIAC: Regular rate and rhythm. Normal S1, S2. No murmur, gallop or rub. There is no pedal edema.   ABDOMEN: Soft without  epigastric tenderness or rebound. No hepatomegaly or splenomegaly.   SKIN: Warm and dry. NEUROLOGIC: The patient is alert and oriented   x3, appropriate for age. Normal affect.  LYMPH NODES: There are no axillary or supraclavicular lymph nodes palpable.     Based on today's evaluation, I made the following assessment and recommendations:    Dwayne was seen today for consultation.    Diagnoses and all orders for this visit:    Atopic dermatitis, unspecified type    Other orders  -     triamcinolone (ARISTOCORT) 0.1 % cream; Apply to affected area bid prn      I explained to the child's mother that his skin condition is genetic Christiano related.  We also discussed in detail that at this point would recommend any allergy skin testing.  The recommendations now is that we try to avoid skin testing for foods as there are many false positives particularly in atopic dermatitis patients.  This results in food allergy avoidance unnecessarily which results in sensitization progressing into true food allergy.    We discussed that the most important issue right now is that we keep the child comfortable in treating his atopic dermatitis appropriately.  I recommended showering daily after playing outside.  Triamcinolone cream twice daily to the very thickened areas as around the ankles.  Use the Vaseline twice daily after showering pat dry and apply the Vaseline liberally.  She may still use the hydrocortisone to mildly exacerbated area as add an as-needed basis.  Again we discussed using the steroid cream  sparingly.    I told mom to watch for any allergic rhinitis symptoms or asthma symptoms as he is at risk for these.    I told mom to keep feeding the child all foods as he right now has been tolerating them including egg milk peanut and tree nuts.    We discussed that if his skin for is not well controlled we could consider Dermatology input.    I would like to follow up in a few months to see how he is doing.  I recommended Zyrtec 10 mg at bedtime for the next few weeks to see if we can break the itch scratch cycle which I think may be causing the severe exacerbation around his ankles at this time.    I also discussed that he has contact with grass could be an irritant or contact reaction but that the skin testing for grasses for the pollen which is looking more for rhinitis or asthma type symptoms.  We did discuss in detail that there are 2 dogs in the home and I recommended keeping the dog out of the bedroom and a HEPA filter for the bedroom.  We also discussed dust mite avoidance measures.  In the future we may consider skin testing for environmental allergens but he does not have a lot of rhinitis symptoms at this time so would hold off.    Skin care handouts were given.     Greater than 50% of the 45 visit was spent counseling.    PLAN: Return in about 2 months (around 11/12/2019).     Try zyrtec 10mg daily at night.     Triamcinolone would be helpful for the ankles and other thicker skinned areas that are flaring.      Hydrocortisone could be used for more minor flares and sensitive skin areas.       Thank you for allowing me to share in his care.   Eri Henry MD     22-Apr-2023 09:17

## 2023-04-22 NOTE — ED PROVIDER NOTE - NSFOLLOWUPINSTRUCTIONS_ED_ALL_ED_FT
Abdominal Pain    Many things can cause abdominal pain. Many times, abdominal pain is not caused by a disease and will improve without treatment. Your health care provider will do a physical exam to determine if there is a dangerous cause of your pain; blood tests and imaging may help determine the cause of your pain. However, in many cases, no cause may be found and you may need further testing as an outpatient. Monitor your abdominal pain for any changes.     SEEK IMMEDIATE MEDICAL CARE IF YOU HAVE ANY OF THE FOLLOWING SYMPTOMS: worsening abdominal pain, uncontrollable vomiting, profuse diarrhea, inability to have bowel movements or pass gas, black or bloody stools, fever accompanying chest pain or back pain, or fainting. These symptoms may represent a serious problem that is an emergency. Do not wait to see if the symptoms will go away. Get medical help right away. Call 911 and do not drive yourself to the hospital.    PLEASE FOLLOW UP WITH YOUR GI APPOINTMENT ON THURSDAY.

## 2023-04-22 NOTE — ED PROVIDER NOTE - PROGRESS NOTE DETAILS
KA - Spoke with Dr Johnston about pt and discussion about what we could offer pt. Dr Johnston stated she was willing to see pt in office Monday morning between 9am-11am to determine any need for further evaluation. She stated without emergent reasoning, any GI workup would likely be done during the week anyway. KA - Spoke with pt about plan. Pt is agreeable and stated she has class Monday. Will provide school note to excuse from class. Pt informed she must reach out to office tomorrow or Monday morning to go into the office between 9am-11am. Pt verbalized understanding of importance to go during these times.

## 2023-04-22 NOTE — ED PROVIDER NOTE - ADDITIONAL NOTES AND INSTRUCTIONS:
Ms. Light is to be excused from all work and school activities during the week of 4/24-4/28 for medical reasons.

## 2023-04-22 NOTE — ED PROVIDER NOTE - PHYSICAL EXAMINATION
As Follows:  CONST: Well appearing in NAD  EYES: EOMI, Sclera and conjunctiva clear.   CARD: Normal S1 S2; Normal rate and rhythm  RESP: Equal BS B/L, No wheezes, rhonchi or rales. No distress  GI: Epigastric tenderness. Soft, non-distended.  MSK: Midsternal chest tenderness.  SKIN: Warm, dry, no acute rashes. Good turgor  NEURO: A&Ox3, No focal deficits.

## 2023-04-22 NOTE — ED PROVIDER NOTE - PROGRESS NOTE DETAILS
CHRISTI: pt signed out to Dr. Mckeon pending labs, CXR and disposition BK: Patient reassessed and resting comfortably.  Abdomen nontender vital signs stable.  Results explained to patient and understands need to follow-up with her GI appointment on Thursday.  Will send PPI to pharmacy.  Strict return precautions given.  Patient stable for discharge.

## 2023-04-23 ENCOUNTER — INPATIENT (INPATIENT)
Facility: HOSPITAL | Age: 18
LOS: 0 days | Discharge: HOME | DRG: 243 | End: 2023-04-24
Attending: PEDIATRICS | Admitting: PEDIATRICS
Payer: MEDICAID

## 2023-04-23 ENCOUNTER — TRANSCRIPTION ENCOUNTER (OUTPATIENT)
Age: 18
End: 2023-04-23

## 2023-04-23 VITALS
RESPIRATION RATE: 18 BRPM | TEMPERATURE: 98 F | SYSTOLIC BLOOD PRESSURE: 132 MMHG | HEART RATE: 76 BPM | DIASTOLIC BLOOD PRESSURE: 64 MMHG | OXYGEN SATURATION: 100 % | WEIGHT: 293 LBS

## 2023-04-23 DIAGNOSIS — Z90.49 ACQUIRED ABSENCE OF OTHER SPECIFIED PARTS OF DIGESTIVE TRACT: Chronic | ICD-10-CM

## 2023-04-23 DIAGNOSIS — K21.9 GASTRO-ESOPHAGEAL REFLUX DISEASE WITHOUT ESOPHAGITIS: ICD-10-CM

## 2023-04-23 LAB
ALBUMIN SERPL ELPH-MCNC: 4.2 G/DL — SIGNIFICANT CHANGE UP (ref 3.5–5.2)
ALP SERPL-CCNC: 97 U/L — SIGNIFICANT CHANGE UP (ref 30–115)
ALT FLD-CCNC: 12 U/L — LOW (ref 14–37)
ANION GAP SERPL CALC-SCNC: 12 MMOL/L — SIGNIFICANT CHANGE UP (ref 7–14)
AST SERPL-CCNC: 13 U/L — LOW (ref 14–37)
BASOPHILS # BLD AUTO: 0.02 K/UL — SIGNIFICANT CHANGE UP (ref 0–0.2)
BASOPHILS NFR BLD AUTO: 0.2 % — SIGNIFICANT CHANGE UP (ref 0–1)
BILIRUB DIRECT SERPL-MCNC: <0.2 MG/DL — SIGNIFICANT CHANGE UP (ref 0–0.3)
BILIRUB INDIRECT FLD-MCNC: >0.2 MG/DL — SIGNIFICANT CHANGE UP (ref 0.2–1.2)
BILIRUB SERPL-MCNC: 0.4 MG/DL — SIGNIFICANT CHANGE UP (ref 0.2–1.2)
BUN SERPL-MCNC: 7 MG/DL — LOW (ref 10–20)
CALCIUM SERPL-MCNC: 9.8 MG/DL — SIGNIFICANT CHANGE UP (ref 8.4–10.4)
CHLORIDE SERPL-SCNC: 104 MMOL/L — SIGNIFICANT CHANGE UP (ref 98–110)
CO2 SERPL-SCNC: 24 MMOL/L — SIGNIFICANT CHANGE UP (ref 17–32)
CREAT SERPL-MCNC: 0.9 MG/DL — SIGNIFICANT CHANGE UP (ref 0.3–1)
EGFR: 94 ML/MIN/1.73M2 — SIGNIFICANT CHANGE UP
EOSINOPHIL # BLD AUTO: 0.07 K/UL — SIGNIFICANT CHANGE UP (ref 0–0.7)
EOSINOPHIL NFR BLD AUTO: 0.7 % — SIGNIFICANT CHANGE UP (ref 0–8)
GLUCOSE SERPL-MCNC: 71 MG/DL — SIGNIFICANT CHANGE UP (ref 70–99)
HCG SERPL QL: NEGATIVE — SIGNIFICANT CHANGE UP
HCT VFR BLD CALC: 38.3 % — SIGNIFICANT CHANGE UP (ref 37–47)
HGB BLD-MCNC: 12.4 G/DL — SIGNIFICANT CHANGE UP (ref 12–16)
IMM GRANULOCYTES NFR BLD AUTO: 0.4 % — HIGH (ref 0.1–0.3)
LACTATE SERPL-SCNC: 0.9 MMOL/L — SIGNIFICANT CHANGE UP (ref 0.7–2)
LIDOCAIN IGE QN: 14 U/L — SIGNIFICANT CHANGE UP (ref 7–60)
LYMPHOCYTES # BLD AUTO: 2.9 K/UL — SIGNIFICANT CHANGE UP (ref 1.2–3.4)
LYMPHOCYTES # BLD AUTO: 29 % — SIGNIFICANT CHANGE UP (ref 20.5–51.1)
MCHC RBC-ENTMCNC: 25.6 PG — LOW (ref 27–31)
MCHC RBC-ENTMCNC: 32.4 G/DL — SIGNIFICANT CHANGE UP (ref 32–37)
MCV RBC AUTO: 79.1 FL — LOW (ref 81–99)
MONOCYTES # BLD AUTO: 0.79 K/UL — HIGH (ref 0.1–0.6)
MONOCYTES NFR BLD AUTO: 7.9 % — SIGNIFICANT CHANGE UP (ref 1.7–9.3)
NEUTROPHILS # BLD AUTO: 6.19 K/UL — SIGNIFICANT CHANGE UP (ref 1.4–6.5)
NEUTROPHILS NFR BLD AUTO: 61.8 % — SIGNIFICANT CHANGE UP (ref 42.2–75.2)
NRBC # BLD: 0 /100 WBCS — SIGNIFICANT CHANGE UP (ref 0–0)
PLATELET # BLD AUTO: 277 K/UL — SIGNIFICANT CHANGE UP (ref 130–400)
PMV BLD: 11.8 FL — HIGH (ref 7.4–10.4)
POTASSIUM SERPL-MCNC: 4.2 MMOL/L — SIGNIFICANT CHANGE UP (ref 3.5–5)
POTASSIUM SERPL-SCNC: 4.2 MMOL/L — SIGNIFICANT CHANGE UP (ref 3.5–5)
PROT SERPL-MCNC: 7.2 G/DL — SIGNIFICANT CHANGE UP (ref 6.1–8)
RBC # BLD: 4.84 M/UL — SIGNIFICANT CHANGE UP (ref 4.2–5.4)
RBC # FLD: 13.6 % — SIGNIFICANT CHANGE UP (ref 11.5–14.5)
SODIUM SERPL-SCNC: 140 MMOL/L — SIGNIFICANT CHANGE UP (ref 135–146)
WBC # BLD: 10.01 K/UL — SIGNIFICANT CHANGE UP (ref 4.8–10.8)
WBC # FLD AUTO: 10.01 K/UL — SIGNIFICANT CHANGE UP (ref 4.8–10.8)

## 2023-04-23 PROCEDURE — 93010 ELECTROCARDIOGRAM REPORT: CPT

## 2023-04-23 PROCEDURE — 82657 ENZYME CELL ACTIVITY: CPT

## 2023-04-23 PROCEDURE — 93005 ELECTROCARDIOGRAM TRACING: CPT

## 2023-04-23 PROCEDURE — 99285 EMERGENCY DEPT VISIT HI MDM: CPT

## 2023-04-23 PROCEDURE — 99222 1ST HOSP IP/OBS MODERATE 55: CPT

## 2023-04-23 PROCEDURE — 88312 SPECIAL STAINS GROUP 1: CPT

## 2023-04-23 PROCEDURE — C9113: CPT

## 2023-04-23 PROCEDURE — 99254 IP/OBS CNSLTJ NEW/EST MOD 60: CPT

## 2023-04-23 PROCEDURE — 76705 ECHO EXAM OF ABDOMEN: CPT

## 2023-04-23 PROCEDURE — 76705 ECHO EXAM OF ABDOMEN: CPT | Mod: 26

## 2023-04-23 PROCEDURE — 88305 TISSUE EXAM BY PATHOLOGIST: CPT

## 2023-04-23 RX ORDER — ACETAMINOPHEN 500 MG
650 TABLET ORAL EVERY 6 HOURS
Refills: 0 | Status: DISCONTINUED | OUTPATIENT
Start: 2023-04-23 | End: 2023-04-24

## 2023-04-23 RX ORDER — SODIUM CHLORIDE 9 MG/ML
1000 INJECTION, SOLUTION INTRAVENOUS
Refills: 0 | Status: DISCONTINUED | OUTPATIENT
Start: 2023-04-23 | End: 2023-04-24

## 2023-04-23 RX ORDER — SUCRALFATE 1 G
1 TABLET ORAL ONCE
Refills: 0 | Status: COMPLETED | OUTPATIENT
Start: 2023-04-23 | End: 2023-04-23

## 2023-04-23 RX ORDER — PANTOPRAZOLE SODIUM 20 MG/1
40 TABLET, DELAYED RELEASE ORAL EVERY 24 HOURS
Refills: 0 | Status: DISCONTINUED | OUTPATIENT
Start: 2023-04-23 | End: 2023-04-24

## 2023-04-23 RX ORDER — FAMOTIDINE 10 MG/ML
20 INJECTION INTRAVENOUS ONCE
Refills: 0 | Status: COMPLETED | OUTPATIENT
Start: 2023-04-23 | End: 2023-04-23

## 2023-04-23 RX ORDER — POLYETHYLENE GLYCOL 3350 17 G/17G
17 POWDER, FOR SOLUTION ORAL AT BEDTIME
Refills: 0 | Status: DISCONTINUED | OUTPATIENT
Start: 2023-04-23 | End: 2023-04-24

## 2023-04-23 RX ADMIN — Medication 1 GRAM(S): at 22:48

## 2023-04-23 RX ADMIN — SODIUM CHLORIDE 100 MILLILITER(S): 9 INJECTION, SOLUTION INTRAVENOUS at 09:59

## 2023-04-23 RX ADMIN — Medication 650 MILLIGRAM(S): at 21:52

## 2023-04-23 RX ADMIN — PANTOPRAZOLE SODIUM 40 MILLIGRAM(S): 20 TABLET, DELAYED RELEASE ORAL at 09:58

## 2023-04-23 RX ADMIN — POLYETHYLENE GLYCOL 3350 17 GRAM(S): 17 POWDER, FOR SOLUTION ORAL at 22:48

## 2023-04-23 RX ADMIN — Medication 650 MILLIGRAM(S): at 13:12

## 2023-04-23 RX ADMIN — Medication 650 MILLIGRAM(S): at 12:42

## 2023-04-23 RX ADMIN — Medication 650 MILLIGRAM(S): at 21:27

## 2023-04-23 RX ADMIN — FAMOTIDINE 20 MILLIGRAM(S): 10 INJECTION INTRAVENOUS at 04:38

## 2023-04-23 RX ADMIN — Medication 1 GRAM(S): at 04:38

## 2023-04-23 NOTE — PATIENT PROFILE PEDIATRIC - NS PRO GD 16YRS ABOVE PEDS
secure in body image/gender role/practices good health habits/enhanced independence/views problems comprehensively/effective social interaction skills

## 2023-04-23 NOTE — H&P PEDIATRIC - ATTENDING COMMENTS
17 year old with above HPI admitted with abdominal pain of unknown etiology at this time. All work up (labs, EKG, abd CT and sonogram) unremarkable. Vitals stable and PE normal. Patient complaining of epigastric (heartburn like) pain. EKG showed sinus bradycardia with sinus arrhythmia with 1st degree A-V block, otherwise normal. Seen by GI and scheduled for EGD tomorrow. Vitals stable, afebrile. This morning on exam patient has no pain (says sometimes happen after eating food) and PE unremarkable. Will continue current meds and follow up with GI.

## 2023-04-23 NOTE — ED PROVIDER NOTE - PROGRESS NOTE DETAILS
Authored by Jessica York DO: Pt signed out to Dr. Reyes pending labs results reassessment. CHRISTI: labs again WNL. Discussed in length with patient regarding continuous visits with normal work ups and need for GI follow up as I have personally discussed in length with her during multiple visits. Shared decision making had with patient with plan for admission for intractable pain.  Admitted to peds

## 2023-04-23 NOTE — H&P PEDIATRIC - NSHPREVIEWOFSYSTEMS_GEN_ALL_CORE
Constitutional: (-) fever (-) weakness (-) diaphoresis (-) pain  Eyes: (-) change in vision (-) photophobia (-) eye pain  ENT: (-) sore throat (-) ear pain  (-) nasal discharge (-) congestion  Cardiovascular: (-) chest pain (-) palpitations  Respiratory: (-) SOB (-) cough (-) WOB (-) wheeze (-) tightness  GI: (-) abdominal pain (-) nausea (-) vomiting (-) diarrhea (-) constipation  : (-) dysuria (-) hematuria (-) increased frequency (-) increased urgency  Integumentary: (-) rash (-) redness (-) joint pain (-) MSK pain (-) swelling  Neurological:  (-) focal deficit (-) altered mental status (-) dizziness (-) headache  General: (-) recent travel (-) sick contacts (-) decreased PO (-) urine output Constitutional: (-) fever (-) weakness (-) diaphoresis (-) pain  Eyes: (-) change in vision (-) photophobia (-) eye pain  ENT: (-) sore throat (-) ear pain  (-) nasal discharge (-) congestion  Cardiovascular: (-) chest pain (-) palpitations  Respiratory: (-) SOB (-) cough (-) WOB (-) wheeze (-) tightness  GI: (+) abdominal pain (+) nausea (-) vomiting (-) diarrhea (-) constipation  : (-) dysuria (-) hematuria (-) increased frequency (-) increased urgency  Integumentary: (-) rash (-) redness (-) joint pain (-) MSK pain (-) swelling  Neurological:  (-) focal deficit (-) altered mental status (-) dizziness (-) headache  General: (-) recent travel (-) sick contacts (-) decreased PO (-) urine output

## 2023-04-23 NOTE — ED PEDIATRIC NURSE NOTE - NS ED NURSE LEVEL OF CONSCIOUSNESS ORIENTATION
-Remains in afib with RVR in setting of sepsis/PNA/anemia  -Continue digoxin  -Cardizem 30 mg 6 hours added today  -May use IV lopressor prn as well as BP tolerates  -Switch heparin to Eliquis for CVA prophylaxis if tolerating po  -Monitor H and H   Oriented - self; Oriented - place; Oriented - time

## 2023-04-23 NOTE — H&P PEDIATRIC - NSHPLABSRESULTS_GEN_ALL_CORE
Labs:  CBC Full  -  ( 23 Apr 2023 04:30 )  WBC Count : 10.01 K/uL  RBC Count : 4.84 M/uL  Hemoglobin : 12.4 g/dL  Hematocrit : 38.3 %  Platelet Count - Automated : 277 K/uL  Mean Cell Volume : 79.1 fL  Mean Cell Hemoglobin : 25.6 pg  Mean Cell Hemoglobin Concentration : 32.4 g/dL  Auto Neutrophil # : 6.19 K/uL  Auto Lymphocyte # : 2.90 K/uL  Auto Monocyte # : 0.79 K/uL  Auto Eosinophil # : 0.07 K/uL  Auto Basophil # : 0.02 K/uL  Auto Neutrophil % : 61.8 %  Auto Lymphocyte % : 29.0 %  Auto Monocyte % : 7.9 %  Auto Eosinophil % : 0.7 %  Auto Basophil % : 0.2 %      04-23    140  |  104  |  7<L>  ----------------------------<  71  4.2   |  24  |  0.9    Ca    9.8      23 Apr 2023 04:30    TPro  7.2  /  Alb  4.2  /  TBili  0.4  /  DBili  <0.2  /  AST  13<L>  /  ALT  12<L>  /  AlkPhos  97  04-23    LIVER FUNCTIONS - ( 23 Apr 2023 04:30 )  Alb: 4.2 g/dL / Pro: 7.2 g/dL / ALK PHOS: 97 U/L / ALT: 12 U/L / AST: 13 U/L / GGT: x

## 2023-04-23 NOTE — ED PROVIDER NOTE - ATTENDING CONTRIBUTION TO CARE
17-year-old female past medical history cholecystectomy 2 months ago, GERD, presents to the emergency room for GERD and intractable epigastric abdominal pain for the past month, worsening over the past few days.  Patient reports she is taking Pepcid twice a day with improvement then symptoms returned with 10 out of 10 pain.  Patient reports nausea but no vomiting.  No fevers.  Patient was seen in the emergency department multiple times for same complaint, with unremarkable CT imaging yesterday.  Patient reports every time she goes to lay down to go to sleep at night the pain returns.    Vital Signs: I have reviewed the initial vital signs.  Constitutional: Patient in no acute distress.  Integumentary: No rash.  ENT: MMM  NECK: Supple.   Cardiovascular: RRR, radial pulses 2/4 bilaterally.   Respiratory: Breath sounds CTA b/l, no wheezing or crackles, good air exchange, good resp effort and excursion, no accessory muscle use, no stridor.  Gastrointestinal: Abdomen soft, non-tender, non-distended, no rebound tenderness or guarding, no CVAT.   Musculoskeletal: FROM, no edema, no calf pain/swelling/erythema.  Neurologic: AAOx3, motor 5/5 and sensation intact throughout upper and lower ext.

## 2023-04-23 NOTE — H&P PEDIATRIC - ASSESSMENT
PLAN  RESP  - RA    CVS  - HDS    SERGIO  - Low fat pediatric diet  - D5NS @ 100cc/hr  - Protonix 40mg IV q24h  - GI consult    NEURO  - Tylenol 650mg PO q6h PRN 16yo F w/ history of GERD s/p lap cholecystectomy in Feb 2023 presenting w/ worsening     PLAN  RESP  - RA    CVS  - HDS    FENGI  - Low fat pediatric diet  - D5NS @ 100cc/hr  - Protonix 40mg IV q24h  - GI consult    NEURO  - Tylenol 650mg PO q6h PRN 16yo F w/ history of GERD s/p lap cholecystectomy in Feb 2023 presenting w/ worsening abdominal pain. VS stable. Labs without evidence of leukocytosis and CMP    PLAN  RESP  - RA    CVS  - HDS    FENGI  - Low fat pediatric diet  - D5NS @ 100cc/hr  - Protonix 40mg IV q24h  - GI consult    NEURO  - Tylenol 650mg PO q6h PRN 18yo F w/ history of GERD s/p lap cholecystectomy in Feb 2023 presenting w/ worsening abdominal pain. VS stable. Labs without evidence of leukocytosis and CMP unremarkable. CT performed in ED without any abdominal pathology. PE remarkable for TTP in epigastrium and RUQ, otherwise     PLAN  RESP  - RA    CVS  - HDS    FENGI  - Low fat pediatric diet  - D5NS @ 100cc/hr  - Protonix 40mg IV q24h  - GI consult    NEURO  - Tylenol 650mg PO q6h PRN 18yo F w/ history of GERD s/p lap cholecystectomy in Feb 2023 presenting w/ worsening abdominal pain. VS stable. Labs without evidence of leukocytosis and CMP unremarkable. CT performed in ED without any abdominal pathology. PE remarkable for TTP in epigastrium and RUQ, otherwise unremarkable. Differentials for current presentation include worsening GERD vs peptic ulcer disease, given worsening of pain with eating and location. Given concerns for possible peptic ulcers, would consider pain control without NSAIDs at this time. Will provide Protonix for GI prophylaxis and consult GI with any further recommendations. Will continue to monitor closely.     PLAN  RESP  - RA    CVS  - HDS    FENGI  - Low fat pediatric diet  - D5NS @ 100cc/hr  - Protonix 40mg IV q24h  - GI consult    NEURO  - Tylenol 650mg PO q6h PRN

## 2023-04-23 NOTE — ED PROVIDER NOTE - OBJECTIVE STATEMENT
17-year-old female with PMH of cholecystectomy, no other PMH, seen in the ED 6 times in the last 3 days for evaluation of burning mid-sternal CP, presents to ED for same complaint.  Here patient reports chest pressure intermittent over the last 7 days.  Here she had EKG which showed prolonged [place] otherwise WNL, CXR WNL, labs including troponin and LFTs, WNL, RUQ sono WNL, and CT a/p WNL. felt better after getting Motrin/Pepcid/Zofran/Magic mouthwash and was discharged home with rapid referral for gastroenterology (has appt with Dr. Johnston in 2 days Monday at 9AM).  Patient states she feels better after getting OTC meds from pharmacy however last night she ate a fruit cup before bed and then woke up with recurrence of her burning midsternal chest pain.  States she already took her Pepcid for the day so came back to ED tonight. + Nausea.  Denies fever/chills, SOB, vomiting or diarrhea, black or bloody stool, urinary symptoms, vaginal bleeding or discharge, rash.  LMP 2 weeks ago.

## 2023-04-23 NOTE — ED PROVIDER NOTE - CARE PLAN
1 Principal Discharge DX:	GERD (gastroesophageal reflux disease)  Assessment and plan of treatment:	Plan- labs, meds reassess.   Principal Discharge DX:	GERD (gastroesophageal reflux disease)  Assessment and plan of treatment:	Plan- labs, meds reassess.  Secondary Diagnosis:	Intractable pain

## 2023-04-23 NOTE — DISCHARGE NOTE PROVIDER - PROVIDER TOKENS
PROVIDER:[TOKEN:[[id number]],FOLLOWUP:[1-3 days]],PROVIDER:[TOKEN:[[id number]],FOLLOWUP:[1 week]] PROVIDER:[TOKEN:[[id number]],SCHEDULEDAPPT:[04/28/2023],SCHEDULEDAPPTTIME:[03:00 PM]],PROVIDER:[TOKEN:[[id number]],FOLLOWUP:[1 week]],PROVIDER:[TOKEN:[[id number]],FOLLOWUP:[2 weeks]]

## 2023-04-23 NOTE — DISCHARGE NOTE PROVIDER - CARE PROVIDERS DIRECT ADDRESSES
,[email or web address]

## 2023-04-23 NOTE — PATIENT PROFILE PEDIATRIC - BRADEN SCORE (IF 18 OR LESS ACTIVATE SKIN INJURY RISK INCREASED GUIDELINE), MLM
Physician Discharge Summary Patient ID:   
Darrin Leavitt [id number] 
57 y.o. 
1949 Edilberto Perkins MD 
 
Admit date: 1/26/2021 Discharge date and time: 2/9/2021 Admission Diagnoses: SOB (shortness of breath) [R06.02]; Anemia [D64.9]; Pneumonia [J18.9]; Chronic respiratory failure with hypoxia (HCC) [J96.11];Leukocytosis [D72.829]; Uncontrolled type 2 diabetes mellitus with hyperglycemia ([facility] [E11.65]; Acute on chronic systolic CHF (congestive heart failure) ([facility] [I50.23] Discharge Medications:  
Current Discharge Medication List  
  
START taking these medications Details  
nystatin (MYCOSTATIN) 100,000 unit/mL suspension Take 5 mL by mouth four (4) times daily for 5 days. swish and spit Qty: 100 mL, Refills: 0 CONTINUE these medications which have CHANGED Details  
loperamide (IMODIUM) 2 mg capsule Take 2 Caps by mouth two (2) times daily as needed for Diarrhea. Qty: 30 Cap, Refills: 0  
  
amiodarone (CORDARONE) 200 mg tablet Take 1 Tab by mouth daily. Qty: 60 Tab, Refills: 0  
  
dilTIAZem ER (CARDIZEM CD) 300 mg capsule Take 1 Cap by mouth daily. Qty: 30 Cap, Refills: 0  
  
furosemide (LASIX) 40 mg tablet Take 1 Tab by mouth daily. Qty: 30 Tab, Refills: 0  
  
potassium chloride SR (K-TAB) 20 mEq tablet Take 1 Tab by mouth two (2) times a day. Recheck potassium regularly 
Qty: 30 Tab, Refills: 0 CONTINUE these medications which have NOT CHANGED Details  
ondansetron hcl (Zofran) 4 mg tablet Take 4 mg by mouth every six (6) hours as needed for Nausea or Vomiting. polyethylene glycol (Miralax) 17 gram/dose powder Take 17 g by mouth daily as needed for Constipation. predniSONE (DELTASONE) 20 mg tablet Take 20 mg by mouth daily (with breakfast). Qty: 30 Tab, Refills: 0  
  
lipase-protease-amylase (CREON 24,000) 24,000-76,000 -120,000 unit capsule Take 1 Cap by mouth three (3) times daily (with meals).  
Qty: 90 Cap, Refills: 0  
  
pantoprazole (PROTONIX) 40 mg tablet Take 1 Tab by mouth Daily (before breakfast). Qty: 30 Tab, Refills: 0 phosphorus (K PHOS NEUTRAL) 250 mg tablet Take 1 Tab by mouth two (2) times a day. Qty: 60 Tab, Refills: 0  
  
aspirin 81 mg chewable tablet Take 81 mg by mouth daily. apixaban (ELIQUIS) 5 mg tablet Take 5 mg by mouth two (2) times a day. alendronate (FOSAMAX) 70 mg tablet Take 70 mg by mouth every seven (7) days. On Sundays  
  
zinc oxide 20 % ointment Apply  to affected area three (3) times daily. Bilateral gluteal and perianal area each shift  
  
honey (MediHoney, honey,) 100 % topical paste Apply  to affected area daily. cyanocobalamin 1,000 mcg tablet Take 1 Tab by mouth daily. Qty: 30 Tab, Refills: 0  
  
ferrous sulfate 325 mg (65 mg iron) tablet Take 1 Tab by mouth daily (with breakfast). Qty: 30 Tab, Refills: 0  
  
tiotropium bromide (Spiriva Respimat) 2.5 mcg/actuation inhaler Take 2 Puffs by inhalation daily. montelukast (Singulair) 10 mg tablet Take 10 mg by mouth every evening. sAXagliptin (ONGLYZA) 5 mg tab tablet Take 5 mg by mouth Daily (before dinner). acetaminophen (TYLENOL) 325 mg tablet Take 1 Tab by mouth every four (4) hours as needed for Pain. Qty: 30 Tab, Refills: 0  
  
magnesium oxide (MAG-OX) 400 mg tablet Take 400 mg by mouth nightly. mirtazapine (REMERON) 15 mg tablet Take 15 mg by mouth nightly. albuterol (PROVENTIL VENTOLIN) 2.5 mg /3 mL (0.083 %) nebulizer solution 2.5 mg by Nebulization route every six (6) hours as needed for Wheezing or Shortness of Breath. albuterol (PROAIR HFA) 90 mcg/actuation inhaler Take 2 Puffs by inhalation every four (4) hours as needed. lactobacillus rhamnosus gg 10 billion cell (CULTURELLE) 10 billion cell capsule Take 1 Cap by mouth daily. biotin 10,000 mcg cap Take 1 Cap by mouth daily. DULoxetine (CYMBALTA) 60 mg capsule Take 60 mg by mouth daily.   
  
cholecalciferol, vitamin D3, (Vitamin D3) 50 mcg (2,000 unit) tab Take 2,000 Units by mouth daily. mometasone-formoterol (DULERA) 200-5 mcg/actuation HFA inhaler Take 2 Puffs by inhalation two (2) times a day. STOP taking these medications  
  
 digoxin (LANOXIN) 0.125 mg tablet Comments:  
Reason for Stopping: Follow up Care: 1. Livia Kim MD with in 1 weeks out of rehab 2. specialists as directed. Diet:  Diabetic Diet Disposition: 
SNF. Advanced Directive: 
Discharge Exam: [See today's progress note.] CONSULTATIONS: Cardiology and GI Significant Diagnostic Studies:  
Recent Labs 02/09/21 0451 02/08/21 
2567 WBC 16.3* 16.5* HGB 8.1* 7.9*  
HCT 28.5* 27.7*  
* 446* Recent Labs 02/09/21 
0451 02/08/21 
2211 02/07/21 
0429  139 136  
K 3.9 3.7 3.9 CL 99 98 98 CO2 34* 37* 39* BUN 29* 28* 27* CREA 1.18* 1.20* 1.26* * 133* 126* CA 8.1* 8.3* 7.6* Recent Labs 02/09/21 
0451 02/08/21 
5306 02/07/21 
7208 ALT 30 28 26 * 244* 234* TBILI 0.4 0.6 0.5 TP 6.1* 5.8* 5.7* ALB 2.5* 2.4* 2.3*  
GLOB 3.6 3.4 3.4 Lab Results Component Value Date/Time Glucose (POC) 182 (H) 02/09/2021 11:48 AM  
 Glucose (POC) 132 (H) 02/09/2021 06:24 AM  
 Glucose (POC) 292 (H) 02/08/2021 09:36 PM  
 Glucose (POC) 312 (H) 02/08/2021 03:44 PM  
 Glucose (POC) 211 (H) 02/08/2021 11:26 AM  
 
Lab Results Component Value Date/Time TSH 3.40 01/13/2021 11:29 AM  
 
 
HOSPITAL COURSE & TREATMENT RENDERED:  
1. See today's progress note: 
Daily Progress Note and Discharge Note: 2/9/2021 Jasmyngill Kim MD 
  
   
Assessment/Plan: 1.  Pneumonia      -  IV antibiotics Azithromycin 500 mg IV and Ceftriaxone 1 gram IV completed -  She will need follow up CXR in 2-4 wks       
2.  Shortness of breath (SOB) 
     - with history of chronic hypoxic respiratory failure on 3 liters chronically      - continue supplemental oxygen 
  
3.  Acute on chronic diastolic CHF (HFpEF)      - monitor fluid status closely 
     - ECHO results as under Data Review - little changed cardiac function      - cardiology consulted and managed CHF and fluid status 
     - monitor electrolytes frequently at SNF 
     - monitor I/O at SNF, check pro-BNP at SNF 
  
4.  Negative for COVID 19 virus infection      - COVID 19 test is negative at admission 
     - screening tests 2/6 and 2/7 negative 
  
5.  Leukocytosis      - monitor at SNF, antibiotics completed as above 
  
6. [place] Vasyl      - monitor and transfuse for hemoglobin < 7.0. 
     - monitor frequently at [facility] rehab - Eliquis restarted per GI and Card 
  
7.  Uncontrolled type 2 DM with hyperglycemia      - SSI inpt 
     - resume outpt tx when discharged and monitor for need for med adjustment at SNF 
  
8.  Acute superimposed on chronic kidney disease stage 3:  improved      - monitor at SNF and tx as needed 
  
9.  Elevated alkaline phosphatase - improved      - monitor at SNF and treat as needed 
  
10.  Hypertension 
       - hold on home Lisinopril - HCTZ with elevated creatinine and soft BPs - restart if needed. - On Amlodipine per cards - Dilt/Lasix per Cards 
  
11.  History of GI bleed 
       - s/p upper GI endoscopy and colonoscopy on 1/4/2021 with Adenomatous polyp of transverse colon [D12.3] Candida esophagitis ([facility] [B37.81] Gastritis and duodenitis [  
  - GI consulted 
  
12.  Obesity        - recommended weight loss, heart healthy diet, and lifestyle modification 
  
13.  GERD 
       - Protonix 
  
14.  Paroxysmal atrial fibrillation (afib) 
       - on long term anticoagulation on Eliquis - hold if further bleeding 
       - cleared to restart Eliquis tx per GI/Cardiology 
  
CODE STATUS:  FULL CODE as discussed with patient who requests the same. 
   
  
Problem List: 
          
Problem List as of 2/9/2021 Date Reviewed: 11/2/2020  
        Codes Class Noted - Resolved  
  Pneumonia ICD-10-CM: J18.9 ICD-9-CM: 338   1/26/2021 - Present  
     
  SOB (shortness of breath) ICD-10-CM: R06.02 
ICD-9-CM: 786.05   1/26/2021 - Present  
     
  Anemia ICD-10-CM: D64.9 ICD-9-CM: 872. 9   1/26/2021 - Present  
     
  Acute on chronic systolic CHF (congestive heart failure) (HCC) ICD-10-CM: J69.78 ICD-9-CM: 428.23, 428.0   1/26/2021 - Present  
     
  Uncontrolled type 2 diabetes mellitus with hyperglycemia (HCC) ICD-10-CM: E11.65 ICD-9-CM: 250.02   1/26/2021 - Present  
     
  Left lower lobe pneumonia ICD-10-CM: J18.9 ICD-9-CM: 712   1/1/2021 - Present  
     
  Leukocytosis ICD-10-CM: R57.085 ICD-9-CM: 288.60   11/3/2020 - Present  
     
  Chronic respiratory failure with hypoxia [facility]) ICD-10-CM: J96.11 
ICD-9-CM: 518.83, 799.02   11/2/2020 - Present  
  Overview Signed 11/2/2020 10:12 PM by Kary Bajwa MD  
    On 3L NC at home  
   
  
     
  Cellulitis of lower extremity ICD-10-CM: L03.119 ICD-9-CM: 168. 6   3/23/2020 - Present  
     
  ASO (arteriosclerosis obliterans) ICD-10-CM: I70.90 ICD-9-CM: 440. 9   9/5/2019 - Present  
     
  PVD (peripheral vascular disease) ([facility] ICD-10-CM: I73.9 ICD-9-CM: 443. 9   8/1/2019 - Present  
     
  Severe obesity (HCC) ICD-10-CM: E66.01 
ICD-9-CM: 278.01   7/30/2019 - Present  
     
  COPD (chronic obstructive pulmonary disease) (HCC) ICD-10-CM: J44.9 ICD-9-CM: 496   7/13/2019 - Present  
     
  Normocytic anemia ICD-10-CM: D64.9 ICD-9-CM: 017. 9   7/13/2019 - Present  
     
  PAD (peripheral artery disease) ([facility]) ICD-10-CM: I73.9 ICD-9-CM: 443. 9   7/13/2019 - Present  
     
  Mild intermittent asthma ICD-10-CM: J45.20 ICD-9-CM: 493.90   5/17/2019 - Present  
     
  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  
     
  Sleep apnea ICD-10-CM: G47.30 ICD-9-CM: 780.57   1/5/2019 - Present  
  Overview Signed 1/5/2019  8:41 PM by Ilene Ferguson DO  
    wears CPAP 
   
  
      Atrial fibrillation ([facility] ICD-10-CM: I48.91 
ICD-9-CM: 427.31   11/16/2018 - Present  
     
  Obesity (BMI 30-39.9) (Chronic) ICD-10-CM: T94.6 ICD-9-CM: 278.00   11/13/2018 - Present  
     
  Recurrent deep vein thrombosis (DVT) (HCC) ICD-10-CM: I82.409 ICD-9-CM: 453.40   3/30/2018 - Present  
     
  Chronic anticoagulation (Chronic) ICD-10-CM: Z79.01 
ICD-9-CM: V58.61   3/30/2018 - Present  
     
  Essential hypertension (Chronic) ICD-10-CM: I10 
ICD-9-CM: 112. 9   1/22/2016 - Present  
     
  CAD (coronary artery disease) ICD-10-CM: I25.10 ICD-9-CM: 414.00   10/9/2015 - Present  
     
  Type II diabetes mellitus (HCC) (Chronic) ICD-10-CM: E11.9 ICD-9-CM: 250.00   10/9/2015 - Present  
     
  RESOLVED: Syncope and collapse ICD-10-CM: R55 
ICD-9-CM: 780. 2   9/29/2020 - 11/2/2020  
     
  RESOLVED: Cellulitis ICD-10-CM: L03.90 ICD-9-CM: 078. 9   3/23/2020 - 5/29/2020  
     
  RESOLVED: Hypokalemia ICD-10-CM: E87.6 ICD-9-CM: 276.8   3/23/2020 - 5/29/2020  
     
  RESOLVED: Hyponatremia ICD-10-CM: E87.1 ICD-9-CM: 276.1   3/23/2020 - 5/29/2020  
     
  RESOLVED: Diarrhea ICD-10-CM: R19.7 ICD-9-CM: 787.91   3/23/2020 - 5/29/2020  
     
  RESOLVED: Syncope and collapse ICD-10-CM: R55 
ICD-9-CM: 780. 2   7/13/2019 - 5/29/2020  
     
  RESOLVED: UTI (urinary tract infection) ICD-10-CM: N39.0 ICD-9-CM: 599.0   7/13/2019 - 11/2/2020  
     
  RESOLVED: Sepsis ([facility] ICD-10-CM: A41.9 ICD-9-CM: 038.9, 995.91   7/13/2019 - 11/3/2020  
     
  RESOLVED: Leg wound, left ICD-10-CM: D61.175U ICD-9-CM: 891.0   7/13/2019 - 5/29/2020  
     
  RESOLVED: Leg laceration, right, initial encounter ICD-10-CM: U44.317T ICD-9-CM: 894.0   7/13/2019 - 5/29/2020  
     
  RESOLVED: Cellulitis of right upper arm ICD-10-CM: L03.113 ICD-9-CM: 781. 3   5/17/2019 - 5/29/2020  
     
  RESOLVED: Gangrene of finger of right hand ([facility] ICD-10-CM: L33 
ICD-9-CM: 785. 4   5/17/2019 - 11/2/2020  
     
  RESOLVED: Bowel obstruction [facility]) ICD-10-CM: N00.005 ICD-9-CM: 560.9   1/8/2019 - 5/29/2020  
     
  RESOLVED: Partial small bowel obstruction ([facility] ICD-10-CM: K56.600 ICD-9-CM: 560.9   1/5/2019 - 5/29/2020  
     
  RESOLVED: Dyspnea ICD-10-CM: R06.00 
ICD-9-CM: 786.09   11/13/2018 - 5/29/2020  
     
  RESOLVED: ARF (acute renal failure) (HCC) ICD-10-CM: N17.9 ICD-9-CM: 192. 9   11/13/2018 - 5/29/2020  
     
  RESOLVED: Closed wedge compression fracture of T7 vertebra (HCC) ICD-10-CM: L77.603J ICD-9-CM: 139. 2   11/13/2018 - 5/29/2020  
     
  RESOLVED: Type 2 diabetes with nephropathy ([facility] ICD-10-CM: E11.21 
ICD-9-CM: 250.40, 583.81   10/1/2018 - 11/2/2020  
     
  RESOLVED: Chest pain ICD-10-CM: R07.9 ICD-9-CM: 786.50   6/27/2018 - 5/29/2020  
     
  RESOLVED: Abdominal pain ICD-10-CM: R10.9 ICD-9-CM: 789.00   6/27/2018 - 5/29/2020  
     
  RESOLVED: Coronary artery disease involving native coronary artery without angina pectoris (Chronic) ICD-10-CM: I25.10 ICD-9-CM: 414.01   1/22/2016 - 11/2/2020  
     
  RESOLVED: HTN (hypertension) ICD-10-CM: I10 
ICD-9-CM: 567. 9   10/9/2015 - 1/22/2016  
     
  RESOLVED: NSTEMI (non-ST elevated myocardial infarction) ([facility] ICD-10-CM: I21.4 ICD-9-CM: 410.70   10/9/2015 - 5/29/2020  
     
   
  
Subjective:  
 66 y. o. female with past medical history of chronic hypoxic respiratory failure (on 3 liters home oxygen), asthma, atrial fibrillation (Afib), autoimmune disease, fibromyalgia, CAD, heart failure (HFpEF), T7 vertebral compression fracture, COPD, type 2 DM, DVT, GERD, hypertension, NSTEMI, PAD, sleep apnea, obesity presented to the ED from home with chief complaints of shortness of breath (SOB) and low hemoglobin.  Patient has chronic SOB but worsened.  She was last hospitalized here from 1/1/2021 - 1/15/2021 with left lower lobe pneumonia, acute on chronic hypoxic respiratory failure, sepsis, GI bleed, anemia.  Patient was transfused with 2 units PRBCs with hemoglobin = 4.9 on 1/1/2021.  Last Hgb= 6.5 on 1/21/2021.  On arrival in the ED today, initial recorded vital signs were BP = 139/47, HR= 82, RR= 22, O2sat= 94% on 4 liters O2 nasal cannula (NC).  WBC = 18,400.  proBNP = 1,443.  Chest xray portable showed cardiomegaly, interstitial edema, and density in left lung base.  Patient is now seen for admission to the hospitalist service for continued evaluation and treatments.  There were no reports of new onset syncope, loss of consciousness, headache, neck pain, visual disturbance, numbness, paresthesias, focal weakness, chest pain, palpitations, abdominal pain, nausea, vomiting, diarrhea, melena, dysuria, hematuria, calf pain, increased leg swelling/ edema, fever, chills, rash, or known COVID 19 exposure. (Dr Gatito Oneal) 
  
1/27:  Feeling better with less shortness of breath. Hb low so needs another transfusion. No clear source of bleeding. Covid negative. GI to see.   
  
1/28:  Feels some better except sore throat - looks like thrush. Less shortness of breath. Hb up to 7.9 this AM after 1 unit PRC. K+ sl low - replacing. 
  
1/29:  Little change from yesterday. BP continues to be on the low side and pt reports feeling lightheaded with sitting up - Cards adjusting meds. Hb down to 7.3 - watching - will likely need another transfusion. WBC 19K - likely due to steroids - weaning.  
  
1/30:  Reports feeling better and no longer lightheaded. Cards has adjusted meds  Afeb. Will do 1-2 more days of IV antibiotics.   
1/31:  No new complaints this AM.  Cards has signed off. BP remains better. Hb at 7.0 - given her hx will transfuse today. Risks v benefits discussed.   
  
2/1:  No new complaints. She reports she feels a little stronger. AM labs pending.   
  
2/2:  Currently no complaints. Desats with even smallest amt of exercise and O2 placed back on 5 liters NC. Pul continues to see. Cards seeing again.   Hb 8.1 yesterday - AM Hb pending.   
  
2/3:  Resting comfortably in bed. No complaints. O2 down to 4 liters. CXR yesterday continued to show Pul Edema - more diuresis needed.   
  
:  No complaints. Feels less short of breath as long as she does not \"do too much. \"  Lasix IV given by Cards - pt reports good diuresis but I/O shows little out. Still on 4 liters - try to wean down to her usual 3 liters O2. AM labs pending. 
  
:  No complaints. O2 down to 3 liters. Stable for SNF rehab when they can take her.  
  
:  Feeling better and less short of breath. Screening Covid testing is pending for SNF rehab. 
  
:  Continues to feel better with less shortness of breath. Screening Covid pending for SNF rehab. 
  
:  Feels better with less shortness of breath. Screening Covid tests neg  and . Stable for SNF rehab. Labs should be followed closely at rehab. Follow up with PCP when out of rehab. Follow up with Cardiology as they direct. :  No complaints. Reports she continues to feel improved. Her A fib was more rapid last night last night and extra dose of Dig given -  rate ok now and no symptoms. No beds at SNF rehab yesterday - awaiting bed report today. 330PM:  Madeline Vasquez now has a bed and pt ready to go. Follow up instructions as noted above.   
  
Review of Systems: A comprehensive review of systems was negative except for that written in the HPI. 
  
Objective:  
Physical Exam:  
Visit Vitals BP (!) 99/55 (BP 1 Location: Right upper arm, BP Patient Position: Supine) Pulse 91 Temp 98.4 °F (36.9 °C) Resp 18 Ht 5' 7\" (1.702 m) Wt 115.2 kg (254 lb) SpO2 99% BMI 39.78 kg/m² O2 Flow Rate (L/min): 3 l/min O2 Device: Nasal cannula Temp (24hrs), Av.1 °F (36.7 °C), Min:97.8 °F (36.6 °C), Max:98.5 °F (36.9 °C) 
   07 -  1900 In: 240 [P.O.:240] Out: 1000 [Urine:1000]    1901 -  0700 In: 450 [P.O.:450] Out: 2100 [Urine:2100] General:  Alert, obese, cooperative, no distress, appears stated age. 21 Head:  Normocephalic, without obvious abnormality, atraumatic. Eyes:  Conjunctivae/corneas clear. EOMs intact. Throat: Lips, mucosa, and tongue moist; throat red with a few white patches on tongue. Neck: Supple, symmetrical, trachea midline, no adenopathy, thyroid: no enlargement/tenderness/nodules, no carotid bruit and no JVD. Lungs:   Clear to auscultation bilaterally. Chest wall:  No tenderness or deformity. Heart:  irreg with rate in 90s - 295, no murmur, click, rub or gallop. Abdomen:   Soft, non-tender. Bowel sounds normal. No masses,  No organomegaly. Extremities: no cyanosis. 1+ LE edema on top of chronic stasis changes. No calf tenderness or cords. Pulses: 2+ and symmetric all extremities. Skin:  turgor normal. No rashes Neurologic:   Alert and oriented X 3.  equal.  No cogwheeling or rigidity. Gait not tested at this time. Sensation grossly normal to touch. Gross motor of extremities normal except generalized weakness.     
  
Data Review:  
ECHO 1/29/21 Interpretation Summary Result status: Final result · Pericardium: There is a moderate left pleural effusion. · LV: Estimated LVEF is 60 - 65%. Normal cavity size, wall thickness and systolic function (ejection fraction normal). · TV: Mild tricuspid valve regurgitation is present.  
  
Portable chest single view dated 2/2/2021 Comparison chest dated 1/30/2021 History is increasing O2 needs A single portable AP upright view of the chest was obtained. The cardiac 
silhouette continues to be enlarged. There continues be opacification of the 
left lung base and a few air bronchograms are noted in the left hilar/parahilar 
region. This is suggestive of developing atelectasis/infiltration. There is some 
atelectatic change in the mid/upper portion of the left lung. Some hazy density 
is associated with the right lung. This is suggestive of developing 
infiltration. There is blunting of both costophrenic angles.  This is compatible 
with the presence of bilateral pleural effusions. IMPRESSION 1. Continued evidence of mild pulmonary edema. 2. Presence of hazy density involving both lungs suggestive of developing 
infiltration/atelectatic change.  
3. Presence of bilateral pleural effusions 
  
Signed: 
Ferny Shelton MD 
2/9/2021 
3:31 PM

## 2023-04-23 NOTE — DISCHARGE NOTE PROVIDER - NSDCMRMEDTOKEN_GEN_ALL_CORE_FT
Carafate 1 g/10 mL oral suspension: 10 milliliter(s) orally 3 times a day as needed for abdominal pain  famotidine 20 mg oral tablet: 1 tab(s) orally 2 times a day  omeprazole 20 mg oral delayed release capsule: 1 cap(s) orally once a day  ondansetron 4 mg oral disintegrating strip: 1 each orally 3 times a day as needed for nausea.   ondansetron 4 mg oral tablet, disintegratin tab(s) orally every 8 hours as needed for  nausea  Pepcid AC Maximum Strength 20 mg oral tablet: 1 tab(s) orally 2 times a day    omeprazole 20 mg oral delayed release capsule: 1 cap(s) orally once a day

## 2023-04-23 NOTE — CONSULT NOTE PEDS - SUBJECTIVE AND OBJECTIVE BOX
17y    No Known Allergies      Gastroesophageal reflux disease without esophagitis    No pertinent family history in first degree relatives    Handoff    MEWS Score    PEWS Score    No pertinent past medical history    GERD (gastroesophageal reflux disease)    GERD (gastroesophageal reflux disease)    No significant past surgical history    No significant past surgical history    S/P cholecystectomy    SOB    0    Intractable pain    SysAdmin_VstLnk            acetaminophen     Tablet .. 650 milliGRAM(s) Oral every 6 hours PRN  dextrose 5% + sodium chloride 0.9%. 1000 milliLiter(s) IV Continuous <Continuous>  pantoprazole  Injectable 40 milliGRAM(s) IV Push every 24 hours      FAMILY HISTORY:      HPI:  ALEJANDRA GARCES    18yo F w/ history of GERD s/p laparoscopic cholecystectomy in Feb 2023 presenting w/ abdominal pain. Patient notes that she has been having epigastric abdominal pain which has been acutely worsening in the past week. Patient states that pain is intermittent, sharp, with radiation to the chest, rated at 7/10 at its worst. Pain is often associated with nausea however she denies any episodes of emesis. Reports having She states that pain is often triggered by eating meals with most recent meal being avocados. She states that in the past she has been prescribed Pepcid, Omeprazole and Sucralafate with minimal improvements. Patient has scheduled appointment with GI on 4/24. LMP was 3 weeks ago. Denies any recent illnesses, fevers, cough, congestion, constipation or diarrhea.    PMHx: GERD  PSHx: s/p cholecystectomy (Feb 2023)  Meds: Pepcid BID, Omeprazole, Sucralafate prior to meals  All: NKDA   FHx: Non-contributory  SHx:   HEADSSS  - Home: Lives at home with mother, father, brother, sister, no pets, no smoke exposure. 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  - Activities: Enjoys hanging out with friends  - Drugs: Has used marijuana in the past, no recent use since surgery  - Sexuality: Has been sexually active in the past using protection, not on any birth control  - Suicide/Depression: Denies any SI/HI, has good support systems at home  PMD: Has not established care with PMD  Vaccines: UTD    ED Course: CBCd, CMP, EKG, Famotidine x1, Sucralfate x1      I&O's Summary    23 Apr 2023 07:01  -  23 Apr 2023 11:37  --------------------------------------------------------  IN: 400 mL / OUT: 0 mL / NET: 400 mL    Drug Dosing Weight  Height (cm): 164 (23 Apr 2023 06:50)  Weight (kg): 154.5 (23 Apr 2023 06:50)  BMI (kg/m2): 57.4 (23 Apr 2023 06:50)  BSA (m2): 2.47 (23 Apr 2023 06:50)    Medications:  MEDICATIONS  (STANDING):  dextrose 5% + sodium chloride 0.9%. 1000 milliLiter(s) (100 mL/Hr) IV Continuous <Continuous>  pantoprazole  Injectable 40 milliGRAM(s) IV Push every 24 hours    MEDICATIONS  (PRN):  acetaminophen     Tablet .. 650 milliGRAM(s) Oral every 6 hours PRN Temp greater or equal to 38C (100.4F), Mild Pain (1 - 3)   (23 Apr 2023 11:21    :

## 2023-04-23 NOTE — H&P PEDIATRIC - HISTORY OF PRESENT ILLNESS
ALEJANDRA GARCES    HPI.     PMHx: GERD  PSHx: s/p cholecystectomy (Feb 2023)  Meds: Pepcid BID, Omeprazole, Sucralafate prior to meals  All: NKDA   FHx: Non-contributory  SHx:   HEADSSS  - Home: Lives at home with mother, father, brother, sister, no pets, no smoke exposure. 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 1000 milliLiter(s) (100 mL/Hr) IV Continuous <Continuous>  pantoprazole  Injectable 40 milliGRAM(s) IV Push every 24 hours    MEDICATIONS  (PRN):  acetaminophen     Tablet .. 650 milliGRAM(s) Oral every 6 hours PRN Temp greater or equal to 38C (100.4F), Mild Pain (1 - 3)               ALEJANDRA GARCES    16yo F w/ history of GERD,    PMHx: GERD  PSHx: s/p cholecystectomy (Feb 2023)  Meds: Pepcid BID, Omeprazole, Sucralafate prior to meals  All: NKDA   FHx: Non-contributory  SHx:   HEADSSS  - Home: Lives at home with mother, father, brother, sister, no pets, no smoke exposure. 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  - Activities: Enjoys hanging out with friends  - Drugs: Has used marijuana in the past, no recent use since surgery  - Sexuality: Has been sexually active in the past using protection, not on any birth control  - Suicide/Depression: Denies any SI/HI, has good support systems at home  PMD: Has not established care with PMD  Vaccines: UTD    ED Course: CBCd, CMP,       I&O's Summary    23 Apr 2023 07:01  -  23 Apr 2023 11:37  --------------------------------------------------------  IN: 400 mL / OUT: 0 mL / NET: 400 mL        Drug Dosing Weight  Height (cm): 164 (23 Apr 2023 06:50)  Weight (kg): 154.5 (23 Apr 2023 06:50)  BMI (kg/m2): 57.4 (23 Apr 2023 06:50)  BSA (m2): 2.47 (23 Apr 2023 06:50)      Medications:  MEDICATIONS  (STANDING):  dextrose 5% + sodium chloride 0.9%. 1000 milliLiter(s) (100 mL/Hr) IV Continuous <Continuous>  pantoprazole  Injectable 40 milliGRAM(s) IV Push every 24 hours    MEDICATIONS  (PRN):  acetaminophen     Tablet .. 650 milliGRAM(s) Oral every 6 hours PRN Temp greater or equal to 38C (100.4F), Mild Pain (1 - 3)               ALEJANDRA GARCES    18yo F w/ history of GERD s/p laparoscopic cholecystectomy in Feb 2023 presenting w/ abdominal pain. Patient notes that she has been having epigastric abdominal pain which has been acutely worsening in the past week. Patient states that pain is intermittent, sharp, with radiation to the chest, rated at 7/10 at its worst. Pain is often associated with nausea however she denies any episodes of emesis. Reports having She states that pain is often triggered by eating meals with most recent meal being avocados. She states that in the past she has been prescribed Pepcid, Omeprazole and Sucralafate with minimal improvements. Patient has scheduled appointment with GI on 4/24. LMP was 3 weeks ago. Denies any recent illnesses, fevers, cough, congestion, constipation or diarrhea.    PMHx: GERD  PSHx: s/p cholecystectomy (Feb 2023)  Meds: Pepcid BID, Omeprazole, Sucralafate prior to meals  All: NKDA   FHx: Non-contributory  SHx:   HEADSSS  - Home: Lives at home with mother, father, brother, sister, no pets, no smoke exposure. 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  - Activities: Enjoys hanging out with friends  - Drugs: Has used marijuana in the past, no recent use since surgery  - Sexuality: Has been sexually active in the past using protection, not on any birth control  - Suicide/Depression: Denies any SI/HI, has good support systems at home  PMD: Has not established care with PMD  Vaccines: UTD    ED Course: CBCd, CMP, EKG, Famotidine x1, Sucralfate x1      I&O's Summary    23 Apr 2023 07:01  -  23 Apr 2023 11:37  --------------------------------------------------------  IN: 400 mL / OUT: 0 mL / NET: 400 mL    Drug Dosing Weight  Height (cm): 164 (23 Apr 2023 06:50)  Weight (kg): 154.5 (23 Apr 2023 06:50)  BMI (kg/m2): 57.4 (23 Apr 2023 06:50)  BSA (m2): 2.47 (23 Apr 2023 06:50)    Medications:  MEDICATIONS  (STANDING):  dextrose 5% + sodium chloride 0.9%. 1000 milliLiter(s) (100 mL/Hr) IV Continuous <Continuous>  pantoprazole  Injectable 40 milliGRAM(s) IV Push every 24 hours    MEDICATIONS  (PRN):  acetaminophen     Tablet .. 650 milliGRAM(s) Oral every 6 hours PRN Temp greater or equal to 38C (100.4F), Mild Pain (1 - 3)   ALEJANDRA GARCES    18yo F w/ history of GERD s/p laparoscopic cholecystectomy in Feb 2023 presenting w/ abdominal pain. Patient notes that she has been having epigastric abdominal pain which has been acutely worsening in the past week. Patient states that pain is intermittent, sharp, with radiation to the chest, rated at 7/10 at its worst. Pain is often associated with nausea however she denies any episodes of emesis. Reports having She states that pain is often triggered by eating meals with most recent meal being avocados. She states that in the past she has been prescribed Pepcid, Omeprazole and Sucralfate with minimal improvements. Patient has scheduled appointment with GI on 4/24. LMP was 3 weeks ago. Denies any recent illnesses, fevers, cough, congestion, constipation or diarrhea.    PMHx: GERD  PSHx: s/p cholecystectomy (Feb 2023)  Meds: Pepcid BID, Omeprazole, Sucralafate prior to meals  All: NKDA   FHx: Non-contributory  SHx:   HEADSSS  - Home: Lives at home with mother, father, brother, sister, no pets, no smoke exposure. 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  - Activities: Enjoys hanging out with friends  - Drugs: Has used marijuana in the past, no recent use since surgery  - Sexuality: Has been sexually active in the past using protection, not on any birth control  - Suicide/Depression: Denies any SI/HI, has good support systems at home  PMD: Has not established care with PMD  Vaccines: UTD    ED Course: CBCd, CMP, EKG, Famotidine x1, Sucralfate x1      I&O's Summary    23 Apr 2023 07:01  -  23 Apr 2023 11:37  --------------------------------------------------------  IN: 400 mL / OUT: 0 mL / NET: 400 mL    Drug Dosing Weight  Height (cm): 164 (23 Apr 2023 06:50)  Weight (kg): 154.5 (23 Apr 2023 06:50)  BMI (kg/m2): 57.4 (23 Apr 2023 06:50)  BSA (m2): 2.47 (23 Apr 2023 06:50)    Medications:  MEDICATIONS  (STANDING):  dextrose 5% + sodium chloride 0.9%. 1000 milliLiter(s) (100 mL/Hr) IV Continuous <Continuous>  pantoprazole  Injectable 40 milliGRAM(s) IV Push every 24 hours    MEDICATIONS  (PRN):  acetaminophen     Tablet .. 650 milliGRAM(s) Oral every 6 hours PRN Temp greater or equal to 38C (100.4F), Mild Pain (1 - 3)

## 2023-04-23 NOTE — DISCHARGE NOTE PROVIDER - NSDCCPCAREPLAN_GEN_ALL_CORE_FT
PRINCIPAL DISCHARGE DIAGNOSIS  Diagnosis: GERD (gastroesophageal reflux disease)  Assessment and Plan of Treatment: F/u cardiology in 1 week  F/u pediatrician in 1-3 days  F/u gastroenterologist in __      SECONDARY DISCHARGE DIAGNOSES  Diagnosis: Intractable pain  Assessment and Plan of Treatment:      PRINCIPAL DISCHARGE DIAGNOSIS  Diagnosis: GERD (gastroesophageal reflux disease)  Assessment and Plan of Treatment: Plan:  - Establish care with [facility] clinic, Dr. Riddle. Appt made for 4/28/23 @3pm.  - Follow up with GI, Dr. Johnston, in 2-3 weeks   - Follow up with Cardiology, Dr. Ceballos in 1 week  - Medication Instructions  > Continue omeprazole 20 mg orally once daily  Contact a health care provider if your child:  Has new symptoms.  Does not improve with treatment or has symptoms that get worse.  Has weight loss or poor weight gain.  Has difficult or painful swallowing.  Has a decreased appetite or refuses to eat.  Has diarrhea.  Has constipation.  Develops new breathing problems, such as hoarseness, wheezing, or a chronic cough.  Get help right away if your child:  Has pain in his or her arms, neck, jaw, teeth, or back.  Has pain that gets worse or lasts longer.  Develops nausea, vomiting, or sweating.  Develops shortness of breath.  Faints.  Vomits and the vomit is green, yellow, or black, or it looks like blood or coffee grounds.  Has stool that is red, bloody, or black.  These symptoms may represent a serious problem that is an emergency. Do not wait to see if the symptoms will go away. Get medical help right away. Call your local emergency services (911 in the [place]      SECONDARY DISCHARGE DIAGNOSES  Diagnosis: Intractable pain  Assessment and Plan of Treatment:      PRINCIPAL DISCHARGE DIAGNOSIS  Diagnosis: GERD (gastroesophageal reflux disease)  Assessment and Plan of Treatment: Plan:  - Establish care with Adolescent Medicine clinic, Dr. Riddle. Appt made for 4/28/23 @3pm.  - Follow up with GI, Dr. Johnston, in 2-3 weeks   - Follow up with Cardiology, Dr. Ceballos in 1 week  - Medication Instructions  > Continue omeprazole 20 mg orally once daily   Contact a health care provider if your child:  Has new symptoms.  Does not improve with treatment or has symptoms that get worse.  Has weight loss or poor weight gain.  Has difficult or painful swallowing.  Has a decreased appetite or refuses to eat.  Has diarrhea.  Has constipation.  Develops new breathing problems, such as hoarseness, wheezing, or a chronic cough.  Get help right away if your child:  Has pain in his or her arms, neck, jaw, teeth, or back.  Has pain that gets worse or lasts longer.  Develops nausea, vomiting, or sweating.  Develops shortness of breath.  Faints.  Vomits and the vomit is green, yellow, or black, or it looks like blood or coffee grounds.  Has stool that is red, bloody, or black.  These symptoms may represent a serious problem that is an emergency. Do not wait to see if the symptoms will go away. Get medical help right away. Call your local emergency services (911 in the [place]      SECONDARY DISCHARGE DIAGNOSES  Diagnosis: Intractable pain  Assessment and Plan of Treatment:

## 2023-04-23 NOTE — DISCHARGE NOTE PROVIDER - CARE PROVIDER_API CALL
Daquan Riddle (DO)  Adolescent Medicine; Pediatrics  [address] [facility], [address] [place] [address]  Phone: [phone]  Fax: [phone]  Follow Up Time: 1-3 days    Mirtha Ceballos)  Pediatrics  [facility], [address] [place] [address]  Phone: [phone]  Fax: [phone]  Follow Up Time: 1 week   Daquan Riddle (DO)  Adolescent Medicine; Pediatrics  [address] [facility], [address] [place] [address]  Phone: [phone]  Fax: [phone]  Scheduled Appointment: 04/28/2023 03:00 PM    Mirtha Ceballos)  Pediatrics  [place] [address]  Phone: [phone]  Fax: [phone]  Follow Up Time: 1 week    Nya Johnston)  Pediatric Gastroenterology  [facility], [address] [place] [address]  Phone: [phone]  Fax: [phone]  Follow Up Time: 2 weeks

## 2023-04-23 NOTE — ED PROVIDER NOTE - CLINICAL SUMMARY MEDICAL DECISION MAKING FREE TEXT BOX
17-year-old female past medical history cholecystectomy 2 months ago, GERD, presents to the emergency room for GERD and intractable epigastric abdominal pain for the past month, worsening over the past few days.  Patient reports she is taking Pepcid twice a day with improvement then symptoms returned with 10 out of 10 pain.  Patient reports nausea but no vomiting.  No fevers.  Patient was seen in the emergency department multiple times for same complaint, with unremarkable CT imaging yesterday.  Patient reports every time she goes to lay down to go to sleep at night the pain returns. EKG, labs and imaging obtained and personally reviewed.  Appropriate medications for patient's presenting complaints were ordered and effects were reassessed.  Patient's records (prior hospital, ED visit) were reviewed.  Additional history was obtained from family.  Escalation to admission/observation was considered. Patient requires inpatient hospitalization - pain uncontrolled and requires further workup. Patient updated at the bedside.

## 2023-04-23 NOTE — DISCHARGE NOTE PROVIDER - HOSPITAL COURSE
18yo F w/ history of GERD s/p lap cholecystectomy in Feb 2023 p/w worsening abdominal pain is admitted for intractable pain management and GI workup.    ED Course: CBCd, CMP, EKG, Famotidine x1, Sucralfate x1    Inpatient Course (____-____):   Pt was admitted to the inpatient floor. Vitals and clinical status stable on discharge.   RESP: Maintained on room air  CVS: Hemodynamically stable throughout stay  FEN/GI: Tolerated a regular pediatric diet  ID: RVP/COVID (_)    Labs and Radiology:        Discharge Vitals & PE:  Discharge Vitals:      Discharge Physical Exam:       Plan:  - Follow up with pediatrician in 1-3 days  - Medication Instructions  >     18yo F w/ history of GERD s/p lap cholecystectomy in Feb 2023 p/w worsening abdominal pain is admitted for intractable pain management and GI workup.    ED Course: CBCd, CMP, EKG, Famotidine x1, Sucralfate x1    Inpatient Course (4/23/23-____):   Pt was admitted to the inpatient floor. Vitals and clinical status stable on discharge.   RESP: Maintained on room air  CVS: Hemodynamically stable throughout stay. ECG showed AV block, type 1. Cardiology recommends outpatient follow up in 1 week.  FEN/GI: Tolerated a regular pediatric diet with maintenance fluids. Received Pantoprazole and 1 dose of Famotidine for GI upset. Miralax added. US of RUQ was normal. GI was consulted and recommended scope on 4/24. Scope showed _____. Recommends ____    Labs and Radiology:  US Abdomen Upper Quadrant Right (04.23.23 @ 14:02)  IMPRESSION: No sonographic evidence of acute pathology.    Discharge Vitals & PE:  Discharge Vitals:      Discharge Physical Exam:   General: well-appearing, wake, alert  HEENT: NCAT, EOMI, no scleral icterus, MMM, TMs clear b/l  Lung: CTABL, upper airway congestion noted, no stridor at this time, no tachypnea, retractions, nasal flaring  Heart: RRR, +S1/S2, No m/r/g  Abdomen: soft, NT/ND, +BS  Extremities: 2+ peripheral pulses, <2 sec cap refill, no cyanosis or edema  Skin: no rashes or lesions    Plan:  - Establish care with Adolescent Medicine clinic in 1-3 days. Information provided.  - Follow up with GI, Dr. Johnston, in _____  - Follow up with Cardiology, _____, in 1 week  - Medication Instructions  > Continue ____     16yo F w/ history of GERD s/p lap cholecystectomy in Feb 2023 p/w worsening abdominal pain is admitted for intractable pain management and GI workup.    ED Course: CBCd, CMP, EKG, Famotidine x1, Sucralfate x1    Inpatient Course (4/23/23-____):   Pt was admitted to the inpatient floor. Vitals and clinical status stable on discharge.   RESP: Maintained stable on room air  CVS: Hemodynamically stable throughout stay. ECG showed AV block, type 1. Cardiology recommends outpatient follow up in 1 week.  FEN/GI: Tolerated a regular pediatric diet with maintenance fluids. Received Pantoprazole and 1 dose of Famotidine for GI upset. Miralax added. Carafate given as needed. US of RUQ was normal. GI was consulted and recommended scope on 4/24. Scope showed _____. Recommends ____    Labs and Radiology:  US Abdomen Upper Quadrant Right (04.23.23 @ 14:02)  IMPRESSION: No sonographic evidence of acute pathology.    Discharge Vitals & PE:  Discharge Vitals:      Discharge Physical Exam:   General: well-appearing, wake, alert  HEENT: NCAT, EOMI, no scleral icterus, MMM, TMs clear b/l  Lung: CTABL, upper airway congestion noted, no stridor at this time, no tachypnea, retractions, nasal flaring  Heart: RRR, +S1/S2, No m/r/g  Abdomen: soft, NT/ND, +BS  Extremities: 2+ peripheral pulses, <2 sec cap refill, no cyanosis or edema  Skin: no rashes or lesions    Plan:  - Establish care with Adolescent Medicine clinic in 1-3 days. Information provided.  - Follow up with GI, Dr. Johnston, in _____  - Follow up with Cardiology, _____, in 1 week  - Medication Instructions  > Continue ____     18yo F w/ history of GERD s/p lap cholecystectomy in Feb 2023 p/w worsening abdominal pain is admitted for intractable pain management and GI workup.    ED Course: CBCd, CMP, EKG, Famotidine x1, Sucralfate x1    Inpatient Course (4/23/23-4/24/23):   Pt was admitted to the inpatient floor. Vitals and clinical status stable on discharge.   RESP: Maintained stable on room air  CVS: Hemodynamically stable throughout stay. ECG showed AV block, type 1. Cardiology recommends outpatient follow up in 1 week.  FEN/GI: Tolerated a regular pediatric diet with maintenance fluids. Received Pantoprazole and 1 dose of Famotidine for GI upset. Miralax added. Carafate given as needed. US of RUQ was normal. GI was consulted and recommended scope on 4/24. Scope showed normal gastric mucosa. Biopsy results pending. Recommends omeprazole 20 mg once daily, outpatient GI follow up and low acid/low spicy diet.     Labs and Radiology:  US Abdomen Upper Quadrant Right (04.23.23 @ 14:02)  IMPRESSION: No sonographic evidence of acute pathology.    Discharge Vitals & PE:  Discharge Vitals:  Vital Signs Last 24 Hrs  T(C): 37.1 (24 Apr 2023 07:58), Max: 37.2 (23 Apr 2023 23:42)  T(F): 98.8 (24 Apr 2023 07:58), Max: 98.9 (23 Apr 2023 23:42)  HR: 62 (24 Apr 2023 09:00) (62 - 75)  BP: 139/61 (24 Apr 2023 09:00) (87/45 - 139/61)  BP(mean): 79 (24 Apr 2023 07:52) (62 - 79)  RR: 18 (24 Apr 2023 09:00) (18 - 22)  SpO2: 97% (24 Apr 2023 09:00) (97% - 100%)    Parameters below as of 24 Apr 2023 09:00  Patient On (Oxygen Delivery Method): room air          Discharge Physical Exam:   General: well-appearing, wake, alert  HEENT: NCAT, EOMI, no scleral icterus, MMM, TMs clear b/l  Lung: CTABL, upper airway congestion noted, no stridor at this time, no tachypnea, retractions, nasal flaring  Heart: RRR, +S1/S2, No m/r/g  Abdomen: soft, NT/ND, +BS  Extremities: 2+ peripheral pulses, <2 sec cap refill, no cyanosis or edema  Skin: no rashes or lesions    Plan:  - Establish care with Adolescent Medicine clinic in 1-3 days. Information provided.  - Follow up with GI, Dr. Johnston, in 2-3 weeks   - Follow up with Cardiology, Dr. Ann in 1 week  - Medication Instructions  > Continue omeprazole 20 mg once daily      16yo F w/ history of GERD s/p lap cholecystectomy in Feb 2023 p/w worsening abdominal pain is admitted for intractable pain management and GI workup.    ED Course: CBCd, CMP, EKG, Famotidine x1, Sucralfate x1    Inpatient Course (4/23/23-4/24/23):   Pt was admitted to the inpatient floor. Vitals and clinical status stable on discharge.   RESP: Maintained stable on room air  CVS: Hemodynamically stable throughout stay. ECG showed AV block, type 1. Cardiology recommends outpatient follow up in 1 week.  FEN/GI: Tolerated a low fat pediatric diet with maintenance fluids. Received Pantoprazole and 1 dose of Famotidine for GI upset. Miralax added. Carafate given as needed. US of RUQ was normal. GI was consulted and recommended scope on 4/24. Scope showed normal gastric mucosa. Biopsy results pending. Recommends omeprazole 20 mg once daily, outpatient GI follow up in 2-3 weeks and low acid/low spicy diet.     Labs and Radiology:  US Abdomen Upper Quadrant Right (04.23.23 @ 14:02)  IMPRESSION: No sonographic evidence of acute pathology.    Lipase, Serum (04.23.23 @ 04:30) 14 U/L  Lactate, Blood (04.23.23 @ 04:30) 0.9 mmol/L    Complete Blood Count + Automated Diff (04.23.23 @ 04:30)    WBC Count: 10.01 K/uL   RBC Count: 4.84 M/uL   Hemoglobin: 12.4 g/dL   Hematocrit: 38.3 %   Mean Cell Volume: 79.1 fL   Mean Cell Hemoglobin: 25.6 pg   Mean Cell Hemoglobin Conc: 32.4 g/dL   Red Cell Distrib Width: 13.6 %   Platelet Count - Automated: 277 K/uL   MPV: 11.8 fL   Auto Neutrophil #: 6.19 K/uL   Auto Lymphocyte #: 2.90 K/uL   Auto Monocyte #: 0.79 K/uL   Auto Eosinophil #: 0.07 K/uL   Auto Basophil #: 0.02 K/uL   Auto Neutrophil %: 61.8: Differential percentages must be correlated with absolute numbers for  clinical significance. %   Auto Lymphocyte %: 29.0 %   Auto Monocyte %: 7.9 %   Auto Eosinophil %: 0.7 %   Auto Basophil %: 0.2 %   Auto Immature Granulocyte %: 0.4: (Includes meta, myelo and promyelocytes). Mild elevations in immature  granulocytes may be seen with many inflammatory processes and pregnancy;  clinical correlation suggested. %   Nucleated RBC: 0 /100 WBCs      Discharge Vitals & PE:  T(C): 37.1 (24 Apr 2023 11:25), Max: 37.2 (23 Apr 2023 23:42)  T(F): 98.7 (24 Apr 2023 11:25), Max: 98.9 (23 Apr 2023 23:42)  HR: 83 (24 Apr 2023 11:25) (62 - 83)  BP: 123/56 (24 Apr 2023 11:25) (87/45 - 139/61)  BP(mean): 79 (24 Apr 2023 07:52) (62 - 79)  RR: 20 (24 Apr 2023 11:25) (18 - 22)  SpO2: 98% (24 Apr 2023 11:25) (97% - 100%)  Patient On (Oxygen Delivery Method): room air    Discharge Physical Exam:   General: well-appearing, wake, alert  HEENT: NCAT, EOMI, no scleral icterus, MMM, TMs clear b/l  Lung: CTABL, upper airway congestion noted, no stridor at this time, no tachypnea, retractions, nasal flaring  Heart: RRR, +S1/S2, No m/r/g  Abdomen: soft, NTND, +BS,   Extremities: 2+ peripheral pulses, <2 sec cap refill, no cyanosis or edema  Skin: no rashes or lesions    Plan:  - Establish care with Adolescent Medicine clinic, Dr. Riddle. Appt made for 4/28/23 @3pm.  - Follow up with GI, Dr. Johnston, in 2-3 weeks   - Follow up with Cardiology, Dr. Ceballos in 1 week  - Medication Instructions  > Continue omeprazole 20 mg orally once daily      16yo F w/ history of GERD s/p lap cholecystectomy in Feb 2023 p/w worsening abdominal pain is admitted for intractable pain management and GI workup.    ED Course: CBCd, CMP, EKG, Famotidine x1, Sucralfate x1    Inpatient Course (4/23/23-4/24/23):   Pt was admitted to the inpatient floor. Vitals and clinical status stable on discharge.   RESP: Maintained stable on room air  CVS: Hemodynamically stable throughout stay. ECG showed AV block, type 1. Cardiology recommends outpatient follow up in 1 week.  FEN/GI: Tolerated a low fat pediatric diet with maintenance fluids. Received Pantoprazole and 1 dose of Famotidine for GI upset. Miralax added. Carafate given as needed. US of RUQ was normal. GI was consulted and recommended scope on 4/24. Scope showed normal gastric mucosa. Biopsy results pending. Recommends omeprazole 20 mg once daily, outpatient GI follow up in 2-3 weeks and low acid/low spicy diet.     Labs and Radiology:  US Abdomen Upper Quadrant Right (04.23.23 @ 14:02)  IMPRESSION: No sonographic evidence of acute pathology.    Lipase, Serum (04.23.23 @ 04:30) 14 U/L  Lactate, Blood (04.23.23 @ 04:30) 0.9 mmol/L    Complete Blood Count + Automated Diff (04.23.23 @ 04:30)    WBC Count: 10.01 K/uL   RBC Count: 4.84 M/uL   Hemoglobin: 12.4 g/dL   Hematocrit: 38.3 %   Mean Cell Volume: 79.1 fL   Mean Cell Hemoglobin: 25.6 pg   Mean Cell Hemoglobin Conc: 32.4 g/dL   Red Cell Distrib Width: 13.6 %   Platelet Count - Automated: 277 K/uL   MPV: 11.8 fL   Auto Neutrophil #: 6.19 K/uL   Auto Lymphocyte #: 2.90 K/uL   Auto Monocyte #: 0.79 K/uL   Auto Eosinophil #: 0.07 K/uL   Auto Basophil #: 0.02 K/uL   Auto Neutrophil %: 61.8: Differential percentages must be correlated with absolute numbers for  clinical significance. %   Auto Lymphocyte %: 29.0 %   Auto Monocyte %: 7.9 %   Auto Eosinophil %: 0.7 %   Auto Basophil %: 0.2 %   Auto Immature Granulocyte %: 0.4: (Includes meta, myelo and promyelocytes). Mild elevations in immature  granulocytes may be seen with many inflammatory processes and pregnancy;  clinical correlation suggested. %   Nucleated RBC: 0 /100 WBCs      Discharge Vitals & PE:  T(C): 37 (24 Apr 2023 15:45), Max: 37.2 (23 Apr 2023 23:42)  T(F): 98.6 (24 Apr 2023 15:45), Max: 98.9 (23 Apr 2023 23:42)  HR: 93 (24 Apr 2023 15:45) (62 - 93)  BP: 100/52 (24 Apr 2023 15:45) (98/43 - 139/61)  BP(mean): 72 (24 Apr 2023 15:45) (65 - 79)  RR: 20 (24 Apr 2023 15:45) (18 - 22)  SpO2: 97% (24 Apr 2023 15:45) (97% - 100%)  Patient On (Oxygen Delivery Method): room air    Discharge Physical Exam:   General: well-appearing, wake, alert  HEENT: NCAT, EOMI, no scleral icterus, MMM, TMs clear b/l  Lung: CTABL, upper airway congestion noted, no stridor at this time, no tachypnea, retractions, nasal flaring  Heart: RRR, +S1/S2, No m/r/g  Abdomen: soft, NTND, +BS,   Extremities: 2+ peripheral pulses, <2 sec cap refill, no cyanosis or edema  Skin: no rashes or lesions    Plan:  - Establish care with Adolescent Medicine clinic, Dr. Riddle. Appt made for 4/28/23 @3pm.  - Follow up with GI, Dr. Johnston, in 2-3 weeks   - Follow up with Cardiology, Dr. Ceballos in 1 week  - Medication Instructions  > Continue omeprazole 20 mg orally once daily

## 2023-04-23 NOTE — H&P PEDIATRIC - NSHPPHYSICALEXAM_GEN_ALL_CORE
Vital Signs Last 24 Hrs  T(C): 36.8 (23 Apr 2023 09:07), Max: 37.5 (22 Apr 2023 11:58)  T(F): 98.2 (23 Apr 2023 09:07), Max: 99.5 (22 Apr 2023 11:58)  HR: 61 (23 Apr 2023 09:07) (61 - 85)  BP: 111/51 (23 Apr 2023 09:07) (98/57 - 132/64)  BP(mean): 77 (23 Apr 2023 09:07) (72 - 77)  RR: 20 (23 Apr 2023 09:07) (16 - 20)  SpO2: 100% (23 Apr 2023 09:07) (98% - 100%)    Parameters below as of 23 Apr 2023 06:50  Patient On (Oxygen Delivery Method): room air    Physical Exam:  GENERAL: well-appearing, well nourished, no acute distress, AOx3  HEENT: NCAT, conjunctiva clear and not injected, sclera non-icteric, PERRLA, EACs clear, TMs nonbulging/nonerythematous, nares patent, mucous membranes moist, no mucosal lesions, pharynx nonerythematous, no tonsillar hypertrophy or exudate, neck supple, no cervical lymphadenopathy  HEART: RRR, S1, S2, no rubs, murmurs, or gallops, RP/DP present, cap refill <2 seconds  LUNG: CTAB, no wheezing, no ronchi, no crackles, no retractions, no belly breathing, no tachypnea  ABDOMEN: +BS, soft, nontender, nondistended, no hepatomegaly, no splenomegaly, no hernia  NEURO/MSK: grossly intact  NEURO: CNII-XII grossly intact, EOMI, no dysmetria, DTRs normal b/l, no ataxia, sensation intact to light touch, negative Babinski  MUSCULOSKELETAL: passive and active ROM intact, 5/5 strength upper and lower extremities  SKIN: good turgor, no rash, no bruising or prominent lesions  BACK: spine normal without deformity or tenderness, no CVA tenderness  RECTAL: normal sphincter tone, no hemorrhoids or masses palpable  EXTREMITIES: No amputations or deformities, cyanosis, edema or varicosities, peripheral pulses intact  PSYCHIATRIC: Oriented X3, intact recent and remote memory, judgment and insight, normal mood and affect  FEMALE : Vagina without lesions or discharge. Cervix without lesions or discharge. Uterus and adnexa/parametria nontender without masses  BREAST: No nipple abnormality, dominant masses, tenderness to palpation, axillary or supraclavicular adenopathy  MALE : Penis circumcised without lesions, urethral meatus normal location without discharge, testes and epididymides normal size without masses, scrotum without lesions, cremasteric reflex present b/l Vital Signs Last 24 Hrs  T(C): 36.8 (23 Apr 2023 09:07), Max: 37.5 (22 Apr 2023 11:58)  T(F): 98.2 (23 Apr 2023 09:07), Max: 99.5 (22 Apr 2023 11:58)  HR: 61 (23 Apr 2023 09:07) (61 - 85)  BP: 111/51 (23 Apr 2023 09:07) (98/57 - 132/64)  BP(mean): 77 (23 Apr 2023 09:07) (72 - 77)  RR: 20 (23 Apr 2023 09:07) (16 - 20)  SpO2: 100% (23 Apr 2023 09:07) (98% - 100%)    Parameters below as of 23 Apr 2023 06:50  Patient On (Oxygen Delivery Method): room air    Physical Exam:  GENERAL: well-appearing, well nourished, obese body habitus  HEENT: NCAT, conjunctiva clear and not injected, sclera non-icteric, PERRLA, nares patent, mucous membranes moist, no mucosal lesions, pharynx nonerythematous, neck supple, no cervical lymphadenopathy  HEART: RRR, S1, S2, no rubs, murmurs, or gallops, RP present, cap refill <2 seconds  LUNG: CTAB, no wheezing, no crackles, no retractions, no belly breathing, no tachypnea  ABDOMEN: +BS, soft, TTP in epigastrium and RUQ, nondistended  NEURO/MSK: grossly intact  SKIN: good turgor, no rash, no bruising or prominent lesions  BACK: spine normal without deformity or tenderness, no CVA tenderness

## 2023-04-23 NOTE — CONSULT NOTE PEDS - ASSESSMENT
17 year old female with a PMH significant for laparoscopic cholecystectomy who presents with a 1 month history of chest and epigastric pain.  She has been evaluated in [facility] ED multiple times for her symptoms.  She has been treated with omeprazole, famotidine and sulcrafate without improvement in her symptoms. She has also been constipated for the past few weeks.  Blood has been noted in one of her stools. Abdominal US was within normal limits,  CBC, CMP and lipase were within normal limits  - low acid, low spicy diet  - EGD tomorrow in the AM  - NPO after midnight  -start Miralax 1 cap daily for constipation   - will follow

## 2023-04-24 ENCOUNTER — TRANSCRIPTION ENCOUNTER (OUTPATIENT)
Age: 18
End: 2023-04-24

## 2023-04-24 ENCOUNTER — RESULT REVIEW (OUTPATIENT)
Age: 18
End: 2023-04-24

## 2023-04-24 VITALS
TEMPERATURE: 99 F | DIASTOLIC BLOOD PRESSURE: 52 MMHG | HEART RATE: 93 BPM | SYSTOLIC BLOOD PRESSURE: 100 MMHG | RESPIRATION RATE: 20 BRPM | OXYGEN SATURATION: 97 %

## 2023-04-24 PROBLEM — K21.9 GASTRO-ESOPHAGEAL REFLUX DISEASE WITHOUT ESOPHAGITIS: Chronic | Status: ACTIVE | Noted: 2023-04-23

## 2023-04-24 PROCEDURE — 88312 SPECIAL STAINS GROUP 1: CPT | Mod: 26

## 2023-04-24 PROCEDURE — 99238 HOSP IP/OBS DSCHRG MGMT 30/<: CPT

## 2023-04-24 PROCEDURE — 88305 TISSUE EXAM BY PATHOLOGIST: CPT | Mod: 26

## 2023-04-24 PROCEDURE — 43239 EGD BIOPSY SINGLE/MULTIPLE: CPT

## 2023-04-24 RX ADMIN — Medication 650 MILLIGRAM(S): at 03:44

## 2023-04-24 RX ADMIN — SODIUM CHLORIDE 100 MILLILITER(S): 9 INJECTION, SOLUTION INTRAVENOUS at 06:04

## 2023-04-24 RX ADMIN — PANTOPRAZOLE SODIUM 40 MILLIGRAM(S): 20 TABLET, DELAYED RELEASE ORAL at 06:03

## 2023-04-24 RX ADMIN — SODIUM CHLORIDE 100 MILLILITER(S): 9 INJECTION, SOLUTION INTRAVENOUS at 01:42

## 2023-04-24 RX ADMIN — Medication 650 MILLIGRAM(S): at 04:15

## 2023-04-24 NOTE — DISCHARGE NOTE NURSING/CASE MANAGEMENT/SOCIAL WORK - NSDCPEFALRISK_GEN_ALL_CORE
For information on Fall & Injury Prevention, visit: [email or web address] OR  [email or web address] OR  https://www.cdc.gov/steadi/patient.html

## 2023-04-24 NOTE — PROGRESS NOTE PEDS - ASSESSMENT
17 year old obese female with chest pain and epigastric pain unresponsive to omeprazole, famotidine and sulcrafate admitted for further evaluation.  EGD preformed this morning appeared grossly normal  Biopsies were obtained and results are pending.  Need to consider her obesity as a contributing factor to the reflux symptoms.     - continue omeprazole as needed  - low acid/low spicy diet  - dietary consult to discuss dietary changes that would promote weight loss  - my ago home today and follow up as an outpatient

## 2023-04-24 NOTE — DISCHARGE NOTE NURSING/CASE MANAGEMENT/SOCIAL WORK - PATIENT PORTAL LINK FT
You can access the FollowMyHealth Patient Portal offered by [facility] by registering at the following website: [email or web address]. By joining [facility]’s FollowMyHealth portal, you will also be able to view your health information using other applications (apps) compatible with our system.

## 2023-04-24 NOTE — PROGRESS NOTE PEDS - SUBJECTIVE AND OBJECTIVE BOX
17y    Female    Patient is a 17y old  Female who presents with a chief complaint of Abdominal pain (23 Apr 2023 20:53)      VITALS:  Vital Signs Last 24 Hrs  T(C): 37.1 (24 Apr 2023 07:58), Max: 37.2 (23 Apr 2023 23:42)  T(F): 98.8 (24 Apr 2023 07:58), Max: 98.9 (23 Apr 2023 23:42)  HR: 65 (24 Apr 2023 07:58) (61 - 75)  BP: 110/67 (24 Apr 2023 07:58) (87/45 - 111/51)  BP(mean): 79 (24 Apr 2023 07:52) (62 - 79)  RR: 18 (24 Apr 2023 07:58) (18 - 22)  SpO2: 97% (24 Apr 2023 07:52) (97% - 100%)    Parameters below as of 23 Apr 2023 20:15  Patient On (Oxygen Delivery Method): room air        Physical Exam  Gen: NAD, obese  HEENT: NC/AT, Mucosal Membranes  Cardio: S1/S2 No S3/S4, Regular  Resp: CTA B/L  Abdomen: Soft, ND/NT  Neuro: AAOx3, Cranial Nerve II-XII intact   Extremities: FROM x 4    LABS:  CBC Full  -  ( 23 Apr 2023 04:30 )  WBC Count : 10.01 K/uL  RBC Count : 4.84 M/uL  Hemoglobin : 12.4 g/dL  Hematocrit : 38.3 %  Platelet Count - Automated : 277 K/uL  Mean Cell Volume : 79.1 fL  Mean Cell Hemoglobin : 25.6 pg  Mean Cell Hemoglobin Concentration : 32.4 g/dL  Auto Neutrophil # : 6.19 K/uL  Auto Lymphocyte # : 2.90 K/uL  Auto Monocyte # : 0.79 K/uL  Auto Eosinophil # : 0.07 K/uL  Auto Basophil # : 0.02 K/uL  Auto Neutrophil % : 61.8 %  Auto Lymphocyte % : 29.0 %  Auto Monocyte % : 7.9 %  Auto Eosinophil % : 0.7 %  Auto Basophil % : 0.2 %    04-23    140  |  104  |  7<L>  ----------------------------<  71  4.2   |  24  |  0.9    Ca    9.8      23 Apr 2023 04:30    TPro  7.2  /  Alb  4.2  /  TBili  0.4  /  DBili  <0.2  /  AST  13<L>  /  ALT  12<L>  /  AlkPhos  97  04-23    LIVER FUNCTIONS - ( 23 Apr 2023 04:30 )  Alb: 4.2 g/dL / Pro: 7.2 g/dL / ALK PHOS: 97 U/L / ALT: 12 U/L / AST: 13 U/L / GGT: x               MEDICATIONS:  MEDICATIONS  (STANDING):  dextrose 5% + sodium chloride 0.9%. 1000 milliLiter(s) (100 mL/Hr) IV Continuous <Continuous>  pantoprazole  Injectable 40 milliGRAM(s) IV Push every 24 hours  polyethylene glycol 3350 17 Gram(s) Oral at bedtime    MEDICATIONS  (PRN):  acetaminophen     Tablet .. 650 milliGRAM(s) Oral every 6 hours PRN Temp greater or equal to 38C (100.4F), Mild Pain (1 - 3)

## 2023-04-24 NOTE — CHART NOTE - NSCHARTNOTEFT_GEN_A_CORE
PACU ANESTHESIA ADMISSION NOTE      Procedure:   Post op diagnosis:      ____  Intubated  TV:______       Rate: ______      FiO2: ______    _x___  Patent Airway    _x___  Full return of protective reflexes    ____  Full recovery from anesthesia / back to baseline status    Vitals:P 74 R 16 T 98.2 /58 SpO2 98%  T(C): 37.1 (04-24-23 @ 07:58), Max: 37.2 (04-23-23 @ 23:42)  HR: 63 (04-24-23 @ 08:50) (61 - 75)  BP: 109/55 (04-24-23 @ 08:50) (87/45 - 111/54)  RR: 20 (04-24-23 @ 08:50) (18 - 22)  SpO2: 100% (04-24-23 @ 08:50) (97% - 100%)    Mental Status:  __x__ Awake   _x____ Alert   _____ Drowsy   _____ Sedated    Nausea/Vomiting:  _x___  NO       ______Yes,   See Post - Op Orders         Pain Scale (0-10):  __0___    Treatment: _x___ None    ____ See Post - Op/PCA Orders    Post - Operative Fluids:   __x__ Oral   ____ See Post - Op Orders  -------------as per surgeon    Plan: Discharge:   ____Home       __x___Floor     _____Critical Care    _____  Other:_________________    Comments:  No anesthesia issues or complications noted.  Discharge when criteria met.

## 2023-04-25 LAB — SURGICAL PATHOLOGY STUDY: SIGNIFICANT CHANGE UP

## 2023-04-27 ENCOUNTER — TRANSCRIPTION ENCOUNTER (OUTPATIENT)
Age: 18
End: 2023-04-27

## 2023-04-27 ENCOUNTER — APPOINTMENT (OUTPATIENT)
Dept: GASTROENTEROLOGY | Facility: CLINIC | Age: 18
End: 2023-04-27
Payer: MEDICAID

## 2023-04-27 DIAGNOSIS — K80.20 CALCULUS OF GALLBLADDER W/OUT CHOLECYSTITIS W/OUT OBSTRUCTION: ICD-10-CM

## 2023-04-27 DIAGNOSIS — K21.9 GASTRO-ESOPHAGEAL REFLUX DISEASE WITHOUT ESOPHAGITIS: ICD-10-CM

## 2023-04-27 DIAGNOSIS — K21.9 GASTRO-ESOPHAGEAL REFLUX DISEASE W/OUT ESOPHAGITIS: ICD-10-CM

## 2023-04-27 DIAGNOSIS — Z90.49 ACQUIRED ABSENCE OF OTHER SPECIFIED PARTS OF DIGESTIVE TRACT: ICD-10-CM

## 2023-04-27 DIAGNOSIS — R10.9 UNSPECIFIED ABDOMINAL PAIN: ICD-10-CM

## 2023-04-27 LAB
B-GALACTOSIDASE TISS-CCNT: 154.9 U/G — SIGNIFICANT CHANGE UP
DISACCHARIDASES TSMI-IMP: SIGNIFICANT CHANGE UP
ISOMALTASE TISS-CCNT: 13.5 U/G — SIGNIFICANT CHANGE UP
PALATINASE TISS-CCNT: 37 U/G — SIGNIFICANT CHANGE UP
SUCRASE TISS-CCNT: 10.1 U/G — SIGNIFICANT CHANGE UP

## 2023-04-27 PROCEDURE — 99214 OFFICE O/P EST MOD 30 MIN: CPT | Mod: 95

## 2023-04-27 RX ORDER — OMEPRAZOLE 20 MG/1
20 CAPSULE, DELAYED RELEASE ORAL DAILY
Qty: 60 | Refills: 1 | Status: ACTIVE | COMMUNITY
Start: 2023-04-27 | End: 1900-01-01

## 2023-04-28 ENCOUNTER — APPOINTMENT (OUTPATIENT)
Dept: PEDIATRIC ADOLESCENT MEDICINE | Facility: CLINIC | Age: 18
End: 2023-04-28

## 2023-04-28 PROBLEM — K80.20 SYMPTOMATIC CHOLELITHIASIS: Status: ACTIVE | Noted: 2023-04-28

## 2023-04-28 NOTE — REASON FOR VISIT
[Home] : at home, [unfilled] , at the time of the visit. [Medical Office: ([facility])___] : at the medical office located in  [Patient] : the patient [Self] : self [Post Hospitalization] : a post hospitalization visit [FreeTextEntry4] : JUANITA GALVEZ

## 2023-04-28 NOTE — PHYSICAL EXAM
[Normal] : alert, normal voice/communication, healthy appearing, no acute distress [Sclera] : the sclera and conjunctiva were normal [Hearing Threshold Finger Rub Not [place]] : hearing was normal [Normal Lips/Gums] : the lips and gums were normal [Normal Appearance] : the appearance of the neck was normal [No Respiratory Distress] : no respiratory distress [No Acc Muscle Use] : no accessory muscle use [Respiration, Rhythm And Depth] : normal respiratory rhythm and effort [Normal Color / Pigmentation] : normal skin color and pigmentation [Oriented To Time, Place, And Person] : oriented to person, place, and time

## 2023-04-28 NOTE — HISTORY OF PRESENT ILLNESS
[FreeTextEntry1] : 17 year old female with history of symptomatic gallstones status post cholecystectomy in February 2023, seen for follow-up visit today.\par \par Patient was initially seen in February for biliary colic and concern for choledocholithiasis.\par She had endoscopic evaluation on 2/3/2023.\par -EGD: non erosive gastritis (unremarkable bx - no HP), antral nodule mostly (biopsies: focal prominent foveolar hyperplasia/reactive change). \par -EUS focused examination of the common bile duct: there was a hyperechoic, non shadowing defect was seen in the common bile duct representing biliary sludge. CBD was dilated and measured 7 mm. . \par -ERCP with biliary sphincterotomy and sludge removal: cholangiogram showed a filling defect with a dilated CBD. Multiple sweeps were made with removal of sludge. \par \par She has been doing well since her cholecystectomy however, she presented to the ER a few times with burning epigastric and chest pain which was attributed to reflux.  She was prescribed omeprazole 20 mg which she is taking before her breakfast with improvement of symptoms.  Since starting omeprazole, she had no recurrent heartburn.  She has no other GI complaints at this time.\par She denies SOB, denies crushing CP, denies worsening of sx w/ activity. \par \par Due to her symptoms, she was seen by pediatric GI and had an EGD on 4/24/2023 which was unremarkable.  Biopsies from the distal esophagus, antrum, and duodenum were unremarkable.\par \par Review of systems otherwise negative.\par \par \par Labs from most hospital visit on 4/23/2023 were reviewed.\par CBC with WBC 10, hemoglobin 12.4, hematocrit 38.3, platelets 277, unremarkable CMP with T. bili 0.4, alk phos 97, AST 13, ALT 12\par \par 4/23/23 RUQ US: CBD 4 mm - No sonographic evidence of acute pathology.\par 4/22/23 CT abd/pelvis w/ IV contrast was unremarkable for any intra-abd pathology\par \par PMHx: symptomatic gallstones\par PSHx: cholecystectomy in 2/2023\par Fam hx: no fam hx of GI malignancy\par Social hx: neg x 3\par Meds: omeprazole 20 mg q24h. tylenol PRN\par Allergies: none

## 2023-04-28 NOTE — ASSESSMENT
[FreeTextEntry1] : 17 year old female with history of symptomatic gallstones status post cholecystectomy in February 2023, seen for follow-up visit today.\par \par #heartburn\par sx likely due to GERD \par -dietary and lifestyle modifications recommended\par -continue w/ omeprazole 20 mg q24h 30 mins before breakfast \par -also advised pt to take another dose at nighttime as needed if she plans on eating any GERD inducing foods\par -advised weight loss\par \par #symptomatic gallstones\par s/p ERCP and removal of sludge\par s/p cholecystecomy\par -no intervention\par \par f/u in 3 mo

## 2023-05-10 ENCOUNTER — EMERGENCY (EMERGENCY)
Facility: HOSPITAL | Age: 18
LOS: 0 days | Discharge: HOME | End: 2023-05-11
Attending: EMERGENCY MEDICINE
Payer: MEDICAID

## 2023-05-10 VITALS
OXYGEN SATURATION: 99 % | RESPIRATION RATE: 18 BRPM | WEIGHT: 293 LBS | HEART RATE: 77 BPM | DIASTOLIC BLOOD PRESSURE: 59 MMHG | SYSTOLIC BLOOD PRESSURE: 121 MMHG | TEMPERATURE: 99 F

## 2023-05-10 DIAGNOSIS — R10.33 PERIUMBILICAL PAIN: ICD-10-CM

## 2023-05-10 DIAGNOSIS — Z87.19 PERSONAL HISTORY OF OTHER DISEASES OF THE DIGESTIVE SYSTEM: ICD-10-CM

## 2023-05-10 DIAGNOSIS — R11.2 NAUSEA WITH VOMITING, UNSPECIFIED: ICD-10-CM

## 2023-05-10 DIAGNOSIS — Z90.49 ACQUIRED ABSENCE OF OTHER SPECIFIED PARTS OF DIGESTIVE TRACT: Chronic | ICD-10-CM

## 2023-05-10 DIAGNOSIS — Z90.49 ACQUIRED ABSENCE OF OTHER SPECIFIED PARTS OF DIGESTIVE TRACT: ICD-10-CM

## 2023-05-10 DIAGNOSIS — R10.32 LEFT LOWER QUADRANT PAIN: ICD-10-CM

## 2023-05-10 DIAGNOSIS — R10.13 EPIGASTRIC PAIN: ICD-10-CM

## 2023-05-10 DIAGNOSIS — K21.9 GASTRO-ESOPHAGEAL REFLUX DISEASE WITHOUT ESOPHAGITIS: ICD-10-CM

## 2023-05-10 LAB
ALBUMIN SERPL ELPH-MCNC: 4.3 G/DL — SIGNIFICANT CHANGE UP (ref 3.5–5.2)
ALP SERPL-CCNC: 71 U/L — SIGNIFICANT CHANGE UP (ref 30–115)
ALT FLD-CCNC: 21 U/L — SIGNIFICANT CHANGE UP (ref 14–37)
ANION GAP SERPL CALC-SCNC: 12 MMOL/L — SIGNIFICANT CHANGE UP (ref 7–14)
AST SERPL-CCNC: 47 U/L — HIGH (ref 14–37)
BASE EXCESS BLDV CALC-SCNC: 2.3 MMOL/L — SIGNIFICANT CHANGE UP (ref -2–3)
BASOPHILS # BLD AUTO: 0.02 K/UL — SIGNIFICANT CHANGE UP (ref 0–0.2)
BASOPHILS NFR BLD AUTO: 0.4 % — SIGNIFICANT CHANGE UP (ref 0–1)
BILIRUB SERPL-MCNC: 0.7 MG/DL — SIGNIFICANT CHANGE UP (ref 0.2–1.2)
BUN SERPL-MCNC: 4 MG/DL — LOW (ref 10–20)
CA-I SERPL-SCNC: 1.23 MMOL/L — SIGNIFICANT CHANGE UP (ref 1.15–1.33)
CALCIUM SERPL-MCNC: 9.8 MG/DL — SIGNIFICANT CHANGE UP (ref 8.4–10.4)
CHLORIDE SERPL-SCNC: 102 MMOL/L — SIGNIFICANT CHANGE UP (ref 98–110)
CO2 SERPL-SCNC: 23 MMOL/L — SIGNIFICANT CHANGE UP (ref 17–32)
CREAT SERPL-MCNC: 0.7 MG/DL — SIGNIFICANT CHANGE UP (ref 0.3–1)
EGFR: 128 ML/MIN/1.73M2 — SIGNIFICANT CHANGE UP
EOSINOPHIL # BLD AUTO: 0.03 K/UL — SIGNIFICANT CHANGE UP (ref 0–0.7)
EOSINOPHIL NFR BLD AUTO: 0.5 % — SIGNIFICANT CHANGE UP (ref 0–8)
GAS PNL BLDV: 137 MMOL/L — SIGNIFICANT CHANGE UP (ref 136–145)
GAS PNL BLDV: SIGNIFICANT CHANGE UP
GAS PNL BLDV: SIGNIFICANT CHANGE UP
GLUCOSE SERPL-MCNC: 80 MG/DL — SIGNIFICANT CHANGE UP (ref 70–99)
HCG SERPL QL: NEGATIVE — SIGNIFICANT CHANGE UP
HCO3 BLDV-SCNC: 28 MMOL/L — SIGNIFICANT CHANGE UP (ref 22–29)
HCT VFR BLD CALC: 38.9 % — SIGNIFICANT CHANGE UP (ref 37–47)
HCT VFR BLDA CALC: 36 % — LOW (ref 39–51)
HGB BLD CALC-MCNC: 12 G/DL — LOW (ref 12.6–17.4)
HGB BLD-MCNC: 13 G/DL — SIGNIFICANT CHANGE UP (ref 12–16)
IMM GRANULOCYTES NFR BLD AUTO: 0.2 % — SIGNIFICANT CHANGE UP (ref 0.1–0.3)
LACTATE BLDV-MCNC: 1.2 MMOL/L — SIGNIFICANT CHANGE UP (ref 0.5–2)
LIDOCAIN IGE QN: 27 U/L — SIGNIFICANT CHANGE UP (ref 7–60)
LYMPHOCYTES # BLD AUTO: 2.14 K/UL — SIGNIFICANT CHANGE UP (ref 1.2–3.4)
LYMPHOCYTES # BLD AUTO: 38.9 % — SIGNIFICANT CHANGE UP (ref 20.5–51.1)
MCHC RBC-ENTMCNC: 26.2 PG — LOW (ref 27–31)
MCHC RBC-ENTMCNC: 33.4 G/DL — SIGNIFICANT CHANGE UP (ref 32–37)
MCV RBC AUTO: 78.3 FL — LOW (ref 81–99)
MONOCYTES # BLD AUTO: 0.49 K/UL — SIGNIFICANT CHANGE UP (ref 0.1–0.6)
MONOCYTES NFR BLD AUTO: 8.9 % — SIGNIFICANT CHANGE UP (ref 1.7–9.3)
NEUTROPHILS # BLD AUTO: 2.81 K/UL — SIGNIFICANT CHANGE UP (ref 1.4–6.5)
NEUTROPHILS NFR BLD AUTO: 51.1 % — SIGNIFICANT CHANGE UP (ref 42.2–75.2)
NRBC # BLD: 0 /100 WBCS — SIGNIFICANT CHANGE UP (ref 0–0)
PCO2 BLDV: 49 MMHG — HIGH (ref 39–42)
PH BLDV: 7.37 — SIGNIFICANT CHANGE UP (ref 7.32–7.43)
PLATELET # BLD AUTO: 276 K/UL — SIGNIFICANT CHANGE UP (ref 130–400)
PMV BLD: 12.3 FL — HIGH (ref 7.4–10.4)
PO2 BLDV: 41 MMHG — SIGNIFICANT CHANGE UP
POTASSIUM BLDV-SCNC: 3.6 MMOL/L — SIGNIFICANT CHANGE UP (ref 3.5–5.1)
POTASSIUM SERPL-MCNC: 7.4 MMOL/L — CRITICAL HIGH (ref 3.5–5)
POTASSIUM SERPL-SCNC: 7.4 MMOL/L — CRITICAL HIGH (ref 3.5–5)
PROT SERPL-MCNC: 7.9 G/DL — SIGNIFICANT CHANGE UP (ref 6.1–8)
RBC # BLD: 4.97 M/UL — SIGNIFICANT CHANGE UP (ref 4.2–5.4)
RBC # FLD: 14 % — SIGNIFICANT CHANGE UP (ref 11.5–14.5)
SAO2 % BLDV: 66 % — SIGNIFICANT CHANGE UP
SODIUM SERPL-SCNC: 137 MMOL/L — SIGNIFICANT CHANGE UP (ref 135–146)
WBC # BLD: 5.5 K/UL — SIGNIFICANT CHANGE UP (ref 4.8–10.8)
WBC # FLD AUTO: 5.5 K/UL — SIGNIFICANT CHANGE UP (ref 4.8–10.8)

## 2023-05-10 PROCEDURE — 80053 COMPREHEN METABOLIC PANEL: CPT

## 2023-05-10 PROCEDURE — 83690 ASSAY OF LIPASE: CPT

## 2023-05-10 PROCEDURE — 96375 TX/PRO/DX INJ NEW DRUG ADDON: CPT

## 2023-05-10 PROCEDURE — 74177 CT ABD & PELVIS W/CONTRAST: CPT | Mod: MA

## 2023-05-10 PROCEDURE — 84703 CHORIONIC GONADOTROPIN ASSAY: CPT

## 2023-05-10 PROCEDURE — 36415 COLL VENOUS BLD VENIPUNCTURE: CPT

## 2023-05-10 PROCEDURE — 99284 EMERGENCY DEPT VISIT MOD MDM: CPT | Mod: 25

## 2023-05-10 PROCEDURE — 83605 ASSAY OF LACTIC ACID: CPT

## 2023-05-10 PROCEDURE — 81003 URINALYSIS AUTO W/O SCOPE: CPT

## 2023-05-10 PROCEDURE — 84295 ASSAY OF SERUM SODIUM: CPT

## 2023-05-10 PROCEDURE — 85014 HEMATOCRIT: CPT

## 2023-05-10 PROCEDURE — 96374 THER/PROPH/DIAG INJ IV PUSH: CPT | Mod: XU

## 2023-05-10 PROCEDURE — 82330 ASSAY OF CALCIUM: CPT

## 2023-05-10 PROCEDURE — 84132 ASSAY OF SERUM POTASSIUM: CPT

## 2023-05-10 PROCEDURE — 82803 BLOOD GASES ANY COMBINATION: CPT

## 2023-05-10 PROCEDURE — 85018 HEMOGLOBIN: CPT

## 2023-05-10 PROCEDURE — 85025 COMPLETE CBC W/AUTO DIFF WBC: CPT

## 2023-05-10 PROCEDURE — 74177 CT ABD & PELVIS W/CONTRAST: CPT | Mod: 26,MA

## 2023-05-10 PROCEDURE — 99284 EMERGENCY DEPT VISIT MOD MDM: CPT

## 2023-05-10 PROCEDURE — 87086 URINE CULTURE/COLONY COUNT: CPT

## 2023-05-10 RX ORDER — SODIUM CHLORIDE 9 MG/ML
1000 INJECTION, SOLUTION INTRAVENOUS ONCE
Refills: 0 | Status: COMPLETED | OUTPATIENT
Start: 2023-05-10 | End: 2023-05-10

## 2023-05-10 RX ORDER — FAMOTIDINE 10 MG/ML
20 INJECTION INTRAVENOUS ONCE
Refills: 0 | Status: COMPLETED | OUTPATIENT
Start: 2023-05-10 | End: 2023-05-10

## 2023-05-10 RX ORDER — ONDANSETRON 8 MG/1
4 TABLET, FILM COATED ORAL ONCE
Refills: 0 | Status: COMPLETED | OUTPATIENT
Start: 2023-05-10 | End: 2023-05-10

## 2023-05-10 RX ORDER — SUCRALFATE 1 G
1 TABLET ORAL ONCE
Refills: 0 | Status: COMPLETED | OUTPATIENT
Start: 2023-05-10 | End: 2023-05-10

## 2023-05-10 RX ADMIN — Medication 1 GRAM(S): at 18:05

## 2023-05-10 RX ADMIN — FAMOTIDINE 20 MILLIGRAM(S): 10 INJECTION INTRAVENOUS at 18:05

## 2023-05-10 RX ADMIN — ONDANSETRON 4 MILLIGRAM(S): 8 TABLET, FILM COATED ORAL at 18:05

## 2023-05-10 RX ADMIN — Medication 10 MILLIGRAM(S): at 20:04

## 2023-05-10 RX ADMIN — SODIUM CHLORIDE 1000 MILLILITER(S): 9 INJECTION, SOLUTION INTRAVENOUS at 18:06

## 2023-05-10 NOTE — ED PROVIDER NOTE - PATIENT PORTAL LINK FT
You can access the FollowMyHealth Patient Portal offered by [facility] by registering at the following website: [email or web address]. By joining [employer]’s FollowMyHealth portal, you will also be able to view your health information using other applications (apps) compatible with our system.

## 2023-05-10 NOTE — ED PROVIDER NOTE - CARE PROVIDER_API CALL
Nya Johnston)  Pediatric Gastroenterology  [facility], [address] [place] [address]  Phone: [phone]  Fax: [phone]  Follow Up Time:

## 2023-05-10 NOTE — ED PROVIDER NOTE - OBJECTIVE STATEMENT
16yo F w/ history of GERD, s/p lap cholecystectomy in Feb 2023 who presents for abdominal pain and nausea. 18yo F w/ history of GERD, s/p lap cholecystectomy in Feb 2023 who presents for abdominal pain and nausea for the past day. Patient was admitted 4/20 for intractable abdominal pain.  Patient was scoped by Dr. Johnston, found to have negative EGD.  Now presents with nausea and vomiting since yesterday.  Patient endorses 2 episodes of emesis yesterday, no episodes of emesis today.  Endorses epigastric abdominal pain.  Denies fevers, chills, chest pain, shortness of breath, diarrhea, urinary symptoms.

## 2023-05-10 NOTE — ED PROVIDER NOTE - IV ALTEPLASE EXCL ABS HIDDEN
CC:  Susi Espinoza is here today for Trauma Major   depression, heart racing difficulty breathing and dizziness a couple of time this week at school.     Medications: medications verified, no change    Refills needed today?  YES    Latex allergy or sensitivity: No known latex allergy     Patient would like communication of their results via:      Cell Phone:   Telephone Information:   Mobile [phone]     Okay to leave a message containing results? Yes    Patient's current [facility] status: Active.      Care Teams Verified: No Changes    Depression Screen: yes 8    Tobacco history: verified    Health Maintenance Due   Topic Date Due   • COVID-19 Vaccine (3 - Booster for Pfizer series) 08/10/2021   • Meningococcal Vaccine (2 - 2-dose series) 03/14/2023       Patient is due for topics listed above, she wishes to proceed with Immunization(s) Meningococcal, but is not proceeding with Immunization(s) COVID-19 at this time. The following has occurred: Orders placed for Immunization(s) Meningococcal.   show

## 2023-05-10 NOTE — ED PROVIDER NOTE - PHYSICAL EXAMINATION
VITAL SIGNS: I have reviewed nursing notes and confirm.  CONSTITUTIONAL: well-appearing, non-toxic, NAD  SKIN: Warm dry, normal skin turgor  HEAD: NCAT  EYES: EOMI, PERRLA, no scleral icterus  ENT: Moist mucous membranes, normal pharynx with no erythema or exudates  NECK: Supple; non tender. Full ROM. No cervical LAD  CARD: RRR, no murmurs, rubs or gallops  RESP: clear to ausculation b/l.  No rales, rhonchi, or wheezing.  ABD: soft, + BS,  epigastric and periumbilical tenderness, non-distended, no rebound or guarding. No CVA tenderness  EXT: Full ROM, no bony tenderness, no pedal edema, no calf tenderness  NEURO: normal motor. normal sensory. CN II-XII intact. Cerebellar testing normal. Normal gait.  PSYCH: Cooperative, appropriate.

## 2023-05-10 NOTE — ED PROVIDER NOTE - ATTENDING CONTRIBUTION TO CARE
17-year-old with a past medical history of gastritis, recurrent abdominal discomfort, presents with abdominal pain.  Periumbilical/left lower quadrant.  Positive nonbloody, nonbilious emesis.  No fever.  On exam nontoxic, vital signs noted, abdomen soft, nondistended, mild periumbilical/epigastric tenderness and left lower quadrant tenderness, no rebound or guarding.  Patient had extensive work-up recently, but mainly ruled out peptic ulcer disease, gastritis, or intra-abdominal infection at that time.  With having left lower quadrant tenderness as well and pain recurred, will repeat CT abdomen pelvis.  Disposition pending work-up and reassessment.

## 2023-05-10 NOTE — ED PROVIDER NOTE - NSFOLLOWUPINSTRUCTIONS_ED_ALL_ED_FT
DISCHARGE INSTRUCTIONS  > Please follow up with your pediatrician in 1-3 days  > Please return to ED if you notice fevers, nausea/vomiting, abdominal pain, decreased oral intake, decreased urine output, decreased energy from baseline or any other concerning symptoms    Abdominal Pain    Many things can cause abdominal pain. Usually, abdominal pain is not caused by a disease and will improve without treatment. Your health care provider will do a physical exam and possibly order blood tests and imaging to help determine the seriousness of your pain. However, in many cases, no cause may be found and you may need further testing as an outpatient. Monitor your abdominal pain for any changes.     SEEK IMMEDIATE MEDICAL CARE IF YOU HAVE THE FOLLOWING SYMPTOMS: worsening abdominal pain, vomiting, diarrhea, inability to have bowel movements or pass gas, black or bloody stool, fever accompanying chest pain or back pain, or dizziness/lightheadedness.

## 2023-05-10 NOTE — ED PROVIDER NOTE - CLINICAL SUMMARY MEDICAL DECISION MAKING FREE TEXT BOX
Patient presented for evaluation of abdominal pain.  Required labs, imaging and meds.  No acute abdominal pathology found.  Patient to be discharged outpatient follow-up.

## 2023-05-10 NOTE — ED PROVIDER NOTE - NSFOLLOWUPCLINICS_GEN_ALL_ED_FT
[facility]  Medicine  [address], [place]   Phone: [phone]  Fax:   Follow Up Time: 1-3 Days

## 2023-05-11 VITALS
TEMPERATURE: 98 F | SYSTOLIC BLOOD PRESSURE: 117 MMHG | DIASTOLIC BLOOD PRESSURE: 68 MMHG | OXYGEN SATURATION: 100 % | RESPIRATION RATE: 18 BRPM | HEART RATE: 90 BPM

## 2023-05-11 LAB
APPEARANCE UR: CLEAR — SIGNIFICANT CHANGE UP
BILIRUB UR-MCNC: NEGATIVE — SIGNIFICANT CHANGE UP
COLOR SPEC: SIGNIFICANT CHANGE UP
DIFF PNL FLD: NEGATIVE — SIGNIFICANT CHANGE UP
GLUCOSE UR QL: NEGATIVE — SIGNIFICANT CHANGE UP
KETONES UR-MCNC: NEGATIVE — SIGNIFICANT CHANGE UP
LEUKOCYTE ESTERASE UR-ACNC: NEGATIVE — SIGNIFICANT CHANGE UP
NITRITE UR-MCNC: NEGATIVE — SIGNIFICANT CHANGE UP
PH UR: 6.5 — SIGNIFICANT CHANGE UP (ref 5–8)
PROT UR-MCNC: SIGNIFICANT CHANGE UP
SP GR SPEC: 1 — LOW (ref 1.01–1.03)
UROBILINOGEN FLD QL: SIGNIFICANT CHANGE UP

## 2023-05-11 RX ORDER — SODIUM CHLORIDE 9 MG/ML
1000 INJECTION, SOLUTION INTRAVENOUS ONCE
Refills: 0 | Status: COMPLETED | OUTPATIENT
Start: 2023-05-11 | End: 2023-05-11

## 2023-05-11 RX ORDER — ACETAMINOPHEN 500 MG
650 TABLET ORAL ONCE
Refills: 0 | Status: COMPLETED | OUTPATIENT
Start: 2023-05-11 | End: 2023-05-11

## 2023-05-11 RX ADMIN — Medication 650 MILLIGRAM(S): at 01:00

## 2023-05-12 ENCOUNTER — EMERGENCY (EMERGENCY)
Facility: HOSPITAL | Age: 18
LOS: 0 days | Discharge: HOME | End: 2023-05-13
Attending: PEDIATRICS
Payer: MEDICAID

## 2023-05-12 VITALS
DIASTOLIC BLOOD PRESSURE: 63 MMHG | RESPIRATION RATE: 20 BRPM | TEMPERATURE: 98 F | OXYGEN SATURATION: 99 % | SYSTOLIC BLOOD PRESSURE: 105 MMHG | HEART RATE: 74 BPM | WEIGHT: 293 LBS

## 2023-05-12 DIAGNOSIS — R07.89 OTHER CHEST PAIN: ICD-10-CM

## 2023-05-12 DIAGNOSIS — K21.9 GASTRO-ESOPHAGEAL REFLUX DISEASE WITHOUT ESOPHAGITIS: ICD-10-CM

## 2023-05-12 DIAGNOSIS — R11.2 NAUSEA WITH VOMITING, UNSPECIFIED: ICD-10-CM

## 2023-05-12 DIAGNOSIS — R10.13 EPIGASTRIC PAIN: ICD-10-CM

## 2023-05-12 DIAGNOSIS — Z90.49 ACQUIRED ABSENCE OF OTHER SPECIFIED PARTS OF DIGESTIVE TRACT: Chronic | ICD-10-CM

## 2023-05-12 DIAGNOSIS — Z90.49 ACQUIRED ABSENCE OF OTHER SPECIFIED PARTS OF DIGESTIVE TRACT: ICD-10-CM

## 2023-05-12 LAB
CULTURE RESULTS: SIGNIFICANT CHANGE UP
SPECIMEN SOURCE: SIGNIFICANT CHANGE UP

## 2023-05-12 PROCEDURE — 99284 EMERGENCY DEPT VISIT MOD MDM: CPT

## 2023-05-12 PROCEDURE — 99283 EMERGENCY DEPT VISIT LOW MDM: CPT

## 2023-05-12 PROCEDURE — 93005 ELECTROCARDIOGRAM TRACING: CPT

## 2023-05-12 RX ORDER — ONDANSETRON 8 MG/1
4 TABLET, FILM COATED ORAL ONCE
Refills: 0 | Status: COMPLETED | OUTPATIENT
Start: 2023-05-12 | End: 2023-05-12

## 2023-05-13 PROCEDURE — 93010 ELECTROCARDIOGRAM REPORT: CPT

## 2023-05-13 RX ADMIN — ONDANSETRON 4 MILLIGRAM(S): 8 TABLET, FILM COATED ORAL at 00:48

## 2023-05-13 NOTE — ED PROVIDER NOTE - ATTENDING CONTRIBUTION TO CARE
I personally evaluated the patient. I reviewed the Resident’s or Physician Assistant’s note (as assigned above), and agree with the findings and plan except as documented in my note. 17-year-old female status post cholecystectomy returns to the ER for evaluation of chest pain and epigastric abdominal pain and .  Previous charts reviewed.  She has been afebrile.  Physical Exam: VS reviewed. Pt is well appearing, in no respiratory distress. MMM. Cap refill <2 seconds. Skin with no obvious rash noted.  Chest CTA BL, no wheezing, rales or crackles, good air entry BL.  Normal heart sounds, no murmurs appreciated, no reproducible chest wall pain. Abdomen soft, ND, no guarding, mild epigastric tenderness appreciated.  Neuro exam grossly intact.  Plan: EKG reviewed.  Zofran given.

## 2023-05-13 NOTE — ED PROVIDER NOTE - PATIENT PORTAL LINK FT
You can access the FollowMyHealth Patient Portal offered by [facility] by registering at the following website: [email or web address]. By joining [employer]’s FollowMyHealth portal, you will also be able to view your health information using other applications (apps) compatible with our system.

## 2023-05-13 NOTE — ED PROVIDER NOTE - OBJECTIVE STATEMENT
18yo F w/ history of GERD, s/p lap cholecystectomy in Feb 2023 who presents for chest pain and nausea this morning.  Patient states she had an episode of burning chest pain this morning, took her home omeprazole and states it did not help.  Endorses an episode of spitting up, nonbloody nonbilious.  Patient is able to tolerate p.o.  Patient was seen in the ED 2 days ago for similar symptoms, had a negative work-up.  Denies fevers, chills, shortness of breath, diarrhea, abdominal pain, urinary symptoms, weakness, numbness, trauma, headache.

## 2023-05-13 NOTE — ED PROVIDER NOTE - CARE PROVIDER_API CALL
Nya Johnston)  Pediatric Gastroenterology  [facility], [address] [place] [address]  Phone: [phone]  Fax: [phone]  Established Patient  Follow Up Time: 7-10 Days

## 2023-05-13 NOTE — ED PROVIDER NOTE - CLINICAL SUMMARY MEDICAL DECISION MAKING FREE TEXT BOX
17-year-old female status post cholecystectomy returns to the ER for evaluation of chest pain and epigastric abdominal pain and .  Previous charts reviewed.  She has been afebrile.  Physical Exam: VS reviewed. Pt is well appearing, in no respiratory distress. MMM. Cap refill <2 seconds. Skin with no obvious rash noted.  Chest CTA BL, no wheezing, rales or crackles, good air entry BL.  Normal heart sounds, no murmurs appreciated, no reproducible chest wall pain. Abdomen soft, ND, no guarding, mild epigastric tenderness appreciated.  Neuro exam grossly intact.  Plan: EKG reviewed.  Zofran given.

## 2023-05-17 ENCOUNTER — INPATIENT (INPATIENT)
Facility: HOSPITAL | Age: 18
LOS: 2 days | Discharge: HOME | DRG: 251 | End: 2023-05-20
Attending: PEDIATRICS | Admitting: PEDIATRICS
Payer: MEDICAID

## 2023-05-17 VITALS
TEMPERATURE: 98 F | HEART RATE: 63 BPM | RESPIRATION RATE: 18 BRPM | OXYGEN SATURATION: 100 % | WEIGHT: 240.08 LBS | SYSTOLIC BLOOD PRESSURE: 120 MMHG | DIASTOLIC BLOOD PRESSURE: 63 MMHG | HEIGHT: 63 IN

## 2023-05-17 DIAGNOSIS — Z90.49 ACQUIRED ABSENCE OF OTHER SPECIFIED PARTS OF DIGESTIVE TRACT: Chronic | ICD-10-CM

## 2023-05-17 PROCEDURE — 99285 EMERGENCY DEPT VISIT HI MDM: CPT

## 2023-05-18 ENCOUNTER — TRANSCRIPTION ENCOUNTER (OUTPATIENT)
Age: 18
End: 2023-05-18

## 2023-05-18 DIAGNOSIS — R10.9 UNSPECIFIED ABDOMINAL PAIN: ICD-10-CM

## 2023-05-18 LAB
ALBUMIN SERPL ELPH-MCNC: 4.1 G/DL — SIGNIFICANT CHANGE UP (ref 3.5–5.2)
ALP SERPL-CCNC: 83 U/L — SIGNIFICANT CHANGE UP (ref 30–115)
ALT FLD-CCNC: 11 U/L — LOW (ref 14–37)
AMPHET UR-MCNC: NEGATIVE — SIGNIFICANT CHANGE UP
ANION GAP SERPL CALC-SCNC: 10 MMOL/L — SIGNIFICANT CHANGE UP (ref 7–14)
AST SERPL-CCNC: 10 U/L — LOW (ref 14–37)
BARBITURATES UR SCN-MCNC: NEGATIVE — SIGNIFICANT CHANGE UP
BASOPHILS # BLD AUTO: 0.02 K/UL — SIGNIFICANT CHANGE UP (ref 0–0.2)
BASOPHILS NFR BLD AUTO: 0.3 % — SIGNIFICANT CHANGE UP (ref 0–1)
BENZODIAZ UR-MCNC: NEGATIVE — SIGNIFICANT CHANGE UP
BILIRUB SERPL-MCNC: 0.4 MG/DL — SIGNIFICANT CHANGE UP (ref 0.2–1.2)
BUN SERPL-MCNC: 9 MG/DL — LOW (ref 10–20)
CALCIUM SERPL-MCNC: 9.4 MG/DL — SIGNIFICANT CHANGE UP (ref 8.4–10.5)
CHLORIDE SERPL-SCNC: 106 MMOL/L — SIGNIFICANT CHANGE UP (ref 98–110)
CO2 SERPL-SCNC: 26 MMOL/L — SIGNIFICANT CHANGE UP (ref 17–32)
COCAINE METAB.OTHER UR-MCNC: NEGATIVE — SIGNIFICANT CHANGE UP
CREAT SERPL-MCNC: 0.9 MG/DL — SIGNIFICANT CHANGE UP (ref 0.3–1)
EGFR: 94 ML/MIN/1.73M2 — SIGNIFICANT CHANGE UP
EOSINOPHIL # BLD AUTO: 0.03 K/UL — SIGNIFICANT CHANGE UP (ref 0–0.7)
EOSINOPHIL NFR BLD AUTO: 0.4 % — SIGNIFICANT CHANGE UP (ref 0–8)
GLUCOSE SERPL-MCNC: 81 MG/DL — SIGNIFICANT CHANGE UP (ref 70–99)
HCG SERPL QL: NEGATIVE — SIGNIFICANT CHANGE UP
HCT VFR BLD CALC: 37.9 % — SIGNIFICANT CHANGE UP (ref 37–47)
HGB BLD-MCNC: 12.3 G/DL — SIGNIFICANT CHANGE UP (ref 12–16)
IMM GRANULOCYTES NFR BLD AUTO: 0.1 % — SIGNIFICANT CHANGE UP (ref 0.1–0.3)
LACTATE SERPL-SCNC: 0.9 MMOL/L — SIGNIFICANT CHANGE UP (ref 0.7–2)
LIDOCAIN IGE QN: 22 U/L — SIGNIFICANT CHANGE UP (ref 7–60)
LYMPHOCYTES # BLD AUTO: 2.37 K/UL — SIGNIFICANT CHANGE UP (ref 1.2–3.4)
LYMPHOCYTES # BLD AUTO: 34 % — SIGNIFICANT CHANGE UP (ref 20.5–51.1)
MCHC RBC-ENTMCNC: 25.8 PG — LOW (ref 27–31)
MCHC RBC-ENTMCNC: 32.5 G/DL — SIGNIFICANT CHANGE UP (ref 32–37)
MCV RBC AUTO: 79.6 FL — LOW (ref 81–99)
METHADONE UR-MCNC: NEGATIVE — SIGNIFICANT CHANGE UP
MONOCYTES # BLD AUTO: 0.7 K/UL — HIGH (ref 0.1–0.6)
MONOCYTES NFR BLD AUTO: 10 % — HIGH (ref 1.7–9.3)
NEUTROPHILS # BLD AUTO: 3.84 K/UL — SIGNIFICANT CHANGE UP (ref 1.4–6.5)
NEUTROPHILS NFR BLD AUTO: 55.2 % — SIGNIFICANT CHANGE UP (ref 42.2–75.2)
NRBC # BLD: 0 /100 WBCS — SIGNIFICANT CHANGE UP (ref 0–0)
OPIATES UR-MCNC: NEGATIVE — SIGNIFICANT CHANGE UP
PCP SPEC-MCNC: SIGNIFICANT CHANGE UP
PLATELET # BLD AUTO: 253 K/UL — SIGNIFICANT CHANGE UP (ref 130–400)
PMV BLD: 12.1 FL — HIGH (ref 7.4–10.4)
POTASSIUM SERPL-MCNC: 4 MMOL/L — SIGNIFICANT CHANGE UP (ref 3.5–5)
POTASSIUM SERPL-SCNC: 4 MMOL/L — SIGNIFICANT CHANGE UP (ref 3.5–5)
PROPOXYPHENE QUALITATIVE URINE RESULT: NEGATIVE — SIGNIFICANT CHANGE UP
PROT SERPL-MCNC: 6.8 G/DL — SIGNIFICANT CHANGE UP (ref 6.1–8)
RBC # BLD: 4.76 M/UL — SIGNIFICANT CHANGE UP (ref 4.2–5.4)
RBC # FLD: 14.3 % — SIGNIFICANT CHANGE UP (ref 11.5–14.5)
S PYO DNA THROAT QL NAA+PROBE: SIGNIFICANT CHANGE UP
SODIUM SERPL-SCNC: 142 MMOL/L — SIGNIFICANT CHANGE UP (ref 135–146)
WBC # BLD: 6.97 K/UL — SIGNIFICANT CHANGE UP (ref 4.8–10.8)
WBC # FLD AUTO: 6.97 K/UL — SIGNIFICANT CHANGE UP (ref 4.8–10.8)

## 2023-05-18 PROCEDURE — 81001 URINALYSIS AUTO W/SCOPE: CPT

## 2023-05-18 PROCEDURE — 80307 DRUG TEST PRSMV CHEM ANLYZR: CPT

## 2023-05-18 PROCEDURE — 36415 COLL VENOUS BLD VENIPUNCTURE: CPT

## 2023-05-18 PROCEDURE — 83605 ASSAY OF LACTIC ACID: CPT

## 2023-05-18 PROCEDURE — 80349 CANNABINOIDS NATURAL: CPT

## 2023-05-18 PROCEDURE — 93010 ELECTROCARDIOGRAM REPORT: CPT

## 2023-05-18 PROCEDURE — 87798 DETECT AGENT NOS DNA AMP: CPT

## 2023-05-18 PROCEDURE — 85025 COMPLETE CBC W/AUTO DIFF WBC: CPT

## 2023-05-18 PROCEDURE — 80053 COMPREHEN METABOLIC PANEL: CPT

## 2023-05-18 PROCEDURE — 84703 CHORIONIC GONADOTROPIN ASSAY: CPT

## 2023-05-18 PROCEDURE — 93005 ELECTROCARDIOGRAM TRACING: CPT

## 2023-05-18 PROCEDURE — 87086 URINE CULTURE/COLONY COUNT: CPT

## 2023-05-18 PROCEDURE — 99222 1ST HOSP IP/OBS MODERATE 55: CPT

## 2023-05-18 PROCEDURE — 87651 STREP A DNA AMP PROBE: CPT

## 2023-05-18 PROCEDURE — 83690 ASSAY OF LIPASE: CPT

## 2023-05-18 RX ORDER — SUCRALFATE 1 G
1 TABLET ORAL ONCE
Refills: 0 | Status: COMPLETED | OUTPATIENT
Start: 2023-05-18 | End: 2023-05-18

## 2023-05-18 RX ORDER — IBUPROFEN 200 MG
400 TABLET ORAL ONCE
Refills: 0 | Status: DISCONTINUED | OUTPATIENT
Start: 2023-05-18 | End: 2023-05-18

## 2023-05-18 RX ORDER — ACETAMINOPHEN 500 MG
650 TABLET ORAL EVERY 6 HOURS
Refills: 0 | Status: DISCONTINUED | OUTPATIENT
Start: 2023-05-18 | End: 2023-05-19

## 2023-05-18 RX ORDER — ONDANSETRON 8 MG/1
4 TABLET, FILM COATED ORAL EVERY 8 HOURS
Refills: 0 | Status: DISCONTINUED | OUTPATIENT
Start: 2023-05-18 | End: 2023-05-20

## 2023-05-18 RX ORDER — IBUPROFEN 200 MG
600 TABLET ORAL ONCE
Refills: 0 | Status: COMPLETED | OUTPATIENT
Start: 2023-05-18 | End: 2023-05-18

## 2023-05-18 RX ORDER — SODIUM CHLORIDE 9 MG/ML
3 INJECTION INTRAMUSCULAR; INTRAVENOUS; SUBCUTANEOUS EVERY 8 HOURS
Refills: 0 | Status: DISCONTINUED | OUTPATIENT
Start: 2023-05-18 | End: 2023-05-18

## 2023-05-18 RX ORDER — PANTOPRAZOLE SODIUM 20 MG/1
40 TABLET, DELAYED RELEASE ORAL
Refills: 0 | Status: DISCONTINUED | OUTPATIENT
Start: 2023-05-19 | End: 2023-05-20

## 2023-05-18 RX ORDER — POLYETHYLENE GLYCOL 3350 17 G/17G
17 POWDER, FOR SOLUTION ORAL DAILY
Refills: 0 | Status: DISCONTINUED | OUTPATIENT
Start: 2023-05-18 | End: 2023-05-20

## 2023-05-18 RX ORDER — SODIUM CHLORIDE 9 MG/ML
1000 INJECTION INTRAMUSCULAR; INTRAVENOUS; SUBCUTANEOUS ONCE
Refills: 0 | Status: COMPLETED | OUTPATIENT
Start: 2023-05-18 | End: 2023-05-18

## 2023-05-18 RX ORDER — FAMOTIDINE 10 MG/ML
20 INJECTION INTRAVENOUS DAILY
Refills: 0 | Status: DISCONTINUED | OUTPATIENT
Start: 2023-05-18 | End: 2023-05-18

## 2023-05-18 RX ORDER — PANTOPRAZOLE SODIUM 20 MG/1
40 TABLET, DELAYED RELEASE ORAL ONCE
Refills: 0 | Status: COMPLETED | OUTPATIENT
Start: 2023-05-18 | End: 2023-05-18

## 2023-05-18 RX ADMIN — SODIUM CHLORIDE 2000 MILLILITER(S): 9 INJECTION INTRAMUSCULAR; INTRAVENOUS; SUBCUTANEOUS at 05:13

## 2023-05-18 RX ADMIN — FAMOTIDINE 20 MILLIGRAM(S): 10 INJECTION INTRAVENOUS at 02:18

## 2023-05-18 RX ADMIN — Medication 650 MILLIGRAM(S): at 22:22

## 2023-05-18 RX ADMIN — Medication 650 MILLIGRAM(S): at 09:42

## 2023-05-18 RX ADMIN — Medication 650 MILLIGRAM(S): at 22:23

## 2023-05-18 RX ADMIN — Medication 1 GRAM(S): at 02:18

## 2023-05-18 RX ADMIN — POLYETHYLENE GLYCOL 3350 17 GRAM(S): 17 POWDER, FOR SOLUTION ORAL at 09:43

## 2023-05-18 RX ADMIN — Medication 650 MILLIGRAM(S): at 16:58

## 2023-05-18 RX ADMIN — Medication 600 MILLIGRAM(S): at 15:56

## 2023-05-18 RX ADMIN — Medication 650 MILLIGRAM(S): at 10:12

## 2023-05-18 RX ADMIN — Medication 600 MILLIGRAM(S): at 15:26

## 2023-05-18 RX ADMIN — Medication 650 MILLIGRAM(S): at 16:28

## 2023-05-18 RX ADMIN — PANTOPRAZOLE SODIUM 40 MILLIGRAM(S): 20 TABLET, DELAYED RELEASE ORAL at 02:18

## 2023-05-18 RX ADMIN — ONDANSETRON 4 MILLIGRAM(S): 8 TABLET, FILM COATED ORAL at 14:17

## 2023-05-18 NOTE — ED PROVIDER NOTE - PHYSICAL EXAMINATION
CONSTITUTIONAL: NAD, obese  SKIN: Warm, dry  HEAD: NCAT  EYES: Clear conjunctiva   ENT: MMM  NECK: Supple  CARD: RRR, S1, S2; no M/R/G  RESP: Speaking in full sentences; CTAB  ABD: soft, mild LUQ ttp, no R/G  EXT: Pulses palpable distally  NEURO: Grossly intact, moving all extremities  PSYCH: Cooperative, appropriate

## 2023-05-18 NOTE — PATIENT PROFILE PEDIATRIC - NS PRO MODE OF ARRIVAL
Dear Team Member Corinna Macedo,    Per your recent telephone screening with Employee Health:  · You tested positive for COVID-19 on 11/19/20 at [facility] Test site. You have been asymptomatic before and after testing.  · You are to remain off work and isolate yourself from public places except to seek medical care  · Complete the symptom tracker daily on [employer] [facility] to comply with your actively monitoring status  ---------------------------------  Please email or fax external lab results to:  [email or web address] or fax to [facility] [employer] at [phone]    Be sure that your name and birthdate are on the results as well as your MRN number #[id number]    In the body of the email also state you are sending External Covid-19 results  and include your  Name  Birth Date  MRN #[id number]  ---------------------------------  YOU MAY NOT RETURN TO WORK WITHOUT CLEARANCE FROM EMPLOYEE HEALTH.  It is not necessary to call Employee Health.  The Central COVID Team will contact you at the appropriate interval for clearance.    Off work start date: 11/23/20      Notify Employee Health if you have a marked increase in symptoms while you are off. Call [phone] or email [email or web address].    For [place] Employees, please follow this link to view a copy of the consent form:  [place] Consent Form     For [place] Employees, please follow this link to view a copy of the consent form:   [place] Employee Consent Form     Thank you,    Employee Health Central COVID Team  [phone] (please add this number to your phone contacts to avoid missed calls)  Hours: M-F [phone] Saturday  - please answer your phone if an outside line is calling, regardless of hours we work 7-days a week and will follow up as appropriate.     [email or web address]    Cc: Manager   stretcher

## 2023-05-18 NOTE — ED PROVIDER NOTE - ATTENDING CONTRIBUTION TO CARE
I personally evaluated the patient. I reviewed the Resident’s or Physician Assistant’s note (as assigned above), and agree with the findings and plan except as documented in my note.17-year-old here for evaluation of abdominal pain as per patient is epigastric has been here before for similar signs and symptoms status post cholecystectomy in February 2023 status post endoscopy in 4/23 but patient states her pain still persist was offered to take omeprazole daily and increased to twice daily but patient states it did not help no known allergies physical exam is remarkable for epigastric pain will give meds and reassess

## 2023-05-18 NOTE — ED PROVIDER NOTE - OBJECTIVE STATEMENT
17yoF PMHx cholecystectomy in Feb 2023, presenting for persistent epigastric pain for the past 2 months described as "bad acid reflux" associated with daily watery, nonbloody diarrhea and intermittent vomiting. Last episode of vomiting was several days ago. Patient has been seen in the ED 5 times for the same symptoms, and was admitted for similar symptoms 3 weeks ago, when she was discharged with prescription for omeprazole. Pt reports she has been taking omeprazole once daily for the past 3 weeks without improvement, and she has not been able to see GI for outpatient follow up yet as soonest appointment was not available for weeks.  Patient lives at a student dorm currently and is presenting with her friend of same age who lives in the same dorm has been presenting with her in the ED at the same time over the past week. Patient denies chest pain, SOB, fever, rash, dysuria, recent travel or change in diet

## 2023-05-18 NOTE — ED PROVIDER NOTE - NSICDXPASTSURGICALHX_GEN_ALL_CORE_FT
Spoke with Jose Raul Colindres who reports productive cough and sinus congestion and pain for 10 days. No fevers.   Feels asthma is exacerbated: has been using nebulized albuterol 4 - 6 times a day for the past 2 days. Completed prednisone taper about 3 days ago. Has wheezing. Able to speak in full sentences.  She does not want to go to the ED; does not feel she needs ED treatment.       Acute bacterial sinusitis  -     clindamycin (CLEOCIN) 300 MG capsule; Take 1 capsule by mouth 4 times daily for 7 days.  -     Dextromethorphan-guaiFENesin (guaiFENesin-DM) 100-10 MG/5ML Liquid; Take 10 mLs by mouth 4 times daily as needed (cough).    Severe persistent asthma   -     predniSONE (DELTASONE) 10 MG tablet; 40mg for 2 days, then 30mg for 2 days, then 20mg for 2 days, then 10mg for 2 days    Call or return to clinic as needed if these symptoms worsen, fail to improve as anticipated, or if new symptoms develop.   Jose Raul had no unanswered questions.   Arthur Up MD      PAST SURGICAL HISTORY:  S/P cholecystectomy

## 2023-05-18 NOTE — H&P PEDIATRIC - ATTENDING COMMENTS
Attending:  Pt seen on rounds, see resident note fir details, agree with plan of care. H/o constipation. PE-AAO x3. Pharyngeal congestion, no lymphadenopathy. Chest-CTA, normal hs, RRR. abdomen-soft, non tender, bs +ve, no masses. Mild discomfort +ve. Likely constipation. Throat c/s, Stool PCR, Miralax. Will follow.

## 2023-05-18 NOTE — H&P PEDIATRIC - HISTORY OF PRESENT ILLNESS
ALEJANDRA GARCES    18yo F w/ history of GERD s/p laparoscopic cholecystectomy in Feb 2023 presenting w/ intractable abdominal pain. Patient notes that she has been having epigastric abdominal pain which has been acutely worsening in the past week. Patient states that pain is intermittent, sharp, with radiation to the chest, rated at 7/10 at its worst. Pain is often associated with nausea however she denies any episodes of emesis. Reports having She states that pain is often triggered by eating meals with most recent meal being avocados. She states that in the past she has been prescribed Pepcid, Omeprazole and Sucralfate with minimal improvements. Patient has scheduled appointment with GI on 4/24. LMP was 3 weeks ago. Denies any recent illnesses, fevers, cough, congestion, constipation or diarrhea.    PMHx: GERD  PSHx: s/p cholecystectomy (Feb 2023)  Meds: Pepcid BID, Omeprazole, Sucralafate prior to meals  All: NKDA   FHx: Non-contributory  SHx:   HEADSSS  - Home: Lives at home with mother, father, brother, sister, no pets, no smoke exposure. 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  - Activities: Enjoys hanging out with friends  - Drugs: Has used marijuana in the past, no recent use since surgery  - Sexuality: Has been sexually active in the past using protection, not on any birth control  - Suicide/Depression: Denies any SI/HI, has good support systems at home  PMD: Has not established care with PMD  Vaccines: UTD    ED Course: CBCd, CMP, EKG, Famotidine x1, Sucralfate x1      Review of Systems  Constitutional: (-) fever (-) weakness (-) diaphoresis (-) pain  Eyes: (-) change in vision (-) photophobia (-) eye pain  ENT: (-) sore throat (-) ear pain  (-) nasal discharge (-) congestion  Cardiovascular: (-) chest pain (-) palpitations  Respiratory: (-) SOB (-) cough (-) WOB   GI: (-) abdominal pain (-) nausea (-) vomiting (-) diarrhea (-) constipation  : (-) dysuria (-) hematuria (-) increased frequency (-) increased urgency  Integumentary: (-) rash (-) redness (-) joint pain (-) MSK pain (-) swelling  Neurological:  (-) focal deficit (-) altered mental status (-) dizziness  General: (-) recent travel (-) sick contacts (-) decreased PO (-) urine output     Vital Signs Last 24 Hrs  T(C): 36.9 (17 May 2023 23:25), Max: 36.9 (17 May 2023 23:25)  T(F): 98.4 (17 May 2023 23:25), Max: 98.4 (17 May 2023 23:25)  HR: 63 (17 May 2023 23:25) (63 - 63)  BP: 120/63 (17 May 2023 23:25) (120/63 - 120/63)  RR: 18 (17 May 2023 23:25) (18 - 18)  SpO2: 100% (17 May 2023 23:25) (100% - 100%)    Parameters below as of 17 May 2023 23:25  Patient On (Oxygen Delivery Method): room air    Drug Dosing Weight  Height (cm): 160 (17 May 2023 23:25)  Weight (kg): 108.9 (17 May 2023 23:25)  BMI (kg/m2): 42.5 (17 May 2023 23:25)  BSA (m2): 2.09 (17 May 2023 23:25)    Physical Exam:  GENERAL: obese body habitus, lying comfortably in bed, no acute distress  HEENT: NCAT, conjunctiva clear and not injected, sclera non-icteric, PERRLA, EACs clear, TMs nonbulging/nonerythematous, nares patent, mucous membranes moist, no mucosal lesions, pharynx nonerythematous, no tonsillar hypertrophy or exudate, neck supple, no cervical lymphadenopathy  HEART: RRR, S1, S2, no rubs, murmurs, or gallops, RP/DP present, cap refill <2 seconds  LUNG: CTAB, no wheezing, no ronchi, no crackles, no retractions, no belly breathing, no tachypnea  ABDOMEN: +BS, soft, nontender, nondistended, no hepatomegaly, no splenomegaly, no hernia    Medications:  MEDICATIONS  (STANDING):  famotidine    Tablet 20 milliGRAM(s) Oral daily    Labs:  CBC Full  -  ( 18 May 2023 05:05 )  WBC Count : 6.97 K/uL  RBC Count : 4.76 M/uL  Hemoglobin : 12.3 g/dL  Hematocrit : 37.9 %  Platelet Count - Automated : 253 K/uL  Mean Cell Volume : 79.6 fL  Mean Cell Hemoglobin : 25.8 pg  Mean Cell Hemoglobin Concentration : 32.5 g/dL  Auto Neutrophil # : 3.84 K/uL  Auto Lymphocyte # : 2.37 K/uL  Auto Monocyte # : 0.70 K/uL  Auto Eosinophil # : 0.03 K/uL  Auto Basophil # : 0.02 K/uL  Auto Neutrophil % : 55.2 %  Auto Lymphocyte % : 34.0 %  Auto Monocyte % : 10.0 %  Auto Eosinophil % : 0.4 %  Auto Basophil % : 0.3 %      Assessment:    Plan:      ALEJANDRA GARCES    18yo F PMH of GERD s/p laparoscopic cholecystectomy in Feb 2023 presenting w/ epigastric pain x2 months, admitted for inpatient management of intractable pain. Patient notes that she has been having epigastric abdominal pain which has been acutely worsening in the past week. Patient states that pain is intermittent, sharp, with radiation to the chest, rated at 7/10 at its worst. Pain is often associated with nausea however she denies any episodes of emesis. Reports having She states that pain is often triggered by eating meals with most recent meal being avocados. She states that in the past she has been prescribed Pepcid, Omeprazole and Sucralfate with minimal improvements. Patient has scheduled appointment with GI on 4/24. LMP was 3 weeks ago. Denies any recent illnesses, fevers, cough, congestion, constipation or diarrhea.    17yoF PMHx cholecystectomy in Feb 2023, presenting for persistent epigastric pain for the past 2 months described as "bad acid reflux" associated with daily watery, nonbloody diarrhea and intermittent vomiting. Last episode of vomiting was several days ago. Patient has been seen in the ED 5 times for the same symptoms, and was admitted for similar symptoms 3 weeks ago, when she was discharged with prescription for omeprazole. Pt reports she has been taking omeprazole once daily for the past 3 weeks without improvement, and she has not been able to see GI for outpatient follow up yet as soonest appointment was not available for weeks.  Patient lives at a student dorm currently and is presenting with her friend of same age who lives in the same dorm has been presenting with her in the ED at the same time over the past week. Patient denies chest pain, SOB, fever, rash, dysuria, recent travel or change in diet    PMHx: GERD  PSHx: s/p cholecystectomy (Feb 2023)  Meds: Pepcid BID, Omeprazole, Sucralafate prior to meals  All: NKDA   FHx: Non-contributory  SHx:   HEADSSS  - Home: Lives at home with mother, father, brother, sister, no pets, no smoke exposure. 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  - Activities: Enjoys hanging out with friends  - Drugs: Has used marijuana in the past, no recent use since surgery  - Sexuality: Has been sexually active in the past using protection, not on any birth control  - Suicide/Depression: Denies any SI/HI, has good support systems at home  PMD: Has not established care with PMD  Vaccines: UTD    ED Course: CBCd, CMP, EKG, Famotidine x1, Sucralfate x1      Review of Systems  Constitutional: (-) fever (-) weakness (-) diaphoresis (-) pain  Eyes: (-) change in vision (-) photophobia (-) eye pain  ENT: (-) sore throat (-) ear pain  (-) nasal discharge (-) congestion  Cardiovascular: (-) chest pain (-) palpitations  Respiratory: (-) SOB (-) cough (-) WOB   GI: (-) abdominal pain (-) nausea (-) vomiting (-) diarrhea (-) constipation  : (-) dysuria (-) hematuria (-) increased frequency (-) increased urgency  Integumentary: (-) rash (-) redness (-) joint pain (-) MSK pain (-) swelling  Neurological:  (-) focal deficit (-) altered mental status (-) dizziness  General: (-) recent travel (-) sick contacts (-) decreased PO (-) urine output     Vital Signs Last 24 Hrs  T(C): 36.9 (17 May 2023 23:25), Max: 36.9 (17 May 2023 23:25)  T(F): 98.4 (17 May 2023 23:25), Max: 98.4 (17 May 2023 23:25)  HR: 63 (17 May 2023 23:25) (63 - 63)  BP: 120/63 (17 May 2023 23:25) (120/63 - 120/63)  RR: 18 (17 May 2023 23:25) (18 - 18)  SpO2: 100% (17 May 2023 23:25) (100% - 100%)    Parameters below as of 17 May 2023 23:25  Patient On (Oxygen Delivery Method): room air    Drug Dosing Weight  Height (cm): 160 (17 May 2023 23:25)  Weight (kg): 108.9 (17 May 2023 23:25)  BMI (kg/m2): 42.5 (17 May 2023 23:25)  BSA (m2): 2.09 (17 May 2023 23:25)    Physical Exam:  GENERAL: obese body habitus, lying comfortably in bed, no acute distress  HEENT: NCAT, conjunctiva clear and not injected, sclera non-icteric, PERRLA, EACs clear, TMs nonbulging/nonerythematous, nares patent, mucous membranes moist, no mucosal lesions, pharynx nonerythematous, no tonsillar hypertrophy or exudate, neck supple, no cervical lymphadenopathy  HEART: RRR, S1, S2, no rubs, murmurs, or gallops, RP/DP present, cap refill <2 seconds  LUNG: CTAB, no wheezing, no ronchi, no crackles, no retractions, no belly breathing, no tachypnea  ABDOMEN: +BS, soft, nontender, nondistended, no hepatomegaly, no splenomegaly, no hernia    Medications:  MEDICATIONS  (STANDING):  famotidine    Tablet 20 milliGRAM(s) Oral daily    Labs:  CBC Full  -  ( 18 May 2023 05:05 )  WBC Count : 6.97 K/uL  RBC Count : 4.76 M/uL  Hemoglobin : 12.3 g/dL  Hematocrit : 37.9 %  Platelet Count - Automated : 253 K/uL  Mean Cell Volume : 79.6 fL  Mean Cell Hemoglobin : 25.8 pg  Mean Cell Hemoglobin Concentration : 32.5 g/dL  Auto Neutrophil # : 3.84 K/uL  Auto Lymphocyte # : 2.37 K/uL  Auto Monocyte # : 0.70 K/uL  Auto Eosinophil # : 0.03 K/uL  Auto Basophil # : 0.02 K/uL  Auto Neutrophil % : 55.2 %  Auto Lymphocyte % : 34.0 %  Auto Monocyte % : 10.0 %  Auto Eosinophil % : 0.4 %  Auto Basophil % : 0.3 %      Assessment:    Plan:      ALEJANDRA GARCES    16yo F PMH of GERD s/p laparoscopic cholecystectomy in Feb 2023 presenting w/ epigastric pain x2 months, admitted for inpatient management of intractable pain. Patient c/p worsening "acid reflux" for the past 2 months. Describes reflux as intermittent, sharp, epigastric pain that extends to back. Symptoms are usually worse in the evening after eating. Nothing seems to relieve the pain. Patient has been seen in the ED 3 times in the past week for the same symptoms. Pt was admitted for similar symptoms 3 weeks ago, when EGD was performed and pt was discharged home on omeprazole. Pt has been taking omeprazole once daily without improvement. Has not followed up with GI outpatient as she said she couldn't obtain an appointment. Endorses loose, green-colored stools daily, intermittent NBNB vomiting 2-3x weekly, and diffuse HA with sore throat x1-2 weeks. Denies fever, chest pain, SOB, rash, dysuria, cough, congestion, or runny nose. No recent travel or change in diet.     PMHx: GERD  PSHx: s/p cholecystectomy (Feb 2023)  Meds: Pepcid qd  All: NKDA   FHx: Mom - asthma, hernia  SHx:   HEADSSS  - Home: Lives at home with parents, brother, and sister. No pets. No smoke exposure.   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, will be entering her second year, lives in the dorms during school year  - Activities: Enjoys hanging out with friends  - Drugs: Has used marijuana and alcohol in the past, no use since cholecystectomy in February   - Sexuality: Has been sexually active with males in the past but not currently, no concerns for STI's  - Suicide/Depression: Denies any SI/HI, has good support systems at home  PMD: Has not established care with PMD  Vaccines: UTD    ED Course: CBCd, CMP, Lipase, Lactate, pepcid x1, pantoprazole x1, carafate x1, NS bolus x1    Review of Systems  Constitutional: (-) fever (+) pain  ENT: (+) sore throat (-) ear pain  (-) nasal discharge (-) congestion  Cardiovascular: (+) chest pain (-) palpitations  Respiratory: (-) SOB (-) cough (-) WOB   GI: (+) abdominal pain (+) nausea (+) vomiting (+) diarrhea (-) constipation  : (-) dysuria (-) hematuria (-) increased frequency (-) increased urgency  Integumentary: (-) rash (-) redness (-) joint pain  Neurological:  (-) dizziness  General: (-) recent travel (-) sick contacts (-) decreased PO (-) urine output     Vital Signs Last 24 Hrs  T(C): 36.9 (17 May 2023 23:25), Max: 36.9 (17 May 2023 23:25)  T(F): 98.4 (17 May 2023 23:25), Max: 98.4 (17 May 2023 23:25)  HR: 63 (17 May 2023 23:25) (63 - 63)  BP: 120/63 (17 May 2023 23:25) (120/63 - 120/63)  RR: 18 (17 May 2023 23:25) (18 - 18)  SpO2: 100% (17 May 2023 23:25) (100% - 100%)    Parameters below as of 17 May 2023 23:25  Patient On (Oxygen Delivery Method): room air    Drug Dosing Weight  Height (cm): 160 (17 May 2023 23:25)  Weight (kg): 108.9 (17 May 2023 23:25)  BMI (kg/m2): 42.5 (17 May 2023 23:25)  BSA (m2): 2.09 (17 May 2023 23:25)    Physical Exam:  GENERAL: obese body habitus, lying comfortably in bed, no acute distress  HEENT: NCAT, conjunctiva clear and not injected, sclera non-icteric, PERRLA, EACs clear, TMs nonbulging/nonerythematous, nares patent, mucous membranes moist, no mucosal lesions, (+) mildly erythematous oropharynx, no tonsillar hypertrophy or exudate, neck supple, no cervical lymphadenopathy  HEART: RRR, S1, S2, no rubs, no murmurs, cap refill <2 seconds  LUNG: CTAB, no wheezing, no ronchi, no crackles  ABDOMEN: exam somewhat limited due to body habitus, +BS, soft, (+) tender to palpation of epigastric region extending to LUQ > RUQ, no hernia    Medications:  MEDICATIONS  (STANDING):  famotidine    Tablet 20 milliGRAM(s) Oral daily    Labs:  CBC Full  -  ( 18 May 2023 05:05 )  WBC Count : 6.97 K/uL  RBC Count : 4.76 M/uL  Hemoglobin : 12.3 g/dL  Hematocrit : 37.9 %  Platelet Count - Automated : 253 K/uL  Mean Cell Volume : 79.6 fL  Mean Cell Hemoglobin : 25.8 pg  Mean Cell Hemoglobin Concentration : 32.5 g/dL  Auto Neutrophil # : 3.84 K/uL  Auto Lymphocyte # : 2.37 K/uL  Auto Monocyte # : 0.70 K/uL  Auto Eosinophil # : 0.03 K/uL  Auto Basophil # : 0.02 K/uL  Auto Neutrophil % : 55.2 %  Auto Lymphocyte % : 34.0 %  Auto Monocyte % : 10.0 %  Auto Eosinophil % : 0.4 %  Auto Basophil % : 0.3 %    Assessment:  16yo F PMH of GERD s/p laparoscopic cholecystectomy in Feb 2023 presenting w/ epigastric pain x2 months, admitted for inpatient management of intractable pain. Afebrile, vitals appropriate for age. PE pertinent for pain to palpation of epigastric region and LUQ > RUQ. Labs grossly unremarkable, normal lipase and liver enzymes. Previous testing over the last couple of days show negative UA/UCx, EKG, and CT abdomen/pelvis. Differentials for current presentation include worsening GERD vs peptic ulcer disease, given worsening of pain with eating and location. In the setting of diarrhea and vomiting, differential could include H.pylori despite negative testing in April.     Plan:   RESP  - RA    CVS  - HDS    FENGI  - Low fat, low acid pediatric diet  - IV locked  - Protonix 40mg PO qd  - GI consult    NEURO  - Tylenol 650mg PO q6h PRN    Access  - R. AC   ALEJANDRA GARCES    18yo F PMH of GERD s/p laparoscopic cholecystectomy in Feb 2023 presenting w/ epigastric pain x2 months, admitted for inpatient management of intractable pain. Patient c/p worsening "acid reflux" for the past 2 months. Describes reflux as intermittent, sharp, epigastric pain that extends to back. Symptoms are usually worse in the evening after eating. Nothing seems to relieve the pain. Patient has been seen in the ED 3 times in the past week for the same symptoms. Pt was admitted for similar symptoms 3 weeks ago, when EGD was performed and pt was discharged home on omeprazole. Pt has been taking omeprazole once daily without improvement. Has not followed up with GI outpatient as she said she couldn't obtain an appointment. Endorses loose, green-colored stools daily, intermittent NBNB vomiting 2-3x weekly, and diffuse HA with sore throat x1-2 weeks. Denies fever, chest pain, SOB, rash, dysuria, cough, congestion, or runny nose. No recent travel or change in diet.     PMHx: GERD  PSHx: s/p cholecystectomy (Feb 2023)  Meds: Pepcid qd  All: NKDA   FHx: Mom - asthma, hernia  SHx:   HEADSSS  - Home: Lives at home with parents, brother, and sister. No pets. No smoke exposure.   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, will be entering her second year, lives in the dorms during school year  - Activities: Enjoys hanging out with friends  - Drugs: Has used marijuana and alcohol in the past, no use since cholecystectomy in February   - Sexuality: Has been sexually active with males in the past but not currently, no concerns for STI's  - Suicide/Depression: Denies any SI/HI, has good support systems at home  PMD: Has not established care with PMD  Vaccines: UTD    ED Course: CBCd, CMP, Lipase, Lactate, pepcid x1, pantoprazole x1, carafate x1, NS bolus x1    Review of Systems  Constitutional: (-) fever (+) pain  ENT: (+) sore throat (-) ear pain  (-) nasal discharge (-) congestion  Cardiovascular: (+) chest pain (-) palpitations  Respiratory: (-) SOB (-) cough (-) WOB   GI: (+) abdominal pain (+) nausea (+) vomiting (+) diarrhea (-) constipation  : (-) dysuria (-) hematuria (-) increased frequency (-) increased urgency  Integumentary: (-) rash (-) redness (-) joint pain  Neurological:  (-) dizziness  General: (-) recent travel (-) sick contacts (-) decreased PO (-) urine output     Vital Signs Last 24 Hrs  T(C): 36.9 (17 May 2023 23:25), Max: 36.9 (17 May 2023 23:25)  T(F): 98.4 (17 May 2023 23:25), Max: 98.4 (17 May 2023 23:25)  HR: 63 (17 May 2023 23:25) (63 - 63)  BP: 120/63 (17 May 2023 23:25) (120/63 - 120/63)  RR: 18 (17 May 2023 23:25) (18 - 18)  SpO2: 100% (17 May 2023 23:25) (100% - 100%)    Parameters below as of 17 May 2023 23:25  Patient On (Oxygen Delivery Method): room air    Drug Dosing Weight  Height (cm): 160 (17 May 2023 23:25)  Weight (kg): 108.9 (17 May 2023 23:25)  BMI (kg/m2): 42.5 (17 May 2023 23:25)  BSA (m2): 2.09 (17 May 2023 23:25)    Physical Exam:  GENERAL: obese body habitus, lying comfortably in bed, no acute distress  HEENT: NCAT, conjunctiva clear and not injected, sclera non-icteric, PERRLA, EACs clear, TMs nonbulging/nonerythematous, nares patent, mucous membranes moist, no mucosal lesions, (+) mildly erythematous oropharynx, no tonsillar hypertrophy or exudate, neck supple, no cervical lymphadenopathy  HEART: RRR, S1, S2, no rubs, no murmurs, cap refill <2 seconds  LUNG: CTAB, no wheezing, no ronchi, no crackles  ABDOMEN: exam somewhat limited due to body habitus, +BS, soft, (+) tender to palpation of epigastric region extending to LUQ > RUQ, no hernia    Medications:  MEDICATIONS  (STANDING):  famotidine    Tablet 20 milliGRAM(s) Oral daily    Labs:  CBC Full  -  ( 18 May 2023 05:05 )  WBC Count : 6.97 K/uL  RBC Count : 4.76 M/uL  Hemoglobin : 12.3 g/dL  Hematocrit : 37.9 %  Platelet Count - Automated : 253 K/uL  Mean Cell Volume : 79.6 fL  Mean Cell Hemoglobin : 25.8 pg  Mean Cell Hemoglobin Concentration : 32.5 g/dL  Auto Neutrophil # : 3.84 K/uL  Auto Lymphocyte # : 2.37 K/uL  Auto Monocyte # : 0.70 K/uL  Auto Eosinophil # : 0.03 K/uL  Auto Basophil # : 0.02 K/uL  Auto Neutrophil % : 55.2 %  Auto Lymphocyte % : 34.0 %  Auto Monocyte % : 10.0 %  Auto Eosinophil % : 0.4 %  Auto Basophil % : 0.3 %    Assessment:  18yo F PMH of GERD s/p laparoscopic cholecystectomy in Feb 2023 presenting w/ epigastric pain x2 months, admitted for inpatient management of intractable pain. Afebrile, vitals appropriate for age. PE pertinent for pain to palpation of epigastric region and LUQ > RUQ. Labs grossly unremarkable, normal lipase and liver enzymes. Previous testing over the last couple of days show negative UA/UCx, EKG, and CT abdomen/pelvis. Will obtain UA/UCx as pain does extend around left abdomen to left flank and back, need to r/o UTI or pyelo. Differentials for current presentation include poorly controlled GERD given worsening of pain with eating and location. In the setting of diarrhea and vomiting, differential could include H.pylori despite negative testing in April.     Plan:   RESP  - RA    [facility]S  - HDS    FENGI  - Low fat, low acid pediatric diet  - IV locked  - Protonix 40mg PO qd    NEURO  - Tylenol 650mg PO q6h PRN    Access  - R. AC   ALEJANDRA GARCES    18yo F PMH of GERD s/p laparoscopic cholecystectomy in Feb 2023 presenting w/ epigastric pain x2 months, admitted for inpatient management of intractable pain. Patient c/p worsening "acid reflux" for the past 2 months. Describes reflux as intermittent, sharp, epigastric pain that extends to back. Symptoms are usually worse in the evening after eating. Nothing seems to relieve the pain. Patient has been seen in the ED 3 times in the past week for the same symptoms. Pt was admitted for similar symptoms 3 weeks ago, when EGD was performed and pt was discharged home on omeprazole. Pt has been taking omeprazole once daily without improvement. Has not followed up with GI outpatient as she said she couldn't obtain an appointment. Endorses loose, green-colored stools daily, intermittent NBNB vomiting 2-3x weekly, and diffuse HA with sore throat x1-2 weeks. Denies fever, chest pain, SOB, rash, dysuria, cough, congestion, or runny nose. No recent travel or change in diet.     PMHx: GERD  PSHx: s/p cholecystectomy (Feb 2023)  Meds: omeprazole qd  All: NKDA   FHx: Mom - asthma, hernia  SHx:   HEADSSS  - Home: Lives at home with parents, brother, and sister. No pets. No smoke exposure.   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, will be entering her second year, lives in the dorms during school year  - Activities: Enjoys hanging out with friends  - Drugs: Has used marijuana and alcohol in the past, no use since cholecystectomy in February   - Sexuality: Has been sexually active with males in the past but not currently, no concerns for STI's  - Suicide/Depression: Denies any SI/HI, has good support systems at home  PMD: Has not established care with PMD  Vaccines: UTD    ED Course: CBCd, CMP, Lipase, Lactate, pepcid x1, pantoprazole x1, carafate x1, NS bolus x1    Review of Systems  Constitutional: (-) fever (+) pain  ENT: (+) sore throat (-) ear pain  (-) nasal discharge (-) congestion  Cardiovascular: (+) chest pain (-) palpitations  Respiratory: (-) SOB (-) cough (-) WOB   GI: (+) abdominal pain (+) nausea (+) vomiting (+) diarrhea (-) constipation  : (-) dysuria (-) hematuria (-) increased frequency (-) increased urgency  Integumentary: (-) rash (-) redness (-) joint pain  Neurological:  (-) dizziness  General: (-) recent travel (-) sick contacts (-) decreased PO (-) urine output     Vital Signs Last 24 Hrs  T(C): 36.9 (17 May 2023 23:25), Max: 36.9 (17 May 2023 23:25)  T(F): 98.4 (17 May 2023 23:25), Max: 98.4 (17 May 2023 23:25)  HR: 63 (17 May 2023 23:25) (63 - 63)  BP: 120/63 (17 May 2023 23:25) (120/63 - 120/63)  RR: 18 (17 May 2023 23:25) (18 - 18)  SpO2: 100% (17 May 2023 23:25) (100% - 100%)    Parameters below as of 17 May 2023 23:25  Patient On (Oxygen Delivery Method): room air    Drug Dosing Weight  Height (cm): 160 (17 May 2023 23:25)  Weight (kg): 108.9 (17 May 2023 23:25)  BMI (kg/m2): 42.5 (17 May 2023 23:25)  BSA (m2): 2.09 (17 May 2023 23:25)    Physical Exam:  GENERAL: obese body habitus, lying comfortably in bed, no acute distress  HEENT: NCAT, conjunctiva clear and not injected, sclera non-icteric, PERRLA, EACs clear, TMs nonbulging/nonerythematous, nares patent, mucous membranes moist, no mucosal lesions, (+) mildly erythematous oropharynx, no tonsillar hypertrophy or exudate, neck supple, no cervical lymphadenopathy  HEART: RRR, S1, S2, no rubs, no murmurs, cap refill <2 seconds  LUNG: CTAB, no wheezing, no ronchi, no crackles  ABDOMEN: exam somewhat limited due to body habitus, +BS, soft, (+) tender to palpation of epigastric region extending to LUQ > RUQ, no hernia    Medications:  MEDICATIONS  (STANDING):  famotidine    Tablet 20 milliGRAM(s) Oral daily    Labs:  CBC Full  -  ( 18 May 2023 05:05 )  WBC Count : 6.97 K/uL  RBC Count : 4.76 M/uL  Hemoglobin : 12.3 g/dL  Hematocrit : 37.9 %  Platelet Count - Automated : 253 K/uL  Mean Cell Volume : 79.6 fL  Mean Cell Hemoglobin : 25.8 pg  Mean Cell Hemoglobin Concentration : 32.5 g/dL  Auto Neutrophil # : 3.84 K/uL  Auto Lymphocyte # : 2.37 K/uL  Auto Monocyte # : 0.70 K/uL  Auto Eosinophil # : 0.03 K/uL  Auto Basophil # : 0.02 K/uL  Auto Neutrophil % : 55.2 %  Auto Lymphocyte % : 34.0 %  Auto Monocyte % : 10.0 %  Auto Eosinophil % : 0.4 %  Auto Basophil % : 0.3 %    Assessment:  18yo F PMH of GERD s/p laparoscopic cholecystectomy in Feb 2023 presenting w/ epigastric pain x2 months, admitted for inpatient management of intractable pain. Afebrile, vitals appropriate for age. PE pertinent for pain to palpation of epigastric region and LUQ > RUQ. Labs grossly unremarkable, normal lipase and liver enzymes. Previous testing over the last couple of days show negative UA/UCx, EKG, and CT abdomen/pelvis. Will obtain UA/UCx as pain does extend around left abdomen to left flank and back, need to r/o UTI or pyelo. Differentials for current presentation include poorly controlled GERD given worsening of pain with eating and location. In the setting of diarrhea and vomiting, differential could include H.pylori despite negative testing in April.     Plan:   RESP  - RA    CVS  - HDS    FENGI  - Low fat, low acid pediatric diet  - IV locked  - Protonix 40mg PO qd    NEURO  - Tylenol 650mg PO q6h PRN    Access  - R. AC

## 2023-05-18 NOTE — PATIENT PROFILE PEDIATRIC - HAVE YOU RECEIVED AT LEAST TWO PFIZER AND/OR MODERNA VACCINATIONS (IN ANY COMBINATION) AND/OR ONE JOHNSON & JOHNSON VACCINATION?
1 day PO: Patient is doing well post-operatively. The importance of post-op drop compliance was emphasized. Drop schedule reviewed with patient. Patient to call if any visual changes or concerns. Yes

## 2023-05-18 NOTE — ED PROVIDER NOTE - PROGRESS NOTE DETAILS
TERESA-- Patient reports her pain is not improved after omeprazole, carafate, pepcid.   after extended discussion with patient about her recent repeat visits to the ED and admission for the same symptoms, patient reports she just still feels the same pain and does not feel able to go home.

## 2023-05-19 LAB
APPEARANCE UR: CLEAR — SIGNIFICANT CHANGE UP
BACTERIA # UR AUTO: ABNORMAL
BILIRUB UR-MCNC: NEGATIVE — SIGNIFICANT CHANGE UP
COLOR SPEC: SIGNIFICANT CHANGE UP
CULTURE RESULTS: SIGNIFICANT CHANGE UP
DIFF PNL FLD: NEGATIVE — SIGNIFICANT CHANGE UP
EPI CELLS # UR: 3 /HPF — SIGNIFICANT CHANGE UP (ref 0–5)
GLUCOSE UR QL: NEGATIVE — SIGNIFICANT CHANGE UP
HYALINE CASTS # UR AUTO: 0 /LPF — SIGNIFICANT CHANGE UP (ref 0–7)
KETONES UR-MCNC: NEGATIVE — SIGNIFICANT CHANGE UP
LEUKOCYTE ESTERASE UR-ACNC: ABNORMAL
NITRITE UR-MCNC: NEGATIVE — SIGNIFICANT CHANGE UP
PH UR: 5.5 — SIGNIFICANT CHANGE UP (ref 5–8)
PROT UR-MCNC: SIGNIFICANT CHANGE UP
RBC CASTS # UR COMP ASSIST: 1 /HPF — SIGNIFICANT CHANGE UP (ref 0–4)
SP GR SPEC: 1.03 — SIGNIFICANT CHANGE UP (ref 1.01–1.03)
SPECIMEN SOURCE: SIGNIFICANT CHANGE UP
UROBILINOGEN FLD QL: SIGNIFICANT CHANGE UP
WBC UR QL: 4 /HPF — SIGNIFICANT CHANGE UP (ref 0–5)

## 2023-05-19 PROCEDURE — 99232 SBSQ HOSP IP/OBS MODERATE 35: CPT

## 2023-05-19 PROCEDURE — 99253 IP/OBS CNSLTJ NEW/EST LOW 45: CPT

## 2023-05-19 RX ORDER — CIPROFLOXACIN LACTATE 400MG/40ML
500 VIAL (ML) INTRAVENOUS ONCE
Refills: 0 | Status: DISCONTINUED | OUTPATIENT
Start: 2023-05-19 | End: 2023-05-19

## 2023-05-19 RX ORDER — SODIUM CHLORIDE 9 MG/ML
3 INJECTION INTRAMUSCULAR; INTRAVENOUS; SUBCUTANEOUS EVERY 8 HOURS
Refills: 0 | Status: DISCONTINUED | OUTPATIENT
Start: 2023-05-19 | End: 2023-05-20

## 2023-05-19 RX ORDER — POLYETHYLENE GLYCOL 3350 17 G/17G
17 POWDER, FOR SOLUTION ORAL
Qty: 238 | Refills: 0
Start: 2023-05-19 | End: 2023-06-01

## 2023-05-19 RX ORDER — ACETAMINOPHEN 500 MG
650 TABLET ORAL EVERY 6 HOURS
Refills: 0 | Status: DISCONTINUED | OUTPATIENT
Start: 2023-05-19 | End: 2023-05-20

## 2023-05-19 RX ORDER — ONDANSETRON 8 MG/1
1 TABLET, FILM COATED ORAL
Qty: 9 | Refills: 0
Start: 2023-05-19 | End: 2023-05-21

## 2023-05-19 RX ORDER — ACETAMINOPHEN 500 MG
1000 TABLET ORAL ONCE
Refills: 0 | Status: COMPLETED | OUTPATIENT
Start: 2023-05-19 | End: 2023-05-19

## 2023-05-19 RX ORDER — CIPROFLOXACIN LACTATE 400MG/40ML
1 VIAL (ML) INTRAVENOUS
Qty: 3 | Refills: 0
Start: 2023-05-19 | End: 2023-05-21

## 2023-05-19 RX ADMIN — ONDANSETRON 4 MILLIGRAM(S): 8 TABLET, FILM COATED ORAL at 10:01

## 2023-05-19 RX ADMIN — Medication 650 MILLIGRAM(S): at 04:38

## 2023-05-19 RX ADMIN — Medication 650 MILLIGRAM(S): at 23:46

## 2023-05-19 RX ADMIN — POLYETHYLENE GLYCOL 3350 17 GRAM(S): 17 POWDER, FOR SOLUTION ORAL at 10:02

## 2023-05-19 RX ADMIN — PANTOPRAZOLE SODIUM 40 MILLIGRAM(S): 20 TABLET, DELAYED RELEASE ORAL at 06:05

## 2023-05-19 RX ADMIN — Medication 1000 MILLIGRAM(S): at 15:40

## 2023-05-19 RX ADMIN — Medication 1000 MILLIGRAM(S): at 16:40

## 2023-05-19 RX ADMIN — Medication 650 MILLIGRAM(S): at 04:47

## 2023-05-19 NOTE — DISCHARGE NOTE PROVIDER - PROVIDER TOKENS
PROVIDER:[TOKEN:[[id number]],SCHEDULEDAPPT:[06/01/2023],SCHEDULEDAPPTTIME:[11:30 AM]],PROVIDER:[TOKEN:[[id number]],FOLLOWUP:[1 month]] PROVIDER:[TOKEN:[[id number]],SCHEDULEDAPPT:[06/01/2023],SCHEDULEDAPPTTIME:[11:30 AM]],PROVIDER:[TOKEN:[[id number]],FOLLOWUP:[1 month]],PROVIDER:[TOKEN:[[id number]],FOLLOWUP:[2 weeks]]

## 2023-05-19 NOTE — CONSULT NOTE PEDS - ASSESSMENT
17 year old female with a history of a lap cholecystectomy admitted with epigastric pain.  Recent EGD and abdominal US were negative.   - PPI 40 mg  - low acid, low spicy diet  - avoid dairy products  - follow up as an outpatient

## 2023-05-19 NOTE — DISCHARGE NOTE PROVIDER - CARE PROVIDERS DIRECT ADDRESSES
,[email or web address],DirectAddress_Unknown,DirectAddress_Unknown

## 2023-05-19 NOTE — DISCHARGE NOTE PROVIDER - NSDCMRMEDTOKEN_GEN_ALL_CORE_FT
omeprazole 20 mg oral delayed release capsule: 1 cap(s) orally once a day   MiraLax oral powder for reconstitution: 17 gram(s) orally once a day as needed for  constipation  omeprazole 20 mg oral delayed release capsule: 1 cap(s) orally once a day  ondansetron 4 mg oral tablet, disintegratin tab(s) orally every 8 hours as needed for  nausea

## 2023-05-19 NOTE — DISCHARGE NOTE PROVIDER - NSDCCPCAREPLAN_GEN_ALL_CORE_FT
PRINCIPAL DISCHARGE DIAGNOSIS  Diagnosis: Abdominal pain  Assessment and Plan of Treatment: Plan:  - Follow up with pediatrician in 1-3 days  - Follow up with Peds Gastroenterologist, Dr. Johnston, on June 1, 2023 @ 11:30AM  - Follow up with Bariatric surgeon, Dr. Newell, as discussed  - Medication Instructions  > Continue home medication- Omeprazole 20mg once daily   > Miralax- Take 17g (cap full) once a day as needed for constipation  > Zofran 4mg tablet- Take one tablet every 8 hours as needed for nausea  - Please seek medical attention if your child has persistent fever, has difficulty breathing, has a change in mental status (such as lethargy), cannot tolerate oral intake, or any other worrying signs or symptoms.     PRINCIPAL DISCHARGE DIAGNOSIS  Diagnosis: Abdominal pain  Assessment and Plan of Treatment: Plan:  - Follow up with pediatrician in 1-3 days  - Follow up with Peds Gastroenterologist, Dr. Johnston, on June 1, 2023 @ 11:30AM  - Follow up with Cardiologist, Dr. Echeverria, outpatient for AV block on last 2 ekgs, in 2 weeks   - Follow up with Bariatric surgeon, Dr. Newell, as discussed  - Medication Instructions  > Continue home medication- Omeprazole 20mg once daily   > Miralax- Take 17g (cap full) once a day as needed for constipation  > Zofran 4mg tablet- Take one tablet every 8 hours as needed for nausea  - Please seek medical attention if your child has persistent fever, has difficulty breathing, has a change in mental status (such as lethargy), cannot tolerate oral intake, or any other worrying signs or symptoms.

## 2023-05-19 NOTE — CONSULT NOTE ADULT - SUBJECTIVE AND OBJECTIVE BOX
16 yo female with class 3 obesity who has struggled with her weight for her entire life. She has tried countless diets and exercise plans without sustained success. She also struggles with severe GERD and underwent an EGD last admission which was grossly normal. She was admitted this time with epigastric pain but this has resolved. Bariatric surgery was consulted as an inpatient to begin the discussion of having bariatric surgery and arrange outpatient follow up.       PAST MEDICAL & SURGICAL HISTORY:  GERD (gastroesophageal reflux disease)  S/P cholecystectomy    SOCIAL:   Nonsmoker; nondrinker  In school for biology; pre-med- wants to be a pediatrician             Vitals:   T(F): 98.4 (23 @ 15:44), Max: 99.3 (23 @ 11:33)  HR: 71 (23 @ 15:44)  BP: 118/61 (23 @ 15:44)  RR: 18 (23 @ 15:44)  SpO2: 100% (23 @ 15:44)      PHYSICAL EXAM:  General Appearance: NAD  Heart: s1, s2,    Lungs: No increased work of breathing.  Abdomen:  Soft, nontender, nondistended.  MSK/Extremities: Warm & well-perfused.   Skin: Warm, dry    MEDICATIONS  (STANDING):  pantoprazole    Tablet 40 milliGRAM(s) Oral before breakfast  polyethylene glycol 3350 17 Gram(s) Oral daily  sodium chloride 0.9% lock flush 3 milliLiter(s) IV Push every 8 hours    MEDICATIONS  (PRN):  acetaminophen     Tablet .. 650 milliGRAM(s) Oral every 6 hours PRN Moderate Pain (4 - 6)  ondansetron   Disintegrating Tablet 4 milliGRAM(s) Oral every 8 hours PRN Nausea and/or Vomiting            LAB/STUDIES:  Labs:  CAPILLARY BLOOD GLUCOSE                              12.3   6.97  )-----------( 253      ( 18 May 2023 05:05 )             37.9             142  |  106  |  9<L>  ----------------------------<  81  4.0   |  26  |  0.9          LFTs:             6.8  | 0.4  | 10       ------------------[83      ( 18 May 2023 05:05 )  4.1  | x    | 11          Lipase:22     Amylase:x         Lactate, Blood: 0.9 mmol/L (23 @ 05:05)        Urinalysis Basic - ( 19 May 2023 00:00 )    Color: Light Yellow / Appearance: Clear / S.027 / pH: x  Gluc: x / Ketone: Negative  / Bili: Negative / Urobili: <2 mg/dL   Blood: x / Protein: Trace / Nitrite: Negative   Leuk Esterase: Small / RBC: 1 /HPF / WBC 4 /HPF   Sq Epi: x / Non Sq Epi: x / Bacteria: Few

## 2023-05-19 NOTE — DISCHARGE NOTE PROVIDER - CARE PROVIDER_API CALL
Nya Johnston)  Pediatric Gastroenterology  [facility], [facility] [place] [address]  Phone: [phone]  Fax: [phone]  Scheduled Appointment: 06/01/2023 11:30 AM    Jessica Newell)  Surgery  [address] [place] [address]  Phone: [phone]  Fax: [phone]  Follow Up Time: 1 month   Nya Johnston)  Pediatric Gastroenterology  [facility], [address] [place] [address]  Phone: [phone]  Fax: [phone]  Scheduled Appointment: 06/01/2023 11:30 AM    Jessica Newell)  Surgery  [address] [place] [address]  Phone: [phone]  Fax: [phone]  Follow Up Time: 1 month    Gerardo Echeverria)  Pediatric Cardiology; Pediatrics  [address] [place] [address]  Phone: [phone]  Fax: [phone]  Follow Up Time: 2 weeks

## 2023-05-19 NOTE — DISCHARGE NOTE PROVIDER - HOSPITAL COURSE
One Liner: 18yo F PMH of GERD s/p laparoscopic cholecystectomy in Feb 2023 presenting w/ epigastric pain x2 months, admitted for inpatient management of intractable pain.    ED Course: CBCd, CMP, Lipase, Lactate, pepcid x1, pantoprazole x1, carafate x1, NS bolus x1    Inpatient Course:   Pt was admitted to the inpatient floor on 5/18/23. Vitals and clinical status stable on discharge.     Resp: Patient remained stable on room air throughout hospital course.     CVS: Patient remained hemodynamically stable throughout hospital course.     FEN/GI: Patient tolerated a regular diet. Intermittently complained of epigastric abdominal pain and back pain. Patient was started on protonix, miralax, and zofran as needed. Tylenol was also given as needed for pain. UCx showed ______. GI PCR was ______.     ID: Throat strep culture was negative.     Labs and Radiology:  Complete Blood Count + Automated Diff (05.18.23 @ 05:05)    WBC Count: 6.97 K/uL   RBC Count: 4.76 M/uL   Hemoglobin: 12.3 g/dL   Hematocrit: 37.9 %   Mean Cell Volume: 79.6 fL   Mean Cell Hemoglobin: 25.8 pg   Mean Cell Hemoglobin Conc: 32.5 g/dL   Red Cell Distrib Width: 14.3 %   Platelet Count - Automated: 253 K/uL   MPV: 12.1 fL   Auto Neutrophil #: 3.84 K/uL   Auto Lymphocyte #: 2.37 K/uL   Auto Monocyte #: 0.70 K/uL   Auto Eosinophil #: 0.03 K/uL   Auto Basophil #: 0.02 K/uL   Auto Neutrophil %: 55.2: Differential percentages must be correlated with absolute numbers for  clinical significance. %   Auto Lymphocyte %: 34.0 %   Auto Monocyte %: 10.0 %   Auto Eosinophil %: 0.4 %   Auto Basophil %: 0.3 %   Auto Immature Granulocyte %: 0.1: (Includes meta, myelo and promyelocytes). Mild elevations in immature granulocytes may be seen with many inflammatory processes and pregnancy;  clinical correlation suggested. %   Nucleated RBC: 0 /100 WBCs    Urinalysis (05.19.23 @ 00:00)    Glucose Qualitative, Urine: Negative   Blood, Urine: Negative   pH Urine: 5.5   Color: Light Yellow   Urine Appearance: Clear   Bilirubin: Negative   Ketone - Urine: Negative   Specific Gravity: 1.027   Protein, Urine: Trace   Urobilinogen: <2 mg/dL   Nitrite: Negative   Leukocyte Esterase Concentration: Small    Strep Throat by PCR (05.18.23 @ 11:30)    Strep Throat by PCR: NotDetec: Strep Throat PCR assay is a Real-Time PCR test for the qualitative detection and differentiation of Group A Streptococcus and pyogenic Group C/G Beta-hemolytic Streptococcus on throat swabs. The result should be interpreted with consideration of all clinical and laboratory findings.    Discharge Vitals and Physical Exam:      Plan:  - Follow up with pediatrician in 1-3 days  - Medication Instructions  > Continue protonix 40mg once daily      One Liner: 16yo F PMH of GERD s/p laparoscopic cholecystectomy in Feb 2023 presenting w/ epigastric pain x2 months, admitted for inpatient management of intractable pain.    ED Course: CBCd, CMP, Lipase, Lactate, pepcid x1, pantoprazole x1, carafate x1, NS bolus x1    Inpatient Course:   Pt was admitted to the inpatient floor on 5/18/23. Vitals and clinical status stable on discharge.     Resp: Patient remained stable on room air throughout hospital course.     CVS: Patient remained hemodynamically stable throughout hospital course.     FEN/GI: Patient tolerated a low fat, low acid pediatric diet. Intermittently complained of epigastric abdominal pain and back pain. Patient was started on protonix, miralax, and zofran as needed. Tylenol was also given as needed for pain. UCx was collected and results are pending upon discharge.    ID: Throat strep culture was negative.     Labs and Radiology:  Complete Blood Count + Automated Diff (05.18.23 @ 05:05)    WBC Count: 6.97 K/uL   RBC Count: 4.76 M/uL   Hemoglobin: 12.3 g/dL   Hematocrit: 37.9 %   Mean Cell Volume: 79.6 fL   Mean Cell Hemoglobin: 25.8 pg   Mean Cell Hemoglobin Conc: 32.5 g/dL   Red Cell Distrib Width: 14.3 %   Platelet Count - Automated: 253 K/uL   MPV: 12.1 fL   Auto Neutrophil #: 3.84 K/uL   Auto Lymphocyte #: 2.37 K/uL   Auto Monocyte #: 0.70 K/uL   Auto Eosinophil #: 0.03 K/uL   Auto Basophil #: 0.02 K/uL   Auto Neutrophil %: 55.2: Differential percentages must be correlated with absolute numbers for  clinical significance. %   Auto Lymphocyte %: 34.0 %   Auto Monocyte %: 10.0 %   Auto Eosinophil %: 0.4 %   Auto Basophil %: 0.3 %   Auto Immature Granulocyte %: 0.1: (Includes meta, myelo and promyelocytes). Mild elevations in immature granulocytes may be seen with many inflammatory processes and pregnancy;  clinical correlation suggested. %   Nucleated RBC: 0 /100 WBCs    Urinalysis (05.19.23 @ 00:00)    Glucose Qualitative, Urine: Negative   Blood, Urine: Negative   pH Urine: 5.5   Color: Light Yellow   Urine Appearance: Clear   Bilirubin: Negative   Ketone - Urine: Negative   Specific Gravity: 1.027   Protein, Urine: Trace   Urobilinogen: <2 mg/dL   Nitrite: Negative   Leukocyte Esterase Concentration: Small    Strep Throat by PCR (05.18.23 @ 11:30)    Strep Throat by PCR: Elisabettec: Strep Throat PCR assay is a Real-Time PCR test for the qualitative detection and differentiation of Group A Streptococcus and pyogenic Group C/G Beta-hemolytic Streptococcus on throat swabs. The result should be interpreted with consideration of all clinical and laboratory findings.    Discharge Vitals and Physical Exam:  Vital Signs Last 24 Hrs  T(C): 37.4 (19 May 2023 11:33), Max: 37.4 (19 May 2023 11:33)  T(F): 99.3 (19 May 2023 11:33), Max: 99.3 (19 May 2023 11:33)  HR: 77 (19 May 2023 11:33) (60 - 78)  BP: 97/57 (19 May 2023 11:33) (91/54 - 114/56)  BP(mean): 72 (19 May 2023 11:33) (65 - 81)  RR: 20 (19 May 2023 11:33) (18 - 22)  SpO2: 99% (19 May 2023 11:33) (98% - 100%)    Parameters below as of 19 May 2023 11:33  Patient On (Oxygen Delivery Method): room air    VS reviewed, stable.  Gen: patient is awake, interactive, well appearing  HEENT: NC/AT, PERRL, no conjunctivitis or scleral icterus; no nasal discharge or congestion, moist mucous membranes  Chest: CTAB, no crackles/wheezes, good air entry, no tachypnea or retractions  CV: regular rate and rhythm, no murmurs   Abd: soft, no HSM appreciated, +BS, tenderness to palpation of the epigastric area    Plan:  - Follow up with pediatrician in 1-3 days  - Follow up with Peds Gastroenterologist, Dr. Johnston, on June 1, 2023 @ 11:30AM  - Follow up with Bariatric surgeon, Dr. Newell, as discussed  - Medication Instructions  > Continue home medication- Omeprazole 20mg once daily   > Miralax- Take 17g (cap full) once a day as needed for constipation  > Zofran 4mg tablet- Take one tablet every 8 hours as needed for nausea  - Please seek medical attention if your child has persistent fever, has difficulty breathing, has a change in mental status (such as lethargy), cannot tolerate oral intake, or any other worrying signs or symptoms.     One Liner: 18yo F PMH of GERD s/p laparoscopic cholecystectomy in Feb 2023 presenting w/ epigastric pain x2 months, admitted for inpatient management of intractable pain.    ED Course: CBCd, CMP, Lipase, Lactate, pepcid x1, pantoprazole x1, carafate x1, NS bolus x1    Inpatient Course:   Pt was admitted to the inpatient floor on 5/18/23. Vitals and clinical status stable on discharge.     Resp: Patient remained stable on room air throughout hospital course.     CVS: Patient remained hemodynamically stable throughout hospital course.     FEN/GI: Patient tolerated a low fat, low acid pediatric diet. Intermittently complained of epigastric abdominal pain and back pain. Patient was started on protonix, miralax, and zofran as needed. Tylenol was also given as needed for pain. UCx was collected and results are pending upon discharge.    ID: Throat strep culture was negative. Urine culture negative.     Labs and Radiology:  Complete Blood Count + Automated Diff (05.18.23 @ 05:05)    WBC Count: 6.97 K/uL   RBC Count: 4.76 M/uL   Hemoglobin: 12.3 g/dL   Hematocrit: 37.9 %   Mean Cell Volume: 79.6 fL   Mean Cell Hemoglobin: 25.8 pg   Mean Cell Hemoglobin Conc: 32.5 g/dL   Red Cell Distrib Width: 14.3 %   Platelet Count - Automated: 253 K/uL   MPV: 12.1 fL   Auto Neutrophil #: 3.84 K/uL   Auto Lymphocyte #: 2.37 K/uL   Auto Monocyte #: 0.70 K/uL   Auto Eosinophil #: 0.03 K/uL   Auto Basophil #: 0.02 K/uL   Auto Neutrophil %: 55.2: Differential percentages must be correlated with absolute numbers for  clinical significance. %   Auto Lymphocyte %: 34.0 %   Auto Monocyte %: 10.0 %   Auto Eosinophil %: 0.4 %   Auto Basophil %: 0.3 %   Auto Immature Granulocyte %: 0.1: (Includes meta, myelo and promyelocytes). Mild elevations in immature granulocytes may be seen with many inflammatory processes and pregnancy;  clinical correlation suggested. %   Nucleated RBC: 0 /100 WBCs    Urinalysis (05.19.23 @ 00:00)    Glucose Qualitative, Urine: Negative   Blood, Urine: Negative   pH Urine: 5.5   Color: Light Yellow   Urine Appearance: Clear   Bilirubin: Negative   Ketone - Urine: Negative   Specific Gravity: 1.027   Protein, Urine: Trace   Urobilinogen: <2 mg/dL   Nitrite: Negative   Leukocyte Esterase Concentration: Small    Culture - Urine (05.18.23 @ 12:50)    Specimen Source: Clean Catch Clean Catch (Midstream)   Culture Results:   <10,000 CFU/mL Normal Urogenital Priscilla    Strep Throat by PCR (05.18.23 @ 11:30)    Strep Throat by PCR: NotDetec: Strep Throat PCR assay is a Real-Time PCR test for the qualitative detection and differentiation of Group A Streptococcus and pyogenic Group C/G Beta-hemolytic Streptococcus on throat swabs. The result should be interpreted with consideration of all clinical and laboratory findings.    Discharge Vitals and Physical Exam:  Vital Signs Last 24 Hrs  T(C): 36.3 (20 May 2023 08:20), Max: 37.4 (19 May 2023 11:33)  T(F): 97.4 (20 May 2023 08:20), Max: 99.3 (19 May 2023 11:33)  HR: 73 (20 May 2023 08:20) (64 - 77)  BP: 101/50 (20 May 2023 08:20) (97/57 - 118/62)  BP(mean): 72 (20 May 2023 08:20) (62 - 88)  RR: 20 (20 May 2023 08:20) (18 - 20)  SpO2: 100% (20 May 2023 08:20) (99% - 100%)    Parameters below as of 20 May 2023 08:20  Patient On (Oxygen Delivery Method): room air    VS reviewed, stable.  Gen: patient is awake, interactive, well appearing  HEENT: NC/AT, PERRL, no conjunctivitis or scleral icterus; no nasal discharge or congestion, moist mucous membranes  Chest: CTAB, no crackles/wheezes, good air entry, no tachypnea or retractions  CV: regular rate and rhythm, no murmurs   Abd: soft, no HSM appreciated, +BS, tenderness to palpation of the epigastric area    Plan:  - Follow up with pediatrician in 1-3 days  - Follow up with Peds Gastroenterologist, Dr. Johnston, on June 1, 2023 @ 11:30AM  - Follow up with Bariatric surgeon, Dr. Newell, as discussed  - Follow up with Cardiologist, Dr. Echeverria, outpatient for AV block on last 2 ekgs, in 2 weeks     - Medication Instructions  > Continue home medication- Omeprazole 20mg once daily   > Miralax- Take 17g (cap full) once a day as needed for constipation  > Zofran 4mg tablet- Take one tablet every 8 hours as needed for nausea  - Please seek medical attention if your child has persistent fever, has difficulty breathing, has a change in mental status (such as lethargy), cannot tolerate oral intake, or any other worrying signs or symptoms.     One Liner: 16yo F PMH of GERD s/p laparoscopic cholecystectomy in Feb 2023 presenting w/ epigastric pain x2 months, admitted for inpatient management of intractable pain.    ED Course: CBCd, CMP, Lipase, Lactate, pepcid x1, pantoprazole x1, carafate x1, NS bolus x1    Inpatient Course:   Pt was admitted to the inpatient floor on 5/18/23. Vitals and clinical status stable on discharge.     Resp: Patient remained stable on room air throughout hospital course.     CVS: Patient remained hemodynamically stable throughout hospital course.     FEN/GI: Patient tolerated a low fat, low acid pediatric diet. Intermittently complained of epigastric abdominal pain and back pain. Patient was started on protonix, miralax, and zofran as needed. Tylenol was also given as needed for pain. UCx was collected and results are pending upon discharge.    ID: Throat strep culture was negative. Urine culture negative.     Labs and Radiology:  Complete Blood Count + Automated Diff (05.18.23 @ 05:05)    WBC Count: 6.97 K/uL   RBC Count: 4.76 M/uL   Hemoglobin: 12.3 g/dL   Hematocrit: 37.9 %   Mean Cell Volume: 79.6 fL   Mean Cell Hemoglobin: 25.8 pg   Mean Cell Hemoglobin Conc: 32.5 g/dL   Red Cell Distrib Width: 14.3 %   Platelet Count - Automated: 253 K/uL   MPV: 12.1 fL   Auto Neutrophil #: 3.84 K/uL   Auto Lymphocyte #: 2.37 K/uL   Auto Monocyte #: 0.70 K/uL   Auto Eosinophil #: 0.03 K/uL   Auto Basophil #: 0.02 K/uL   Auto Neutrophil %: 55.2: Differential percentages must be correlated with absolute numbers for  clinical significance. %   Auto Lymphocyte %: 34.0 %   Auto Monocyte %: 10.0 %   Auto Eosinophil %: 0.4 %   Auto Basophil %: 0.3 %   Auto Immature Granulocyte %: 0.1: (Includes meta, myelo and promyelocytes). Mild elevations in immature granulocytes may be seen with many inflammatory processes and pregnancy;  clinical correlation suggested. %   Nucleated RBC: 0 /100 WBCs    Urinalysis (05.19.23 @ 00:00)    Glucose Qualitative, Urine: Negative   Blood, Urine: Negative   pH Urine: 5.5   Color: Light Yellow   Urine Appearance: Clear   Bilirubin: Negative   Ketone - Urine: Negative   Specific Gravity: 1.027   Protein, Urine: Trace   Urobilinogen: <2 mg/dL   Nitrite: Negative   Leukocyte Esterase Concentration: Small    Culture - Urine (05.18.23 @ 12:50)    Specimen Source: Clean Catch Clean Catch (Midstream)   Culture Results:   <10,000 CFU/mL Normal Urogenital Priscilla    Strep Throat by PCR (05.18.23 @ 11:30)    Strep Throat by PCR: NotDetec: Strep Throat PCR assay is a Real-Time PCR test for the qualitative detection and differentiation of Group A Streptococcus and pyogenic Group C/G Beta-hemolytic Streptococcus on throat swabs. The result should be interpreted with consideration of all clinical and laboratory findings.    Discharge Vitals and Physical Exam:  Vital Signs Last 24 Hrs  T(C): 36.3 (20 May 2023 08:20), Max: 37.4 (19 May 2023 11:33)  T(F): 97.4 (20 May 2023 08:20), Max: 99.3 (19 May 2023 11:33)  HR: 73 (20 May 2023 08:20) (64 - 77)  BP: 101/50 (20 May 2023 08:20) (97/57 - 118/62)  BP(mean): 72 (20 May 2023 08:20) (62 - 88)  RR: 20 (20 May 2023 08:20) (18 - 20)  SpO2: 100% (20 May 2023 08:20) (99% - 100%)    Parameters below as of 20 May 2023 08:20  Patient On (Oxygen Delivery Method): room air    VS reviewed, stable.  Gen: patient is awake, interactive, well appearing  HEENT: NC/AT, PERRL, no conjunctivitis or scleral icterus; no nasal discharge or congestion, moist mucous membranes  Chest: CTAB, no crackles/wheezes, good air entry, no tachypnea or retractions  CV: regular rate and rhythm, no murmurs   Abd: soft, no HSM appreciated, +BS, tenderness to palpation of the epigastric area    Plan:  - Follow up with pediatrician in 1-3 days  - Follow up with Peds Gastroenterologist, Dr. Johnston, on June 1, 2023 @ 11:30AM  - Follow up with Bariatric surgeon, Dr. Newell, as discussed  - Follow up with Cardiologist, Dr. Echeverria, outpatient for AV block on last 2 ekgs, in 2 weeks     - Medication Instructions  > Continue home medication- Omeprazole 20mg once daily   > Miralax- Take 17g (cap full) once a day as needed for constipation  > Zofran 4mg tablet- Take one tablet every 8 hours as needed for nausea  > Avoid NSAIDs  - Please seek medical attention if your child has persistent fever, has difficulty breathing, has a change in mental status (such as lethargy), cannot tolerate oral intake, or any other worrying signs or symptoms.

## 2023-05-19 NOTE — CONSULT NOTE ADULT - ASSESSMENT
A/P:   17y F with class 3 obesity and GERD  Follow up in my office, [address], for a formal consultation upon discharge. Will email her an intake form and then schedule following receipt of this information. No inpatient bariatric surgery intervention prior to discharge. Thank you for this consult.     Jessica Newell MD

## 2023-05-19 NOTE — CONSULT NOTE PEDS - SUBJECTIVE AND OBJECTIVE BOX
17y    No Known Allergies      Abdominal pain    No pertinent family history in first degree relatives    Handoff    MEWS Score    No pertinent past medical history    GERD (gastroesophageal reflux disease)    Abdominal pain    No significant past surgical history    No significant past surgical history    S/P cholecystectomy    ACCID REFLEX    5    SysAdmin_VisitLink            acetaminophen     Tablet .. 650 milliGRAM(s) Oral every 6 hours PRN  ondansetron   Disintegrating Tablet 4 milliGRAM(s) Oral every 8 hours PRN  pantoprazole    Tablet 40 milliGRAM(s) Oral before breakfast  polyethylene glycol 3350 17 Gram(s) Oral daily  sodium chloride 0.9% lock flush 3 milliLiter(s) IV Push every 8 hours      FAMILY HISTORY:      HPI:  ALEJANDRA GARCES    16yo F PMH of GERD s/p laparoscopic cholecystectomy in Feb 2023 presenting w/ epigastric pain x2 months, admitted for inpatient management of intractable pain. Patient c/p worsening "acid reflux" for the past 2 months. Describes reflux as intermittent, sharp, epigastric pain that extends to back. Symptoms are usually worse in the evening after eating. Nothing seems to relieve the pain. Patient has been seen in the ED 3 times in the past week for the same symptoms. Pt was admitted for similar symptoms 3 weeks ago, when EGD was performed and pt was discharged home on omeprazole. Pt has been taking omeprazole once daily without improvement. Has not followed up with GI outpatient as she said she couldn't obtain an appointment. Endorses loose, green-colored stools daily, intermittent NBNB vomiting 2-3x weekly, and diffuse HA with sore throat x1-2 weeks. Denies fever, chest pain, SOB, rash, dysuria, cough, congestion, or runny nose. No recent travel or change in diet.     PMHx: GERD  PSHx: s/p cholecystectomy (Feb 2023)  Meds: Pepcid qd  All: NKDA   FHx: Mom - asthma, hernia  SHx:   HEADSSS  - Home: Lives at home with parents, brother, and sister. No pets. No smoke exposure.   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, will be entering her second year, lives in the dorms during school year  - Activities: Enjoys hanging out with friends  - Drugs: Has used marijuana and alcohol in the past, no use since cholecystectomy in February   - Sexuality: Has been sexually active with males in the past but not currently, no concerns for STI's  - Suicide/Depression: Denies any SI/HI, has good support systems at home  PMD: Has not established care with PMD  Vaccines: UTD    ED Course: CBCd, CMP, Lipase, Lactate, pepcid x1, pantoprazole x1, carafate x1, NS bolus x1    Review of Systems  Constitutional: (-) fever (+) pain  ENT: (+) sore throat (-) ear pain  (-) nasal discharge (-) congestion  Cardiovascular: (+) chest pain (-) palpitations  Respiratory: (-) SOB (-) cough (-) WOB   GI: (+) abdominal pain (+) nausea (+) vomiting (+) diarrhea (-) constipation  : (-) dysuria (-) hematuria (-) increased frequency (-) increased urgency  Integumentary: (-) rash (-) redness (-) joint pain  Neurological:  (-) dizziness  General: (-) recent travel (-) sick contacts (-) decreased PO (-) urine output     Vital Signs Last 24 Hrs  T(C): 36.9 (17 May 2023 23:25), Max: 36.9 (17 May 2023 23:25)  T(F): 98.4 (17 May 2023 23:25), Max: 98.4 (17 May 2023 23:25)  HR: 63 (17 May 2023 23:25) (63 - 63)  BP: 120/63 (17 May 2023 23:25) (120/63 - 120/63)  RR: 18 (17 May 2023 23:25) (18 - 18)  SpO2: 100% (17 May 2023 23:25) (100% - 100%)    Parameters below as of 17 May 2023 23:25  Patient On (Oxygen Delivery Method): room air    Drug Dosing Weight  Height (cm): 160 (17 May 2023 23:25)  Weight (kg): 108.9 (17 May 2023 23:25)  BMI (kg/m2): 42.5 (17 May 2023 23:25)  BSA (m2): 2.09 (17 May 2023 23:25)    Medications:  MEDICATIONS  (STANDING):  famotidine    Tablet 20 milliGRAM(s) Oral daily    Labs:  CBC Full  -  ( 18 May 2023 05:05 )  WBC Count : 6.97 K/uL  RBC Count : 4.76 M/uL  Hemoglobin : gdL  Hematocrit : 37.9 %  Platelet Count - Automated : 253 K/uL  Mean Cell Volume : 79.6 fL  Mean Cell Hemoglobin : 25.8 pg  Mean Cell Hemoglobin Concentration : 32.5 g/dL  Auto Neutrophil # : 3.84 K/uL  Auto Lymphocyte # : 2.37 K/uL  Auto Monocyte # : 0.70 K/uL  Auto Eosinophil # : 0.03 K/uL  Auto Basophil # : 0.02 K/uL  Auto Neutrophil % : 55.2 %  Auto Lymphocyte % : 34.0 %  Auto Monocyte % : 10.0 %  Auto Eosinophil % : 0.4 %  Auto Basophil % : 0.3 %    Assessment:  16yo F PMH of GERD s/p laparoscopic cholecystectomy in Feb 2023 presenting w/ epigastric pain x2 months, admitted for inpatient management of intractable pain. Afebrile, vitals appropriate for age. PE pertinent for pain to palpation of epigastric region and LUQ > RUQ. Labs grossly unremarkable, normal lipase and liver enzymes. Previous testing over the last couple of days show negative UA/UCx, EKG, and CT abdomen/pelvis. Will obtain UA/UCx as pain does extend around left abdomen to left flank and back, need to r/o UTI or pyelo. Differentials for current presentation include poorly controlled GERD given worsening of pain with eating and location. In the setting of diarrhea and vomiting, differential could include H.pylori despite negative testing in April.     Ramu  - R. TERESA   (18 May 2023 05:43)    Physical Exam  Gen: NAD  HEENT: NC/AT, Mucosal Membranes  Cardio: S1/S2 No S3/S4, Regular  Resp: CTA B/L  Abdomen: Soft, ND/NT, mild epigastric tenderness to palpation  Neuro: AAOx3, Cranial Nerve II-XII intact   Extremities: FROM x 4

## 2023-05-19 NOTE — DISCHARGE NOTE PROVIDER - NSDCFUSCHEDAPPT_GEN_ALL_CORE_FT
Nya Johnston  [facility]  [facility] [address]  Scheduled Appointment: 06/01/2023

## 2023-05-19 NOTE — PROGRESS NOTE PEDS - ATTENDING COMMENTS
Pt seen on rounds, agree with resident plan of care.17yrs.F s/p Lap Anne, s/p EGD (wnl), follow by GI. On PPI. Admitted with intermittent abdominal pain. H/o constipation. Urine c/s < 10K.Continue PPI as per GI recs. PE wnl except mild discomfort Mild anemia. Pt seen on rounds, agree with resident plan of care.17yrs.F s/p Lap Anne, s/p EGD (wnl), followed by GI. On PPI. Admitted with intermittent abdominal pain. H/o constipation. Urine c/s < 10K.Continue PPI as per GI recs. PE wnl except mild discomfort Mild anemia. Follow up with cardiology for 1st degree heart block. CVS:RRR, no murmurs no arrythmia. Chest-CTA, Abdomen mild epigastric discomfort. Continue current care. Appreciate Bariatric surgery consult.

## 2023-05-19 NOTE — CHART NOTE - NSCHARTNOTEFT_GEN_A_CORE
Diet education    Met with pt at bedside to provide diet education on GERD and acid reflux. provided pt with a list of foods to avoid to prevent reoccurrence of symptoms. Pt reports that she typically eats 2 meals per day:  Cereal with low fat milk at breakfast and plain grilled chicken with brown rice for dinner and sometimes saltine crackers as a snack in between breakfast and dinner. Pt will sometimes eat half her portion of dinner and finish the rest a little later in the evening but usually does not snack after dinner. Pt reports only drinking water or cranberry juice as her beverage's; no soda or caffeine. Advised pt to try eating small frequent meals throughout the day to avoid going longer than 3 hours between meals/snacks and avoid eating <3 hours before going to sleep to see if she feels an improvement in her symptoms. Pt denies normally eating any spicy, fried, or acidic foods and states that she cannot pinpoint a specific time of day or food that is worsening her symptoms. Writer answered all of pt's questions and provided an education handout. RD to remain available prn.    Jessica Anthony RD  #[id number] or via TEAMS

## 2023-05-20 ENCOUNTER — TRANSCRIPTION ENCOUNTER (OUTPATIENT)
Age: 18
End: 2023-05-20

## 2023-05-20 VITALS
RESPIRATION RATE: 20 BRPM | HEART RATE: 62 BPM | TEMPERATURE: 98 F | OXYGEN SATURATION: 100 % | SYSTOLIC BLOOD PRESSURE: 109 MMHG | DIASTOLIC BLOOD PRESSURE: 54 MMHG

## 2023-05-20 PROCEDURE — 99233 SBSQ HOSP IP/OBS HIGH 50: CPT

## 2023-05-20 RX ADMIN — PANTOPRAZOLE SODIUM 40 MILLIGRAM(S): 20 TABLET, DELAYED RELEASE ORAL at 07:08

## 2023-05-20 RX ADMIN — Medication 650 MILLIGRAM(S): at 00:00

## 2023-05-20 NOTE — PROGRESS NOTE PEDS - ASSESSMENT
17 year old female with a history of lap cholecystectomy admitted with epigastric pain.  Recent EGD and abdominal US were negative.  She is taking omeprazole 40 mg daily  She has not had a stool in the past several days. She was begun on Miralax 1 cap daily  - no acid, no spicy diet  - avoid dairy products  - continue omeprazole 40 mg daily  - no eating or drinking 3-4 hours before bed  - continue Mkiralax 1 cap daily  - will nadia as an outpatinet

## 2023-05-20 NOTE — DISCHARGE NOTE NURSING/CASE MANAGEMENT/SOCIAL WORK - PATIENT PORTAL LINK FT
You can access the FollowMyHealth Patient Portal offered by [facility] by registering at the following website: [email or web address]. By joining [employer]’s FollowMyHealth portal, you will also be able to view your health information using other applications (apps) compatible with our system.

## 2023-05-20 NOTE — DISCHARGE NOTE NURSING/CASE MANAGEMENT/SOCIAL WORK - NSDCPEFALRISK_GEN_ALL_CORE
For information on Fall & Injury Prevention, visit: [email or web address] OR  [email or web address] OR  https://www.cdc.gov/steadi/patient.html

## 2023-05-20 NOTE — PROGRESS NOTE PEDS - SUBJECTIVE AND OBJECTIVE BOX
ALEJANDRA GARCES    S/O:    Patient reported pleuritic chest pain overnight. Additionally, patient reported having abdominal pain at 5:30 am, however patient denied Motrin.     Vital Signs  Vital Signs Last 24 Hrs  T(C): 36.2 (19 May 2023 07:30), Max: 36.8 (18 May 2023 15:32)  T(F): 97.1 (19 May 2023 07:30), Max: 98.2 (18 May 2023 15:32)  HR: 63 (19 May 2023 07:30) (60 - 78)  BP: 106/51 (19 May 2023 05:44) (91/54 - 114/56)  BP(mean): 73 (19 May 2023 05:44) (65 - 81)  RR: 18 (19 May 2023 07:30) (18 - 22)  SpO2: 100% (19 May 2023 07:30) (98% - 100%)    Parameters below as of 19 May 2023 07:30  Patient On (Oxygen Delivery Method): room air        I&O's Summary    18 May 2023 07:01  -  19 May 2023 07:00  --------------------------------------------------------  IN: 1200 mL / OUT: 450 mL / NET: 750 mL        Medications and Allergies:  MEDICATIONS  (STANDING):  pantoprazole    Tablet 40 milliGRAM(s) Oral before breakfast  polyethylene glycol 3350 17 Gram(s) Oral daily    MEDICATIONS  (PRN):  acetaminophen     Tablet .. 650 milliGRAM(s) Oral every 6 hours PRN Moderate Pain (4 - 6)  ondansetron   Disintegrating Tablet 4 milliGRAM(s) Oral every 8 hours PRN Nausea and/or Vomiting    Allergies    No Known Allergies    Intolerances        Interval Labs:  -    142  |  106  |  9<L>  ----------------------------<  81  4.0   |  26  |  0.9    Ca    9.4      18 May 2023 05:05    TPro  6.8  /  Alb  4.1  /  TBili  0.4  /  DBili  x   /  AST  10<L>  /  ALT  11<L>  /  AlkPhos  83  -                          12.3   6.97  )-----------( 253      ( 18 May 2023 05:05 )             37.9       Urinalysis Basic - ( 19 May 2023 00:00 )    Color: Light Yellow / Appearance: Clear / S.027 / pH: x  Gluc: x / Ketone: Negative  / Bili: Negative / Urobili: <2 mg/dL   Blood: x / Protein: Trace / Nitrite: Negative   Leuk Esterase: Small / RBC: 1 /HPF / WBC 4 /HPF   Sq Epi: x / Non Sq Epi: x / Bacteria: Few      Physical Exam:  I examined the patient at approximately 8:30 AM  VS reviewed, stable.  Gen: patient is awake, interactive, well appearing  HEENT: NC/AT, PERRL, no conjunctivitis or scleral icterus; no nasal discharge or congestion, moist mucous membranes  Chest: CTAB, no crackles/wheezes, good air entry, no tachypnea or retractions  CV: regular rate and rhythm, no murmurs   Abd: soft, no HSM appreciated, +BS, tenderness to palpation of the epigastric area    Assessment:  18yo F PMH of GERD s/p laparoscopic cholecystectomy in 2023 presenting w/ epigastric pain x2 months, admitted for inpatient management of intractable pain. Afebrile, vitals appropriate for age. PE pertinent for pain to palpation of epigastric region and LUQ > RUQ. Labs grossly unremarkable, normal lipase and liver enzymes. Previous testing over the last couple of days show negative UA/UCx, EKG, and CT abdomen/pelvis. Differentials for current presentation include poorly controlled GERD given worsening of pain with eating and location. In the setting of diarrhea and vomiting, differential could include H.pylori despite negative testing in April. UA results does not meet the criteria for an UTI.     Plan:   RESP  - RA    CVS  - HDS    FENGI  - Low fat, low acid pediatric diet  - IV locked  - Protonix 40mg PO qd  - Miralax QD  - Zofran 4mg ODT q8hrs PRN    NEURO  - Tylenol 650mg PO q6h PRN    Access  - R. AC
17y    Female    Patient is a 17y old  Female who presents with a chief complaint of abdominal pain (19 May 2023 21:00)      VITALS:  Vital Signs Last 24 Hrs  T(C): 36.6 (20 May 2023 11:49), Max: 37.3 (19 May 2023 23:10)  T(F): 97.8 (20 May 2023 11:49), Max: 99.1 (19 May 2023 23:10)  HR: 62 (20 May 2023 11:49) (62 - 82)  BP: 109/54 (20 May 2023 11:49) (100/50 - 118/62)  BP(mean): 78 (20 May 2023 11:49) (62 - 88)  RR: 20 (20 May 2023 11:49) (18 - 20)  SpO2: 100% (20 May 2023 11:49) (99% - 100%)    Parameters below as of 20 May 2023 11:49  Patient On (Oxygen Delivery Method): room air      MEDICATIONS:  MEDICATIONS  (STANDING):  pantoprazole    Tablet 40 milliGRAM(s) Oral before breakfast  polyethylene glycol 3350 17 Gram(s) Oral daily  sodium chloride 0.9% lock flush 3 milliLiter(s) IV Push every 8 hours    MEDICATIONS  (PRN):  acetaminophen     Tablet .. 650 milliGRAM(s) Oral every 6 hours PRN Moderate Pain (4 - 6)  ondansetron   Disintegrating Tablet 4 milliGRAM(s) Oral every 8 hours PRN Nausea and/or Vomiting

## 2023-05-24 DIAGNOSIS — E66.9 OBESITY, UNSPECIFIED: ICD-10-CM

## 2023-05-24 DIAGNOSIS — M54.9 DORSALGIA, UNSPECIFIED: ICD-10-CM

## 2023-05-24 DIAGNOSIS — Z79.899 OTHER LONG TERM (CURRENT) DRUG THERAPY: ICD-10-CM

## 2023-05-24 DIAGNOSIS — E66.01 MORBID (SEVERE) OBESITY DUE TO EXCESS CALORIES: ICD-10-CM

## 2023-05-24 DIAGNOSIS — I44.30 UNSPECIFIED ATRIOVENTRICULAR BLOCK: ICD-10-CM

## 2023-05-24 DIAGNOSIS — K59.00 CONSTIPATION, UNSPECIFIED: ICD-10-CM

## 2023-05-24 DIAGNOSIS — R10.9 UNSPECIFIED ABDOMINAL PAIN: ICD-10-CM

## 2023-05-24 DIAGNOSIS — R51.9 HEADACHE, UNSPECIFIED: ICD-10-CM

## 2023-05-24 DIAGNOSIS — R63.8 OTHER SYMPTOMS AND SIGNS CONCERNING FOOD AND FLUID INTAKE: ICD-10-CM

## 2023-05-24 DIAGNOSIS — Z90.49 ACQUIRED ABSENCE OF OTHER SPECIFIED PARTS OF DIGESTIVE TRACT: ICD-10-CM

## 2023-05-24 DIAGNOSIS — K21.9 GASTRO-ESOPHAGEAL REFLUX DISEASE WITHOUT ESOPHAGITIS: ICD-10-CM

## 2023-05-24 DIAGNOSIS — Z87.898 PERSONAL HISTORY OF OTHER SPECIFIED CONDITIONS: ICD-10-CM

## 2023-05-24 DIAGNOSIS — R11.10 VOMITING, UNSPECIFIED: ICD-10-CM

## 2023-05-24 DIAGNOSIS — R19.7 DIARRHEA, UNSPECIFIED: ICD-10-CM

## 2023-05-24 DIAGNOSIS — Z20.822 CONTACT WITH AND (SUSPECTED) EXPOSURE TO COVID-19: ICD-10-CM

## 2023-05-26 LAB
CARBOXYTHC UR CFM-MCNC: NEGATIVE NG/ML — SIGNIFICANT CHANGE UP
CARBOXYTHC UR QL SCN: 125.3 MG/DL — SIGNIFICANT CHANGE UP
CARBOXYTHC UR QL SCN: SIGNIFICANT CHANGE UP
THC CREATININE URINE: 125.3 MG/DL — SIGNIFICANT CHANGE UP
THC METABOLITE/CREAT URINE: SIGNIFICANT CHANGE UP

## 2023-06-01 ENCOUNTER — APPOINTMENT (OUTPATIENT)
Dept: PEDIATRIC GASTROENTEROLOGY | Facility: CLINIC | Age: 18
End: 2023-06-01

## 2023-10-13 NOTE — ED PEDIATRIC NURSE NOTE - FALLS ASSESSMENT TOOL TOTAL
Left message to call back for:  patient  Information to relay to patient: Pt requesting lab work for lab only appt on 10/19/23 before appt with Daryl Ybarra on 11/7/23. Per Daryl, he does not have lab work done before he sees pt, due to the nature that there may need to be add ons during appt discussion. Please cancel lab only appt on 10/19/23  .Megan Frost MA on 10/13/2023 at 10:14 AM       7

## 2023-10-30 ENCOUNTER — EMERGENCY (EMERGENCY)
Facility: HOSPITAL | Age: 18
LOS: 0 days | Discharge: HOME | End: 2023-10-31
Attending: EMERGENCY MEDICINE
Payer: MEDICAID

## 2023-10-30 VITALS
OXYGEN SATURATION: 100 % | HEART RATE: 66 BPM | TEMPERATURE: 98 F | WEIGHT: 293 LBS | SYSTOLIC BLOOD PRESSURE: 112 MMHG | DIASTOLIC BLOOD PRESSURE: 49 MMHG | RESPIRATION RATE: 18 BRPM

## 2023-10-30 DIAGNOSIS — Z90.49 ACQUIRED ABSENCE OF OTHER SPECIFIED PARTS OF DIGESTIVE TRACT: ICD-10-CM

## 2023-10-30 DIAGNOSIS — R11.0 NAUSEA: ICD-10-CM

## 2023-10-30 DIAGNOSIS — R10.11 RIGHT UPPER QUADRANT PAIN: ICD-10-CM

## 2023-10-30 DIAGNOSIS — Z90.49 ACQUIRED ABSENCE OF OTHER SPECIFIED PARTS OF DIGESTIVE TRACT: Chronic | ICD-10-CM

## 2023-10-30 DIAGNOSIS — K21.9 GASTRO-ESOPHAGEAL REFLUX DISEASE WITHOUT ESOPHAGITIS: ICD-10-CM

## 2023-10-30 DIAGNOSIS — R10.12 LEFT UPPER QUADRANT PAIN: ICD-10-CM

## 2023-10-30 DIAGNOSIS — R10.9 UNSPECIFIED ABDOMINAL PAIN: ICD-10-CM

## 2023-10-30 DIAGNOSIS — Z86.2 PERSONAL HISTORY OF DISEASES OF THE BLOOD AND BLOOD-FORMING ORGANS AND CERTAIN DISORDERS INVOLVING THE IMMUNE MECHANISM: ICD-10-CM

## 2023-10-30 DIAGNOSIS — R10.13 EPIGASTRIC PAIN: ICD-10-CM

## 2023-10-30 DIAGNOSIS — Z87.19 PERSONAL HISTORY OF OTHER DISEASES OF THE DIGESTIVE SYSTEM: ICD-10-CM

## 2023-10-30 PROCEDURE — 99284 EMERGENCY DEPT VISIT MOD MDM: CPT | Mod: 25

## 2023-10-30 PROCEDURE — 83690 ASSAY OF LIPASE: CPT

## 2023-10-30 PROCEDURE — 80053 COMPREHEN METABOLIC PANEL: CPT

## 2023-10-30 PROCEDURE — 85025 COMPLETE CBC W/AUTO DIFF WBC: CPT

## 2023-10-30 PROCEDURE — 81001 URINALYSIS AUTO W/SCOPE: CPT

## 2023-10-30 PROCEDURE — 99284 EMERGENCY DEPT VISIT MOD MDM: CPT

## 2023-10-30 PROCEDURE — 83605 ASSAY OF LACTIC ACID: CPT

## 2023-10-30 PROCEDURE — 36415 COLL VENOUS BLD VENIPUNCTURE: CPT

## 2023-10-30 PROCEDURE — 96374 THER/PROPH/DIAG INJ IV PUSH: CPT

## 2023-10-30 RX ORDER — FAMOTIDINE 10 MG/ML
20 INJECTION INTRAVENOUS ONCE
Refills: 0 | Status: COMPLETED | OUTPATIENT
Start: 2023-10-30 | End: 2023-10-30

## 2023-10-30 NOTE — ED ADULT NURSE NOTE - NSFALLUNIVINTERV_ED_ALL_ED
Bed/Stretcher in lowest position, wheels locked, appropriate side rails in place/Call bell, personal items and telephone in reach/Instruct patient to call for assistance before getting out of bed/chair/stretcher/Non-slip footwear applied when patient is off stretcher/[place] to call system/Physically safe environment - no spills, clutter or unnecessary equipment/Purposeful proactive rounding/Room/bathroom lighting operational, light cord in reach

## 2023-10-31 LAB
ALBUMIN SERPL ELPH-MCNC: 3.8 G/DL — SIGNIFICANT CHANGE UP (ref 3.5–5.2)
ALBUMIN SERPL ELPH-MCNC: 3.8 G/DL — SIGNIFICANT CHANGE UP (ref 3.5–5.2)
ALP SERPL-CCNC: 79 U/L — SIGNIFICANT CHANGE UP (ref 30–115)
ALP SERPL-CCNC: 79 U/L — SIGNIFICANT CHANGE UP (ref 30–115)
ALT FLD-CCNC: 8 U/L — LOW (ref 14–37)
ALT FLD-CCNC: 8 U/L — LOW (ref 14–37)
ANION GAP SERPL CALC-SCNC: 11 MMOL/L — SIGNIFICANT CHANGE UP (ref 7–14)
ANION GAP SERPL CALC-SCNC: 11 MMOL/L — SIGNIFICANT CHANGE UP (ref 7–14)
APPEARANCE UR: CLEAR — SIGNIFICANT CHANGE UP
APPEARANCE UR: CLEAR — SIGNIFICANT CHANGE UP
AST SERPL-CCNC: 26 U/L — SIGNIFICANT CHANGE UP (ref 14–37)
AST SERPL-CCNC: 26 U/L — SIGNIFICANT CHANGE UP (ref 14–37)
BASOPHILS # BLD AUTO: 0.02 K/UL — SIGNIFICANT CHANGE UP (ref 0–0.2)
BASOPHILS # BLD AUTO: 0.02 K/UL — SIGNIFICANT CHANGE UP (ref 0–0.2)
BASOPHILS NFR BLD AUTO: 0.3 % — SIGNIFICANT CHANGE UP (ref 0–1)
BASOPHILS NFR BLD AUTO: 0.3 % — SIGNIFICANT CHANGE UP (ref 0–1)
BILIRUB SERPL-MCNC: <0.2 MG/DL — SIGNIFICANT CHANGE UP (ref 0.2–1.2)
BILIRUB SERPL-MCNC: <0.2 MG/DL — SIGNIFICANT CHANGE UP (ref 0.2–1.2)
BILIRUB UR-MCNC: NEGATIVE — SIGNIFICANT CHANGE UP
BILIRUB UR-MCNC: NEGATIVE — SIGNIFICANT CHANGE UP
BUN SERPL-MCNC: 9 MG/DL — LOW (ref 10–20)
BUN SERPL-MCNC: 9 MG/DL — LOW (ref 10–20)
CALCIUM SERPL-MCNC: 9 MG/DL — SIGNIFICANT CHANGE UP (ref 8.4–10.5)
CALCIUM SERPL-MCNC: 9 MG/DL — SIGNIFICANT CHANGE UP (ref 8.4–10.5)
CHLORIDE SERPL-SCNC: 105 MMOL/L — SIGNIFICANT CHANGE UP (ref 98–110)
CHLORIDE SERPL-SCNC: 105 MMOL/L — SIGNIFICANT CHANGE UP (ref 98–110)
CO2 SERPL-SCNC: 24 MMOL/L — SIGNIFICANT CHANGE UP (ref 17–32)
CO2 SERPL-SCNC: 24 MMOL/L — SIGNIFICANT CHANGE UP (ref 17–32)
COLOR SPEC: YELLOW — SIGNIFICANT CHANGE UP
COLOR SPEC: YELLOW — SIGNIFICANT CHANGE UP
CREAT SERPL-MCNC: 0.7 MG/DL — SIGNIFICANT CHANGE UP (ref 0.3–1)
CREAT SERPL-MCNC: 0.7 MG/DL — SIGNIFICANT CHANGE UP (ref 0.3–1)
DIFF PNL FLD: NEGATIVE — SIGNIFICANT CHANGE UP
DIFF PNL FLD: NEGATIVE — SIGNIFICANT CHANGE UP
EGFR: 128 ML/MIN/1.73M2 — SIGNIFICANT CHANGE UP
EGFR: 128 ML/MIN/1.73M2 — SIGNIFICANT CHANGE UP
EOSINOPHIL # BLD AUTO: 0.06 K/UL — SIGNIFICANT CHANGE UP (ref 0–0.7)
EOSINOPHIL # BLD AUTO: 0.06 K/UL — SIGNIFICANT CHANGE UP (ref 0–0.7)
EOSINOPHIL NFR BLD AUTO: 0.9 % — SIGNIFICANT CHANGE UP (ref 0–8)
EOSINOPHIL NFR BLD AUTO: 0.9 % — SIGNIFICANT CHANGE UP (ref 0–8)
GLUCOSE SERPL-MCNC: 78 MG/DL — SIGNIFICANT CHANGE UP (ref 70–99)
GLUCOSE SERPL-MCNC: 78 MG/DL — SIGNIFICANT CHANGE UP (ref 70–99)
GLUCOSE UR QL: NEGATIVE MG/DL — SIGNIFICANT CHANGE UP
GLUCOSE UR QL: NEGATIVE MG/DL — SIGNIFICANT CHANGE UP
HCT VFR BLD CALC: 39 % — SIGNIFICANT CHANGE UP (ref 37–47)
HCT VFR BLD CALC: 39 % — SIGNIFICANT CHANGE UP (ref 37–47)
HGB BLD-MCNC: 12.6 G/DL — SIGNIFICANT CHANGE UP (ref 12–16)
HGB BLD-MCNC: 12.6 G/DL — SIGNIFICANT CHANGE UP (ref 12–16)
IMM GRANULOCYTES NFR BLD AUTO: 0 % — LOW (ref 0.1–0.3)
IMM GRANULOCYTES NFR BLD AUTO: 0 % — LOW (ref 0.1–0.3)
KETONES UR-MCNC: NEGATIVE MG/DL — SIGNIFICANT CHANGE UP
KETONES UR-MCNC: NEGATIVE MG/DL — SIGNIFICANT CHANGE UP
LACTATE SERPL-SCNC: 1.3 MMOL/L — SIGNIFICANT CHANGE UP (ref 0.7–2)
LACTATE SERPL-SCNC: 1.3 MMOL/L — SIGNIFICANT CHANGE UP (ref 0.7–2)
LEUKOCYTE ESTERASE UR-ACNC: ABNORMAL
LEUKOCYTE ESTERASE UR-ACNC: ABNORMAL
LIDOCAIN IGE QN: 27 U/L — SIGNIFICANT CHANGE UP (ref 7–60)
LIDOCAIN IGE QN: 27 U/L — SIGNIFICANT CHANGE UP (ref 7–60)
LYMPHOCYTES # BLD AUTO: 2.4 K/UL — SIGNIFICANT CHANGE UP (ref 1.2–3.4)
LYMPHOCYTES # BLD AUTO: 2.4 K/UL — SIGNIFICANT CHANGE UP (ref 1.2–3.4)
LYMPHOCYTES # BLD AUTO: 34.8 % — SIGNIFICANT CHANGE UP (ref 20.5–51.1)
LYMPHOCYTES # BLD AUTO: 34.8 % — SIGNIFICANT CHANGE UP (ref 20.5–51.1)
MCHC RBC-ENTMCNC: 25.8 PG — LOW (ref 27–31)
MCHC RBC-ENTMCNC: 25.8 PG — LOW (ref 27–31)
MCHC RBC-ENTMCNC: 32.3 G/DL — SIGNIFICANT CHANGE UP (ref 32–37)
MCHC RBC-ENTMCNC: 32.3 G/DL — SIGNIFICANT CHANGE UP (ref 32–37)
MCV RBC AUTO: 79.8 FL — LOW (ref 81–99)
MCV RBC AUTO: 79.8 FL — LOW (ref 81–99)
MONOCYTES # BLD AUTO: 0.71 K/UL — HIGH (ref 0.1–0.6)
MONOCYTES # BLD AUTO: 0.71 K/UL — HIGH (ref 0.1–0.6)
MONOCYTES NFR BLD AUTO: 10.3 % — HIGH (ref 1.7–9.3)
MONOCYTES NFR BLD AUTO: 10.3 % — HIGH (ref 1.7–9.3)
NEUTROPHILS # BLD AUTO: 3.71 K/UL — SIGNIFICANT CHANGE UP (ref 1.4–6.5)
NEUTROPHILS # BLD AUTO: 3.71 K/UL — SIGNIFICANT CHANGE UP (ref 1.4–6.5)
NEUTROPHILS NFR BLD AUTO: 53.7 % — SIGNIFICANT CHANGE UP (ref 42.2–75.2)
NEUTROPHILS NFR BLD AUTO: 53.7 % — SIGNIFICANT CHANGE UP (ref 42.2–75.2)
NITRITE UR-MCNC: NEGATIVE — SIGNIFICANT CHANGE UP
NITRITE UR-MCNC: NEGATIVE — SIGNIFICANT CHANGE UP
NRBC # BLD: 0 /100 WBCS — SIGNIFICANT CHANGE UP (ref 0–0)
NRBC # BLD: 0 /100 WBCS — SIGNIFICANT CHANGE UP (ref 0–0)
PH UR: 6 — SIGNIFICANT CHANGE UP (ref 5–8)
PH UR: 6 — SIGNIFICANT CHANGE UP (ref 5–8)
PLATELET # BLD AUTO: 256 K/UL — SIGNIFICANT CHANGE UP (ref 130–400)
PLATELET # BLD AUTO: 256 K/UL — SIGNIFICANT CHANGE UP (ref 130–400)
PMV BLD: 11.9 FL — HIGH (ref 7.4–10.4)
PMV BLD: 11.9 FL — HIGH (ref 7.4–10.4)
POTASSIUM SERPL-MCNC: 5.4 MMOL/L — HIGH (ref 3.5–5)
POTASSIUM SERPL-MCNC: 5.4 MMOL/L — HIGH (ref 3.5–5)
POTASSIUM SERPL-SCNC: 5.4 MMOL/L — HIGH (ref 3.5–5)
POTASSIUM SERPL-SCNC: 5.4 MMOL/L — HIGH (ref 3.5–5)
PROT SERPL-MCNC: 7.1 G/DL — SIGNIFICANT CHANGE UP (ref 6.1–8)
PROT SERPL-MCNC: 7.1 G/DL — SIGNIFICANT CHANGE UP (ref 6.1–8)
PROT UR-MCNC: NEGATIVE MG/DL — SIGNIFICANT CHANGE UP
PROT UR-MCNC: NEGATIVE MG/DL — SIGNIFICANT CHANGE UP
RBC # BLD: 4.89 M/UL — SIGNIFICANT CHANGE UP (ref 4.2–5.4)
RBC # BLD: 4.89 M/UL — SIGNIFICANT CHANGE UP (ref 4.2–5.4)
RBC # FLD: 14 % — SIGNIFICANT CHANGE UP (ref 11.5–14.5)
RBC # FLD: 14 % — SIGNIFICANT CHANGE UP (ref 11.5–14.5)
SODIUM SERPL-SCNC: 140 MMOL/L — SIGNIFICANT CHANGE UP (ref 135–146)
SODIUM SERPL-SCNC: 140 MMOL/L — SIGNIFICANT CHANGE UP (ref 135–146)
SP GR SPEC: 1.02 — SIGNIFICANT CHANGE UP (ref 1–1.03)
SP GR SPEC: 1.02 — SIGNIFICANT CHANGE UP (ref 1–1.03)
UROBILINOGEN FLD QL: 0.2 MG/DL — SIGNIFICANT CHANGE UP (ref 0.2–1)
UROBILINOGEN FLD QL: 0.2 MG/DL — SIGNIFICANT CHANGE UP (ref 0.2–1)
WBC # BLD: 6.9 K/UL — SIGNIFICANT CHANGE UP (ref 4.8–10.8)
WBC # BLD: 6.9 K/UL — SIGNIFICANT CHANGE UP (ref 4.8–10.8)
WBC # FLD AUTO: 6.9 K/UL — SIGNIFICANT CHANGE UP (ref 4.8–10.8)
WBC # FLD AUTO: 6.9 K/UL — SIGNIFICANT CHANGE UP (ref 4.8–10.8)

## 2023-10-31 RX ORDER — OMEPRAZOLE 10 MG/1
1 CAPSULE, DELAYED RELEASE ORAL
Qty: 14 | Refills: 0
Start: 2023-10-31 | End: 2023-11-13

## 2023-10-31 RX ADMIN — FAMOTIDINE 20 MILLIGRAM(S): 10 INJECTION INTRAVENOUS at 02:04

## 2023-10-31 RX ADMIN — Medication 30 MILLILITER(S): at 02:04

## 2023-10-31 NOTE — ED PROVIDER NOTE - CARE PROVIDER_API CALL
Paul Singh  Gastroenterology  [address] [place] [id number]  Phone: [phone]  Fax: [phone]  Established Patient  Follow Up Time: 7-10 Days

## 2023-10-31 NOTE — ED PROVIDER NOTE - PHYSICAL EXAMINATION
Physical Exam    Vital Signs: I have reviewed the initial vital signs.  Constitutional: well-nourished, appears stated age, no acute distress  Eyes: Conjunctiva pink, Sclera clear  Cardiovascular: S1 and S2, regular rate, regular rhythm, well-perfused extremities, radial pulses equal and 2+ b/l.   Respiratory: unlabored respiratory effort, clear to auscultation bilaterally no wheezing, rales and rhonchi. pt is speaking full sentences. no accessory muscle use.   Gastrointestinal: soft, (+) mild ruq, epigastric and luq tenderness, nondistended abdomen, no pulsatile mass, no rebound, no guarding, negative murphys.   Musculoskeletal: FROM of b/l upper and lower extremities.   Integumentary: warm, dry, no rash  Neurologic: awake, alert, steady gait.   Psychiatric: appropriate mood, appropriate affect

## 2023-10-31 NOTE — ED PROVIDER NOTE - ATTENDING APP SHARED VISIT CONTRIBUTION OF CARE
17-year-old female PMH GERD PSH lap cholecystectomy presenting with generalized abdominal pain x2 weeks.  Primary located in epigastrium.  No specific aggravating or alleviating factors, no associated symptoms.  Patient reports prior EGD by [facility] gastroenterology in March 2023, was previously taking both Pepcid and Protonix, however stop Protonix herself approximate 1 month prior.  Denies any F/C, NVD, melena, BRBPR, flank pain, urinary symptoms.    PE:  nad  skin warm, dry  ncat  neck supple  rrr nl s1s2 no mrg  ctab no wrr  abd soft ntnd no palpable masses no rgr  back non-tender no cvat  ext no cce dpi  neuro aaox3 grossly nf exam

## 2023-10-31 NOTE — ED PROVIDER NOTE - CLINICAL SUMMARY MEDICAL DECISION MAKING FREE TEXT BOX
Labs and EKG were ordered and reviewed, where indicated.  Imaging was ordered and reviewed by me, where indicated.  Appropriate medications for patient's presenting complaints were ordered and effects were reassessed, where indicated.  Patient's records (prior hospital, ED visit, and/or nursing home note) were reviewed, if available.  Additional history was obtained from EMS, family, and/or PCP (where available).  Escalation to admission/observation was considered.  However patient feels much better and patient/parent is comfortable with discharge.  Appropriate follow-up was arranged.     Abdominal pain x2 weeks, PMH GERD/gastritis, recently self stopped Protonix approximately 1 month prior–AVSS, labs as resulted, patient reassessed after GI meds feeling better abdomen soft NTND tolerating p.o. - advised to restart protonix as previously prescribed, Rx sent, all results d/w pt & copies given, strict return precautions discussed, rec outpt GI f/u

## 2023-10-31 NOTE — ED PROVIDER NOTE - OBJECTIVE STATEMENT
17-year-old female with a past medical history of cholecystectomy in February 2023, GERD, and iron deficiency anemia presents to the ED for evaluation of intermittent sharp diffuse abdominal pain for the past 3 weeks.  Patient reports she had an endoscopy with her GI doctor in [place] in March.  Patient reports she has a history of an umbilical hernia.  Patient reports intermittent nausea. Patient reports initially she was on omeprazole which was not working for her and then she was put on 40 mg of pantoprazole which worked for her and is now on famotidine.  Patient reports she has not been on the pantoprazole for the past month and her primary care doctor switched her to famotidine only to see what works better. Patient denies fever, chest pain, shortness of breath, back pain, vomiting, diarrhea, constipation, urinary symptoms, recent trauma, or weakness.

## 2023-10-31 NOTE — ED PROVIDER NOTE - PATIENT PORTAL LINK FT
You can access the FollowMyHealth Patient Portal offered by [facility] by registering at the following website: [email or web address]. By joining "[employer]’s FollowMyHealth portal, you will also be able to view your health information using other applications (apps) compatible with our system.

## 2023-12-04 ENCOUNTER — EMERGENCY (EMERGENCY)
Facility: HOSPITAL | Age: 18
LOS: 0 days | Discharge: HOME | End: 2023-12-04
Attending: PEDIATRICS
Payer: MEDICAID

## 2023-12-04 VITALS
DIASTOLIC BLOOD PRESSURE: 60 MMHG | RESPIRATION RATE: 17 BRPM | TEMPERATURE: 98 F | WEIGHT: 293 LBS | OXYGEN SATURATION: 98 % | SYSTOLIC BLOOD PRESSURE: 137 MMHG | HEART RATE: 72 BPM

## 2023-12-04 DIAGNOSIS — Z90.49 ACQUIRED ABSENCE OF OTHER SPECIFIED PARTS OF DIGESTIVE TRACT: ICD-10-CM

## 2023-12-04 DIAGNOSIS — K59.00 CONSTIPATION, UNSPECIFIED: ICD-10-CM

## 2023-12-04 DIAGNOSIS — R07.9 CHEST PAIN, UNSPECIFIED: ICD-10-CM

## 2023-12-04 DIAGNOSIS — K21.9 GASTRO-ESOPHAGEAL REFLUX DISEASE WITHOUT ESOPHAGITIS: ICD-10-CM

## 2023-12-04 DIAGNOSIS — Z87.19 PERSONAL HISTORY OF OTHER DISEASES OF THE DIGESTIVE SYSTEM: ICD-10-CM

## 2023-12-04 DIAGNOSIS — Z86.79 PERSONAL HISTORY OF OTHER DISEASES OF THE CIRCULATORY SYSTEM: ICD-10-CM

## 2023-12-04 DIAGNOSIS — Z90.49 ACQUIRED ABSENCE OF OTHER SPECIFIED PARTS OF DIGESTIVE TRACT: Chronic | ICD-10-CM

## 2023-12-04 DIAGNOSIS — R10.9 UNSPECIFIED ABDOMINAL PAIN: ICD-10-CM

## 2023-12-04 PROCEDURE — 93005 ELECTROCARDIOGRAM TRACING: CPT

## 2023-12-04 PROCEDURE — 99284 EMERGENCY DEPT VISIT MOD MDM: CPT

## 2023-12-04 PROCEDURE — 93010 ELECTROCARDIOGRAM REPORT: CPT

## 2023-12-04 PROCEDURE — 99283 EMERGENCY DEPT VISIT LOW MDM: CPT | Mod: 25

## 2023-12-04 RX ORDER — SUCRALFATE 1 G
1 TABLET ORAL ONCE
Refills: 0 | Status: COMPLETED | OUTPATIENT
Start: 2023-12-04 | End: 2023-12-04

## 2023-12-04 RX ORDER — SUCRALFATE 1 G
10 TABLET ORAL
Qty: 350 | Refills: 0
Start: 2023-12-04 | End: 2023-12-10

## 2023-12-04 RX ORDER — FAMOTIDINE 10 MG/ML
20 INJECTION INTRAVENOUS DAILY
Refills: 0 | Status: DISCONTINUED | OUTPATIENT
Start: 2023-12-04 | End: 2023-12-04

## 2023-12-04 RX ADMIN — FAMOTIDINE 20 MILLIGRAM(S): 10 INJECTION INTRAVENOUS at 17:30

## 2023-12-04 RX ADMIN — Medication 1 GRAM(S): at 17:31

## 2023-12-04 RX ADMIN — Medication 20 MILLIGRAM(S): at 21:00

## 2023-12-04 RX ADMIN — Medication 30 MILLILITER(S): at 17:31

## 2023-12-04 NOTE — ED PROVIDER NOTE - NSFOLLOWUPINSTRUCTIONS_ED_ALL_ED_FT
Follow up with your primary care physician, and make an appointment with your GI physician.   Make an appointment for a stress test with your cardiologist.    Gastroesophageal Reflux Disease, Adult    Gastroesophageal reflux (LAITH) happens when acid from the stomach flows up into the tube that connects the mouth and the stomach (esophagus). Normally, food travels down the esophagus and stays in the stomach to be digested. However, when a person has LAITH, food and stomach acid sometimes move back up into the esophagus. If this becomes a more serious problem, the person may be diagnosed with a disease called gastroesophageal reflux disease (GERD). GERD occurs when the reflux:  Happens often.  Causes frequent or severe symptoms.  Causes problems such as damage to the esophagus.  When stomach acid comes in contact with the esophagus, the acid may cause inflammation in the esophagus. Over time, GERD may create small holes (ulcers) in the lining of the esophagus.    What are the causes?  This condition is caused by a problem with the muscle between the esophagus and the stomach (lower esophageal sphincter, or LES). Normally, the LES muscle closes after food passes through the esophagus to the stomach. When the LES is weakened or abnormal, it does not close properly, and that allows food and stomach acid to go back up into the esophagus.    The LES can be weakened by certain dietary substances, medicines, and medical conditions, including:  Tobacco use.  Pregnancy.  Having a hiatal hernia.  Alcohol use.  Certain foods and beverages, such as coffee, chocolate, onions, and peppermint.  What increases the risk?  You are more likely to develop this condition if you:  Have an increased body weight.  Have a connective tissue disorder.  Take NSAIDs, such as ibuprofen.  What are the signs or symptoms?  Symptoms of this condition include:  Heartburn.  Difficult or painful swallowing and the feeling of having a lump in the throat.  A bitter taste in the mouth.  Bad breath and having a large amount of saliva.  Having an upset or bloated stomach and belching.  Chest pain. Different conditions can cause chest pain. Make sure you see your health care provider if you experience chest pain.  Shortness of breath or wheezing.  Ongoing (chronic) cough or a nighttime cough.  Wearing away of tooth enamel.  Weight loss.  How is this diagnosed?  This condition may be diagnosed based on a medical history and a physical exam. To determine if you have mild or severe GERD, your health care provider may also monitor how you respond to treatment. You may also have tests, including:  A test to examine your stomach and esophagus with a small camera (endoscopy).  A test that measures the acidity level in your esophagus.  A test that measures how much pressure is on your esophagus.  A barium swallow or modified barium swallow test to show the shape, size, and functioning of your esophagus.    How is this treated?  Treatment for this condition may vary depending on how severe your symptoms are. Your health care provider may recommend:  Changes to your diet.  Medicine.  Surgery.  The goal of treatment is to help relieve your symptoms and to prevent complications.    Follow these instructions at home:  Eating and drinking      Follow a diet as recommended by your health care provider. This may involve avoiding foods and drinks such as:  Coffee and tea, with or without caffeine.  Drinks that contain alcohol.  Energy drinks and sports drinks.  Carbonated drinks or sodas.  Chocolate and cocoa.  Peppermint and mint flavorings.  Garlic and onions.  Horseradish.  Spicy and acidic foods, including peppers, chili powder, lion powder, vinegar, hot sauces, and barbecue sauce.  Citrus fruit juices and citrus fruits, such as oranges, chai, and limes.  Tomato-based foods, such as red sauce, chili, salsa, and pizza with red sauce.  Fried and fatty foods, such as donuts, french fries, potato chips, and high-fat dressings.  High-fat meats, such as hot dogs and fatty cuts of red and white meats, such as rib eye steak, sausage, ham, and majano.  High-fat dairy items, such as whole milk, butter, and cream cheese.  Eat small, frequent meals instead of large meals.  Avoid drinking large amounts of liquid with your meals.  Avoid eating meals during the 2–3 hours before bedtime.  Avoid lying down right after you eat.  Do not exercise right after you eat.  Lifestyle      Do not use any products that contain nicotine or tobacco. These products include cigarettes, chewing tobacco, and vaping devices, such as e-cigarettes. If you need help quitting, ask your health care provider.  Try to reduce your stress by using methods such as yoga or meditation. If you need help reducing stress, ask your health care provider.  If you are overweight, reduce your weight to an amount that is healthy for you. Ask your health care provider for guidance about a safe weight loss goal.  General instructions    Pay attention to any changes in your symptoms.  Take over-the-counter and prescription medicines only as told by your health care provider. Do not take aspirin, ibuprofen, or other NSAIDs unless your health care provider told you to take these medicines.  Wear loose-fitting clothing. Do not wear anything tight around your waist that causes pressure on your abdomen.  Raise (elevate) the head of your bed about 6 inches (15 cm). You can use a wedge to do this.  Avoid bending over if this makes your symptoms worse.  Keep all follow-up visits. This is important.    Contact a health care provider if:  You have:  New symptoms.  Unexplained weight loss.  Difficulty swallowing or it hurts to swallow.  Wheezing or a persistent cough.  A hoarse voice.  Your symptoms do not improve with treatment.    Get help right away if:  You have sudden pain in your arms, neck, jaw, teeth, or back.  You suddenly feel sweaty, dizzy, or light-headed.  You have chest pain or shortness of breath.  You vomit and the vomit is green, yellow, or black, or it looks like blood or coffee grounds.  You faint.  You have stool that is red, bloody, or black.  You cannot swallow, drink, or eat.  These symptoms may represent a serious problem that is an emergency. Do not wait to see if the symptoms will go away. Get medical help right away. Call your local emergency services (911 in the [place] Do not drive yourself to the hospital.    Summary  Gastroesophageal reflux happens when acid from the stomach flows up into the esophagus. GERD is a disease in which the reflux happens often, causes frequent or severe symptoms, or causes problems such as damage to the esophagus.  Treatment for this condition may vary depending on how severe your symptoms are. Your health care provider may recommend diet and lifestyle changes, medicine, or surgery.  Contact a health care provider if you have new or worsening symptoms.  Take over-the-counter and prescription medicines only as told by your health care provider. Do not take aspirin, ibuprofen, or other NSAIDs unless your health care provider told you to do so.  Keep all follow-up visits as told by your health care provider. This is important.    Abdominal Pain, Adult  Pain in the abdomen (abdominal pain) can be caused by many things. Often, abdominal pain is not serious and it gets better with no treatment or by being treated at home. However, sometimes abdominal pain is serious.    Your health care provider will ask questions about your medical history and do a physical exam to try to determine the cause of your abdominal pain.    Follow these instructions at home:    Medicines  Take over-the-counter and prescription medicines only as told by your health care provider.  Do not take a laxative unless told by your health care provider.    General instructions  Watch your condition for any changes.  Drink enough fluid to keep your urine pale yellow.  Keep all follow-up visits as told by your health care provider. This is important.    Contact a health care provider if:  Your abdominal pain changes or gets worse.  You are not hungry or you lose weight without trying.  You are constipated or have diarrhea for more than 2–3 days.  You have pain when you urinate or have a bowel movement.  Your abdominal pain wakes you up at night.  Your pain gets worse with meals, after eating, or with certain foods.  You are vomiting and cannot keep anything down.  You have a fever.  You have blood in your urine.    Get help right away if:  Your pain does not go away as soon as your health care provider told you to expect.  You cannot stop vomiting.  Your pain is only in areas of the abdomen, such as the right side or the left lower portion of the abdomen. Pain on the right side could be caused by appendicitis.  You have bloody or black stools, or stools that look like tar.  You have severe pain, cramping, or bloating in your abdomen.    You have signs of dehydration, such as:  Dark urine, very little urine, or no urine.  Cracked lips.  Dry mouth.  Sunken eyes.  Sleepiness.  Weakness.  You have trouble breathing     Summary  Often, abdominal pain is not serious and it gets better with no treatment or by being treated at home. However, sometimes abdominal pain is serious.  Watch your condition for any changes.  Take over-the-counter and prescription medicines only as told by your health care provider.  Contact a health care provider if your abdominal pain changes or gets worse.  Get help right away if you have severe pain, cramping, or bloating in your abdomen.

## 2023-12-04 NOTE — ED PROVIDER NOTE - PHYSICAL EXAMINATION
CONSTITUTIONAL: well-appearing, well nourished, non-toxic, NAD  SKIN: Warm dry, normal skin turgor  HEAD: NCAT  EYES: EOMI, no scleral icterus  ENT: Moist mucous membranes, normal pharynx with no erythema or exudates  NECK: Supple; Full ROM.   CARD: RRR, no murmurs, rubs or gallops  RESP: clear to ausculation b/l.  No rales, rhonchi, or wheezing.  ABD: soft, + BS, non-distended, no rebound or guarding, mild epigastric and periumbilical tenderness  EXT: Full ROM  NEURO: normal motor. normal sensory. Normal gait.  PSYCH: Cooperative, appropriate.

## 2023-12-04 NOTE — ED PROVIDER NOTE - OBJECTIVE STATEMENT
18y F w/ hx of GERD, cholecystectomy, umbilical hernia, and First Degree AV block presents for worsened chest pain and abdominal pain. Pt has had chronic chest burning since February and she has been managed with different medications with different levels of effectiveness. Most recently she has been on 40mg of Famotidine split between morning and night. It was working until recently. She was to go for a gastric bypass surgery, but hasn't been following up with GI because she's lives at a dorm in [place] for school and her GI is in [place]. Pt recently saw cardiology and was diagnosed with 1st Degree AV block and is due to go for a stress test. Pt endorses constipation x2 days, and states she sweats when the pain flares, but denies fever, chills, shortness of breath, cough, congestion, nausea, urinary symptoms or diarrhea. She took her morning dose of Famotidine today.    Pt went to an ED in [place] this weekend for lightheadedness was discharged with negative blood workup. 18y F w/ hx of GERD, cholecystectomy, umbilical hernia, and First Degree AV block presents for worsened chest pain and abdominal pain. Pt has had chronic chest burning since February and she has been managed with different medications with different levels of effectiveness. Most recently she has been on 40mg of Famotidine split between morning and night. It was working until recently. She was to go for a gastric bypass surgery, but hasn't been following up with GI because she's lives at a dorm in [place] for school and her GI is in [place]. Pt recently saw cardiology and was diagnosed with 1st Degree AV block and is due to go for a stress test. Pt endorses constipation x2 days, and states she sweats when the pain flares, but denies fever, chills, shortness of breath, cough, congestion, nausea, urinary symptoms or diarrhea. She took her morning dose of Famotidine today.    Pt went to an ED in [place] this weekend for lightheadedness was discharged with negative blood workup.

## 2023-12-04 NOTE — ED PROVIDER NOTE - ATTENDING CONTRIBUTION TO CARE
18-year-old female presents to the ED for chronic epigastric pain.  Patient has been seen in the ED for similar complaints in the past and also by myself.  Has had outpatient endoscopy is and was trialed on multiple medications.  Patient reports she was fine until Thanksgiving when the pain returned.  She was taking famotidine which was helping.  Denies any lower abdominal pain.  No vomiting.  Reports pain is worse when she eats.  No constipation or diarrhea.  No fevers or chills.  Patient is currently here with her cousin who was sickle cell.  Vital signs reviewed general well-appearing comfortable appearing talking on phone and interactive with her cousin no acute distress HEENT PERRLA EOMI TMs clear pharynx clear moist mucous membranes CVS S1-S2 no murmurs lungs clear to auscultation bilaterally abdomen soft nontender nondistended extremities full range of motion x4 skin no rashes warm well perfused.  Assessment: Epigastric pain.  Plan: GI meds.  Likely discharge with outpatient GI follow-up.

## 2023-12-04 NOTE — ED PROVIDER NOTE - PATIENT PORTAL LINK FT
You can access the FollowMyHealth Patient Portal offered by [facility] by registering at the following website: [email or web address]. By joining [employer]’s FollowMyHealth portal, you will also be able to view your health information using other applications (apps) compatible with our system. You can access the FollowMyHealth Patient Portal offered by [facility] by registering at the following website: [email or web address]. By joining [employer]’s FollowMyHealth portal, you will also be able to view your health information using other applications (apps) compatible with our system.

## 2023-12-04 NOTE — ED PROVIDER NOTE - CLINICAL SUMMARY MEDICAL DECISION MAKING FREE TEXT BOX
pt with chronic epigastric pain. gi meds given, pain mildly improved. continue outpt meds and follow up with gi. strict return precautions given.

## 2023-12-13 ENCOUNTER — EMERGENCY (EMERGENCY)
Facility: HOSPITAL | Age: 18
LOS: 0 days | Discharge: HOME | End: 2023-12-13
Attending: PEDIATRICS
Payer: MEDICAID

## 2023-12-13 VITALS
RESPIRATION RATE: 25 BRPM | DIASTOLIC BLOOD PRESSURE: 52 MMHG | HEART RATE: 67 BPM | WEIGHT: 293 LBS | SYSTOLIC BLOOD PRESSURE: 99 MMHG | OXYGEN SATURATION: 99 % | TEMPERATURE: 98 F

## 2023-12-13 DIAGNOSIS — R42 DIZZINESS AND GIDDINESS: ICD-10-CM

## 2023-12-13 DIAGNOSIS — R00.2 PALPITATIONS: ICD-10-CM

## 2023-12-13 DIAGNOSIS — R10.816 EPIGASTRIC ABDOMINAL TENDERNESS: ICD-10-CM

## 2023-12-13 DIAGNOSIS — R07.81 PLEURODYNIA: ICD-10-CM

## 2023-12-13 DIAGNOSIS — R06.02 SHORTNESS OF BREATH: ICD-10-CM

## 2023-12-13 DIAGNOSIS — I45.9 CONDUCTION DISORDER, UNSPECIFIED: ICD-10-CM

## 2023-12-13 DIAGNOSIS — K21.9 GASTRO-ESOPHAGEAL REFLUX DISEASE WITHOUT ESOPHAGITIS: ICD-10-CM

## 2023-12-13 DIAGNOSIS — R07.89 OTHER CHEST PAIN: ICD-10-CM

## 2023-12-13 DIAGNOSIS — Z90.49 ACQUIRED ABSENCE OF OTHER SPECIFIED PARTS OF DIGESTIVE TRACT: Chronic | ICD-10-CM

## 2023-12-13 PROCEDURE — 71046 X-RAY EXAM CHEST 2 VIEWS: CPT | Mod: 26

## 2023-12-13 PROCEDURE — 96372 THER/PROPH/DIAG INJ SC/IM: CPT

## 2023-12-13 PROCEDURE — 99284 EMERGENCY DEPT VISIT MOD MDM: CPT

## 2023-12-13 PROCEDURE — 99283 EMERGENCY DEPT VISIT LOW MDM: CPT | Mod: 25

## 2023-12-13 PROCEDURE — 93010 ELECTROCARDIOGRAM REPORT: CPT

## 2023-12-13 PROCEDURE — 71046 X-RAY EXAM CHEST 2 VIEWS: CPT

## 2023-12-13 PROCEDURE — 93005 ELECTROCARDIOGRAM TRACING: CPT

## 2023-12-13 RX ORDER — KETOROLAC TROMETHAMINE 30 MG/ML
30 SYRINGE (ML) INJECTION ONCE
Refills: 0 | Status: DISCONTINUED | OUTPATIENT
Start: 2023-12-13 | End: 2023-12-13

## 2023-12-13 RX ADMIN — Medication 30 MILLIGRAM(S): at 01:59

## 2023-12-13 NOTE — ED PROVIDER NOTE - PHYSICAL EXAMINATION
General: Awake, alert, NAD.  HEENT: NCAT, PERRL, oropharynx without erythema or exudates, moist mucous membranes.  RESP: CTAB, no increased work of breathing.  CVS: S1, S2, no murmurs, cap refill <2 sec, 2+ peripheral pulses.  ABD: (+) BS, soft, non distended, no masses. (+) mild epigastric tenderness   MSK: FROM in all extremities, no tenderness, no deformities. (+) reproducible midsternal and lower right rib pain  NEURO: CNs II-XII grossly intact, motor 5/5, normal tone.  SKIN: Warm, dry, well-perfused, no rashes.

## 2023-12-13 NOTE — ED PROVIDER NOTE - OBJECTIVE STATEMENT
19yo with GERD, first degree AV block, cholecystectomy, umbilical hernia, anemia p/w sternal chest pain that radiates to the right since this afternoon. Patient was sitting for her final exams. She states it feels like someone "sitting on my chest". Patient reports feeling intermittent SOB, palpitations, and dizziness that can last 1 hour for past 3 weeks. She has cardio appointment scheduled on Monday. Had cough last week. Denies fever, vomiting, diarrhea, rashes. PO intake, voiding, and stooling at baseline. Takes B12 injection. Recently stopped famotidine and switched to sulcralfate. Denies smoking, vaping, or drug use. 17yo with GERD, first degree AV block, cholecystectomy, umbilical hernia, anemia p/w sternal chest pain that radiates to the right since this afternoon. Patient was sitting for her final exams. She states it feels like someone "sitting on my chest". Patient reports feeling intermittent SOB, palpitations, and dizziness that can last 1 hour for past 3 weeks. She has cardio appointment scheduled on Monday. Had cough last week. Denies fever, vomiting, diarrhea, rashes. PO intake, voiding, and stooling at baseline. Takes B12 injection. Recently stopped famotidine and switched to sulcralfate. Denies smoking, vaping, or drug use. 19yo with GERD, first degree AV block, cholecystectomy, umbilical hernia, anemia p/w sternal chest pain that radiates to the right since this afternoon. Patient was sitting for her final exams. She states it feels like someone "sitting on my chest". Took Tylenol for pain. Patient reports feeling intermittent SOB, palpitations, and dizziness that can last 1 hour for past 3 weeks. She has cardio appointment scheduled on Monday. Had cough last week. Denies fever, vomiting, diarrhea, rashes. PO intake, voiding, and stooling at baseline. Takes B12 injection. Recently stopped famotidine and switched to sulcralfate. Denies smoking, vaping, or drug use.

## 2023-12-13 NOTE — ED PROVIDER NOTE - NSFOLLOWUPINSTRUCTIONS_ED_ALL_ED_FT
Follow up with your primary care doctor in 1-3 days. Follow up with your cardiologist as scheduled on Monday Dec 18th.       SEEK IMMEDIATE MEDICAL CARE IF YOU HAVE ANY OF THE FOLLOWING SYMPTOMS: worsening chest pain, coughing up blood, unexplained back/neck/jaw pain, severe abdominal pain, dizziness or lightheadedness, fainting, shortness of breath, sweaty or clammy skin, vomiting, or racing heart beat. These symptoms may represent a serious problem that is an emergency. Do not wait to see if the symptoms will go away. Get medical help right away. Call 911 and do not drive yourself to the hospital.

## 2023-12-13 NOTE — ED PROVIDER NOTE - CARE PROVIDER_API CALL
DAY LANDIS MD  Phone: [phone]  Fax: ()-  Follow Up Time: 1-3 Days   DAY LANDIS MD  Phone: [phone]  Fax: ()-  Follow Up Time: 1-3 Days

## 2023-12-13 NOTE — ED PEDIATRIC TRIAGE NOTE - SPO2 (%)
Writer called patient to inform on Previous note. Patient understood and says she will talk with  about forms at next visit 10/29/19.     99

## 2023-12-13 NOTE — ED PROVIDER NOTE - ATTENDING CONTRIBUTION TO CARE
18-year-old here for evaluation of chest pain  + past med hx + abd sx as per patient has been studying for exams positive because feels like her heart is racing physical exam is unremarkable we will do EKG chest x-ray after pain meds and reassess

## 2023-12-13 NOTE — ED PROVIDER NOTE - PROVIDER TOKENS
PROVIDER:[TOKEN:[[id number]],FOLLOWUP:[1-3 Days]] PROVIDER:[TOKEN:[[id number]],FOLLOWUP:[1-3 Days]]

## 2023-12-13 NOTE — ED PROVIDER NOTE - PATIENT PORTAL LINK FT
You can access the FollowMyHealth Patient Portal offered by [facility] by registering at the following website: [email or web address]. By joining [employer]’s FollowMyHealth portal, you will also be able to view your health information using other applications (apps) compatible with our system. You can access the FollowMyHealth Patient Portal offered by [facility] by registering at the following website: [email or web address]. By joining [employer]’s FollowMyHealth portal, you will also be able to view your health information using other applications (apps) compatible with our system.

## 2024-01-22 NOTE — ED ADULT NURSE NOTE - HIV OFFER
Caller: Patient       Doctor: Francesca    Reason for call: Patient has lt Total Knee scheduled for 2/5/24 and has some questions about medications and also about PT. She would like someone to call her back.    Call back # [phone]   Previously Declined (within the last year)

## 2024-01-30 ENCOUNTER — EMERGENCY (EMERGENCY)
Facility: HOSPITAL | Age: 19
LOS: 0 days | Discharge: HOME | End: 2024-01-31
Attending: PEDIATRICS
Payer: MEDICAID

## 2024-01-30 VITALS
HEART RATE: 76 BPM | DIASTOLIC BLOOD PRESSURE: 52 MMHG | OXYGEN SATURATION: 98 % | SYSTOLIC BLOOD PRESSURE: 115 MMHG | RESPIRATION RATE: 18 BRPM | TEMPERATURE: 98 F

## 2024-01-30 DIAGNOSIS — Z86.79 PERSONAL HISTORY OF OTHER DISEASES OF THE CIRCULATORY SYSTEM: ICD-10-CM

## 2024-01-30 DIAGNOSIS — R42 DIZZINESS AND GIDDINESS: ICD-10-CM

## 2024-01-30 DIAGNOSIS — Z90.49 ACQUIRED ABSENCE OF OTHER SPECIFIED PARTS OF DIGESTIVE TRACT: Chronic | ICD-10-CM

## 2024-01-30 DIAGNOSIS — Z90.49 ACQUIRED ABSENCE OF OTHER SPECIFIED PARTS OF DIGESTIVE TRACT: ICD-10-CM

## 2024-01-30 DIAGNOSIS — R05.9 COUGH, UNSPECIFIED: ICD-10-CM

## 2024-01-30 DIAGNOSIS — R10.9 UNSPECIFIED ABDOMINAL PAIN: ICD-10-CM

## 2024-01-30 DIAGNOSIS — K21.9 GASTRO-ESOPHAGEAL REFLUX DISEASE WITHOUT ESOPHAGITIS: ICD-10-CM

## 2024-01-30 PROCEDURE — 93005 ELECTROCARDIOGRAM TRACING: CPT

## 2024-01-30 PROCEDURE — 99284 EMERGENCY DEPT VISIT MOD MDM: CPT

## 2024-01-30 PROCEDURE — 99283 EMERGENCY DEPT VISIT LOW MDM: CPT | Mod: 25

## 2024-01-30 PROCEDURE — 93010 ELECTROCARDIOGRAM REPORT: CPT

## 2024-01-30 NOTE — ED ADULT TRIAGE NOTE - CHIEF COMPLAINT QUOTE
"Im having really bad chest pain and abd pain" that started yesterday.  pt reports nausea denies vomiting and diarrhea, pt states shes been "constipated"

## 2024-01-30 NOTE — ED PROVIDER NOTE - OBJECTIVE STATEMENT
18Y F PMH of GERD, first degree AV block and multiple ED visits with multiple complaints presenting complaining of 1 week h/o abdominal pain, cough, dizziness, and lightheadedness. Denies nausea, vomiting, constipation, diarrhea.     Meds: omeprazole 40mg qD  PSH: cholecystectomy  Allergies: none  PMD: Dr. Palencia

## 2024-01-30 NOTE — ED PROVIDER NOTE - ATTENDING CONTRIBUTION TO CARE
I personally evaluated the patient. I reviewed the Resident’s or Physician Assistant’s note (as assigned above), and agree with the findings and plan except as documented in my note. 18-year-old here with history of GERD status post gallbladder removal known to have first-degree AV block here for evaluation for URI signs and symptoms of viral illness discussed concern because was having somewhat more abdominal pain than usual physical exam is essentially unremarkable reassurance given and meds can DC home follow-up with GI and PCP as OPD

## 2024-01-30 NOTE — ED PROVIDER NOTE - PATIENT PORTAL LINK FT
You can access the FollowMyHealth Patient Portal offered by [facility] by registering at the following website: [email or web address]. By joining [employer]’s FollowMyHealth portal, you will also be able to view your health information using other applications (apps) compatible with our system.

## 2024-01-30 NOTE — ED PROVIDER NOTE - NSFOLLOWUPINSTRUCTIONS_ED_ALL_ED_FT
Gastroesophageal Reflux Disease, Adult    Gastroesophageal reflux (LAITH) happens when acid from the stomach flows up into the tube that connects the mouth and the stomach (esophagus). Normally, food travels down the esophagus and stays in the stomach to be digested. However, when a person has LAITH, food and stomach acid sometimes move back up into the esophagus. If this becomes a more serious problem, the person may be diagnosed with a disease called gastroesophageal reflux disease (GERD). GERD occurs when the reflux:  Happens often.  Causes frequent or severe symptoms.  Causes problems such as damage to the esophagus.  When stomach acid comes in contact with the esophagus, the acid may cause inflammation in the esophagus. Over time, GERD may create small holes (ulcers) in the lining of the esophagus.    What are the causes?  This condition is caused by a problem with the muscle between the esophagus and the stomach (lower esophageal sphincter, or LES). Normally, the LES muscle closes after food passes through the esophagus to the stomach. When the LES is weakened or abnormal, it does not close properly, and that allows food and stomach acid to go back up into the esophagus.    The LES can be weakened by certain dietary substances, medicines, and medical conditions, including:  Tobacco use.  Pregnancy.  Having a hiatal hernia.  Alcohol use.  Certain foods and beverages, such as coffee, chocolate, onions, and peppermint.  What increases the risk?  You are more likely to develop this condition if you:  Have an increased body weight.  Have a connective tissue disorder.  Take NSAIDs, such as ibuprofen.  What are the signs or symptoms?  Symptoms of this condition include:  Heartburn.  Difficult or painful swallowing and the feeling of having a lump in the throat.  A bitter taste in the mouth.  Bad breath and having a large amount of saliva.  Having an upset or bloated stomach and belching.  Chest pain. Different conditions can cause chest pain. Make sure you see your health care provider if you experience chest pain.  Shortness of breath or wheezing.  Ongoing (chronic) cough or a nighttime cough.  Wearing away of tooth enamel.  Weight loss.  How is this diagnosed?  This condition may be diagnosed based on a medical history and a physical exam. To determine if you have mild or severe GERD, your health care provider may also monitor how you respond to treatment. You may also have tests, including:  A test to examine your stomach and esophagus with a small camera (endoscopy).  A test that measures the acidity level in your esophagus.  A test that measures how much pressure is on your esophagus.  A barium swallow or modified barium swallow test to show the shape, size, and functioning of your esophagus.  How is this treated?  Treatment for this condition may vary depending on how severe your symptoms are. Your health care provider may recommend:  Changes to your diet.  Medicine.  Surgery.  The goal of treatment is to help relieve your symptoms and to prevent complications.    Follow these instructions at home:  Eating and drinking      Follow a diet as recommended by your health care provider. This may involve avoiding foods and drinks such as:  Coffee and tea, with or without caffeine.  Drinks that contain alcohol.  Energy drinks and sports drinks.  Carbonated drinks or sodas.  Chocolate and cocoa.  Peppermint and mint flavorings.  Garlic and onions.  Horseradish.  Spicy and acidic foods, including peppers, chili powder, lion powder, vinegar, hot sauces, and barbecue sauce.  Citrus fruit juices and citrus fruits, such as oranges, chai, and limes.  Tomato-based foods, such as red sauce, chili, salsa, and pizza with red sauce.  Fried and fatty foods, such as donuts, french fries, potato chips, and high-fat dressings.  High-fat meats, such as hot dogs and fatty cuts of red and white meats, such as rib eye steak, sausage, ham, and majano.  High-fat dairy items, such as whole milk, butter, and cream cheese.  Eat small, frequent meals instead of large meals.  Avoid drinking large amounts of liquid with your meals.  Avoid eating meals during the 2–3 hours before bedtime.  Avoid lying down right after you eat.  Do not exercise right after you eat.  Lifestyle      Do not use any products that contain nicotine or tobacco. These products include cigarettes, chewing tobacco, and vaping devices, such as e-cigarettes. If you need help quitting, ask your health care provider.  Try to reduce your stress by using methods such as yoga or meditation. If you need help reducing stress, ask your health care provider.  If you are overweight, reduce your weight to an amount that is healthy for you. Ask your health care provider for guidance about a safe weight loss goal.  General instructions    Pay attention to any changes in your symptoms.  Take over-the-counter and prescription medicines only as told by your health care provider. Do not take aspirin, ibuprofen, or other NSAIDs unless your health care provider told you to take these medicines.  Wear loose-fitting clothing. Do not wear anything tight around your waist that causes pressure on your abdomen.  Raise (elevate) the head of your bed about 6 inches (15 cm). You can use a wedge to do this.  Avoid bending over if this makes your symptoms worse.  Keep all follow-up visits. This is important.  Contact a health care provider if:  You have:  New symptoms.  Unexplained weight loss.  Difficulty swallowing or it hurts to swallow.  Wheezing or a persistent cough.  A hoarse voice.  Your symptoms do not improve with treatment.  Get help right away if:  You have sudden pain in your arms, neck, jaw, teeth, or back.  You suddenly feel sweaty, dizzy, or light-headed.  You have chest pain or shortness of breath.  You vomit and the vomit is green, yellow, or black, or it looks like blood or coffee grounds.  You faint.  You have stool that is red, bloody, or black.  You cannot swallow, drink, or eat.  These symptoms may represent a serious problem that is an emergency. Do not wait to see if the symptoms will go away. Get medical help right away. Call your local emergency services (911 in the [place] Do not drive yourself to the hospital.    Summary  Gastroesophageal reflux happens when acid from the stomach flows up into the esophagus. GERD is a disease in which the reflux happens often, causes frequent or severe symptoms, or causes problems such as damage to the esophagus.  Treatment for this condition may vary depending on how severe your symptoms are. Your health care provider may recommend diet and lifestyle changes, medicine, or surgery.  Contact a health care provider if you have new or worsening symptoms.  Take over-the-counter and prescription medicines only as told by your health care provider. Do not take aspirin, ibuprofen, or other NSAIDs unless your health care provider told you to do so.  Keep all follow-up visits as told by your health care provider. This is important.  This information is not intended to replace advice given to you by your health care provider. Make sure you discuss any questions you have with your health care provider.

## 2024-01-31 RX ORDER — FAMOTIDINE 10 MG/ML
20 INJECTION INTRAVENOUS DAILY
Refills: 0 | Status: DISCONTINUED | OUTPATIENT
Start: 2024-01-31 | End: 2024-01-31

## 2024-02-21 ENCOUNTER — EMERGENCY (EMERGENCY)
Facility: HOSPITAL | Age: 19
LOS: 0 days | Discharge: HOME | End: 2024-02-21
Attending: EMERGENCY MEDICINE
Payer: MEDICAID

## 2024-02-21 VITALS
RESPIRATION RATE: 18 BRPM | OXYGEN SATURATION: 99 % | DIASTOLIC BLOOD PRESSURE: 50 MMHG | HEIGHT: 62 IN | TEMPERATURE: 98 F | HEART RATE: 64 BPM | WEIGHT: 283.07 LBS | SYSTOLIC BLOOD PRESSURE: 100 MMHG

## 2024-02-21 DIAGNOSIS — Z90.49 ACQUIRED ABSENCE OF OTHER SPECIFIED PARTS OF DIGESTIVE TRACT: Chronic | ICD-10-CM

## 2024-02-21 DIAGNOSIS — Z90.49 ACQUIRED ABSENCE OF OTHER SPECIFIED PARTS OF DIGESTIVE TRACT: ICD-10-CM

## 2024-02-21 DIAGNOSIS — R07.9 CHEST PAIN, UNSPECIFIED: ICD-10-CM

## 2024-02-21 DIAGNOSIS — K21.9 GASTRO-ESOPHAGEAL REFLUX DISEASE WITHOUT ESOPHAGITIS: ICD-10-CM

## 2024-02-21 DIAGNOSIS — R10.9 UNSPECIFIED ABDOMINAL PAIN: ICD-10-CM

## 2024-02-21 DIAGNOSIS — R11.10 VOMITING, UNSPECIFIED: ICD-10-CM

## 2024-02-21 DIAGNOSIS — I44.0 ATRIOVENTRICULAR BLOCK, FIRST DEGREE: ICD-10-CM

## 2024-02-21 PROCEDURE — 93010 ELECTROCARDIOGRAM REPORT: CPT

## 2024-02-21 PROCEDURE — 99283 EMERGENCY DEPT VISIT LOW MDM: CPT | Mod: 25

## 2024-02-21 PROCEDURE — 99284 EMERGENCY DEPT VISIT MOD MDM: CPT

## 2024-02-21 PROCEDURE — 93005 ELECTROCARDIOGRAM TRACING: CPT

## 2024-02-21 RX ORDER — ONDANSETRON 8 MG/1
4 TABLET, FILM COATED ORAL ONCE
Refills: 0 | Status: COMPLETED | OUTPATIENT
Start: 2024-02-21 | End: 2024-02-21

## 2024-02-21 RX ORDER — FAMOTIDINE 10 MG/ML
20 INJECTION INTRAVENOUS DAILY
Refills: 0 | Status: DISCONTINUED | OUTPATIENT
Start: 2024-02-21 | End: 2024-02-21

## 2024-02-21 RX ORDER — SUCRALFATE 1 G
1 TABLET ORAL ONCE
Refills: 0 | Status: COMPLETED | OUTPATIENT
Start: 2024-02-21 | End: 2024-02-21

## 2024-02-21 RX ADMIN — Medication 30 MILLILITER(S): at 06:04

## 2024-02-21 RX ADMIN — Medication 1 GRAM(S): at 06:05

## 2024-02-21 RX ADMIN — ONDANSETRON 4 MILLIGRAM(S): 8 TABLET, FILM COATED ORAL at 06:06

## 2024-02-21 RX ADMIN — FAMOTIDINE 20 MILLIGRAM(S): 10 INJECTION INTRAVENOUS at 06:05

## 2024-02-21 NOTE — ED ADULT TRIAGE NOTE - CHIEF COMPLAINT QUOTE
I was having really bad chest pain and abdominal pain and my throat felt like it was tightening up. I could not sleep - patient

## 2024-02-21 NOTE — ED PROVIDER NOTE - OBJECTIVE STATEMENT
Letter mailed to patient reminding him he has outstanding orders:    Lab Frequency Next Occurrence   HEMOGLOBIN A1C Once 05/06/2020   LIPID PANEL Once 05/06/2020 19yo w hx GERD, AV block 1st degree, previous cholecystectomy presents with abdominal pain and chest pain x1 week, acutely worsened overnight.   Symptoms started a week ago a/w tactile fever, URI symptoms, abdominal pain + vomiting x1. Improved mildly. Overnight, developed sharp chest and abdo pain and came to ED for evaluation. Last took Famotidine 5p night prior. Reports she discontinued Omeprazole out of concern it's causing constipation. Recent ED visit in  for constipation, now taking every other day Dulcolax. No other meds than stated above.     Last saw GI within the month. Added Omeprazole and scoped her, remarkable "for gas" per patient. Unsure when follow up appt is next.  PMD Talha. iUTD

## 2024-02-21 NOTE — ED ADULT NURSE NOTE - NSFALLUNIVINTERV_ED_ALL_ED
Bed/Stretcher in lowest position, wheels locked, appropriate side rails in place/Call bell, personal items and telephone in reach/Instruct patient to call for assistance before getting out of bed/chair/stretcher/Non-slip footwear applied when patient is off stretcher/[place] to call system/Physically safe environment - no spills, clutter or unnecessary equipment/Purposeful proactive rounding/Room/bathroom lighting operational, light cord in reach

## 2024-02-21 NOTE — ED PROVIDER NOTE - PHYSICAL EXAMINATION
Discharge Physical Exam:  General: well-appearing, awake, alert  HEENT: NCAT, EOMI, no scleral icterus, MMM, no congestion  Lung: CTABL, no stridor at this time, no tachypnea, retractions, nasal flaring  Heart: RRR, +S1/S2, No m/r/g  Abdomen: soft, ND, +BS, +reducible umb hernia, TTP epigastric area  MSK: 2+ peripheral pulses, <2 sec cap refill, no cyanosis or edema, +reproducible chest pain at costochondral regions b/l

## 2024-02-21 NOTE — ED PROVIDER NOTE - PATIENT PORTAL LINK FT
You can access the FollowMyHealth Patient Portal offered by [facility] by registering at the following website: [email or web address]. By joining [employer]’s FollowMyHealth portal, you will also be able to view your health information using other applications (apps) compatible with our system.

## 2024-02-21 NOTE — ED PROVIDER NOTE - NSFOLLOWUPINSTRUCTIONS_ED_ALL_ED_FT
1. Follow up with PMD in 1-2 days  2. Follow up with GI for further management recommendations  3. Continue home medications of Famotidine, Dulcolax.    Abdominal Pain    Many things can cause abdominal pain. Usually, abdominal pain is not caused by a disease and will improve without treatment. Your health care provider will do a physical exam and possibly order blood tests and imaging to help determine the seriousness of your pain. However, in many cases, no cause may be found and you may need further testing as an outpatient. Monitor your abdominal pain for any changes.     SEEK IMMEDIATE MEDICAL CARE IF YOU HAVE THE FOLLOWING SYMPTOMS: worsening abdominal pain, vomiting, diarrhea, inability to have bowel movements or pass gas, black or bloody stool, fever accompanying chest pain or back pain, or dizziness/lightheadedness.      Contact a health care provider if:    •Your child's abdominal pain changes or gets worse.      •Your child is not hungry, or your child loses weight without trying.      •Your child is constipated or has diarrhea for more than 2–3 days.      •Your child has pain when he or she urinates or has a bowel movement.      •Pain wakes your child up at night.      •Your child's pain gets worse with meals, after eating, or with certain foods.      •Your child vomits.      •Your child who is 3 months to 3 years old has a temperature of 102.2°F (39°C) or higher.        Get help right away if:    •Your child's pain does not go away as soon as your child's health care provider told you to expect.      •Your child cannot stop vomiting.      •Your child's pain stays in one area of the abdomen. Pain on the right side could be caused by appendicitis.      •Your child has bloody or black stools, stools that look like tar, or blood in his or her urine.      •Your child who is younger than 3 months has a temperature of 100.4°F (38°C) or higher.      •Your child has severe abdominal pain, cramping, or bloating.    •You notice signs of dehydration in your child who is one year old or younger, such as:  •A sunken soft spot on his or her head.      •No wet diapers in 6 hours.      •Increased fussiness.      •No urine in 8 hours.      •Cracked lips.      •Not making tears while crying.      •Dry mouth.      •Sunken eyes.      •Sleepiness.      •You notice signs of dehydration in your child who is one year old or older, such as:  •No urine in 8–12 hours.      •Cracked lips.      •Not making tears while crying.      •Dry mouth.      •Sunken eyes.      •Sleepiness.      •Weakness. 1. Follow up with PMD in 1-2 days  2. Follow up with GI for further management recommendations  3. Continue home medications of Famotidine, Omeprazole and Dulcolax as prescribed.    Abdominal Pain    Many things can cause abdominal pain. Usually, abdominal pain is not caused by a disease and will improve without treatment. Your health care provider will do a physical exam and possibly order blood tests and imaging to help determine the seriousness of your pain. However, in many cases, no cause may be found and you may need further testing as an outpatient. Monitor your abdominal pain for any changes.     SEEK IMMEDIATE MEDICAL CARE IF YOU HAVE THE FOLLOWING SYMPTOMS: worsening abdominal pain, vomiting, diarrhea, inability to have bowel movements or pass gas, black or bloody stool, fever accompanying chest pain or back pain, or dizziness/lightheadedness.      Contact a health care provider if:    •Your child's abdominal pain changes or gets worse.      •Your child is not hungry, or your child loses weight without trying.      •Your child is constipated or has diarrhea for more than 2–3 days.      •Your child has pain when he or she urinates or has a bowel movement.      •Pain wakes your child up at night.      •Your child's pain gets worse with meals, after eating, or with certain foods.      •Your child vomits.      •Your child who is 3 months to 3 years old has a temperature of 102.2°F (39°C) or higher.        Get help right away if:    •Your child's pain does not go away as soon as your child's health care provider told you to expect.      •Your child cannot stop vomiting.      •Your child's pain stays in one area of the abdomen. Pain on the right side could be caused by appendicitis.      •Your child has bloody or black stools, stools that look like tar, or blood in his or her urine.      •Your child who is younger than 3 months has a temperature of 100.4°F (38°C) or higher.      •Your child has severe abdominal pain, cramping, or bloating.    •You notice signs of dehydration in your child who is one year old or younger, such as:  •A sunken soft spot on his or her head.      •No wet diapers in 6 hours.      •Increased fussiness.      •No urine in 8 hours.      •Cracked lips.      •Not making tears while crying.      •Dry mouth.      •Sunken eyes.      •Sleepiness.      •You notice signs of dehydration in your child who is one year old or older, such as:  •No urine in 8–12 hours.      •Cracked lips.      •Not making tears while crying.      •Dry mouth.      •Sunken eyes.      •Sleepiness.      •Weakness.

## 2024-02-21 NOTE — ED PROVIDER NOTE - CLINICAL SUMMARY MEDICAL DECISION MAKING FREE TEXT BOX
17 yo F, hx of GERD, sp GI eval, endoscopy without signs of PUD, gastritis, currently on daily PPI, H2 here for assessment of episode of epigastric pain with radiation to chest. Patient has had this pain for >1 year. Tonight however it was worse and prevented her from sleeping, prompting ED visit. No dyspnea, nausea, vomiting, diaphoresis. Pain no radiating.    VS normal, on exam is anxious but well appearing, has clear lungs, RRR, soft, NT, ND abdomen, no rebound or guarding, MMM.    EKG sinus with 1st degree AV block.    Patietn received oral GI cocktail with improvement in sx. Exam and hx suggestive of exacerbation of GERD, no signs of acute intra-abdominal pathology such as pancreatitis, retained kidney stone.    Advised on bland diet, food journal, continued GI meds/follow up and return precautions.

## 2024-02-21 NOTE — ED PROVIDER NOTE - PROGRESS NOTE DETAILS
Carafate, Maalox, Zofran, Famotidine to be given then reassess  ECG - 1st deg AV block Carafate, Maalox, Zofran, Famotidine to be given then reassess >> pain improved  ECG - 1st deg AV block

## 2024-02-26 NOTE — ED PROVIDER NOTE - NPI NUMBER (FOR SYSADMIN USE ONLY) :
I spoke with [facility] and they do treat Hep C.   Therefore referral was made.     [[id number]]

## 2024-02-27 ENCOUNTER — EMERGENCY (EMERGENCY)
Facility: HOSPITAL | Age: 19
LOS: 0 days | Discharge: HOME | End: 2024-02-27
Attending: EMERGENCY MEDICINE
Payer: MEDICAID

## 2024-02-27 VITALS
DIASTOLIC BLOOD PRESSURE: 58 MMHG | TEMPERATURE: 98 F | HEIGHT: 62 IN | RESPIRATION RATE: 16 BRPM | OXYGEN SATURATION: 98 % | WEIGHT: 279.99 LBS | SYSTOLIC BLOOD PRESSURE: 125 MMHG | HEART RATE: 79 BPM

## 2024-02-27 DIAGNOSIS — G89.29 OTHER CHRONIC PAIN: ICD-10-CM

## 2024-02-27 DIAGNOSIS — R10.13 EPIGASTRIC PAIN: ICD-10-CM

## 2024-02-27 DIAGNOSIS — K21.9 GASTRO-ESOPHAGEAL REFLUX DISEASE WITHOUT ESOPHAGITIS: ICD-10-CM

## 2024-02-27 DIAGNOSIS — K59.00 CONSTIPATION, UNSPECIFIED: ICD-10-CM

## 2024-02-27 DIAGNOSIS — Z90.49 ACQUIRED ABSENCE OF OTHER SPECIFIED PARTS OF DIGESTIVE TRACT: Chronic | ICD-10-CM

## 2024-02-27 PROCEDURE — 99283 EMERGENCY DEPT VISIT LOW MDM: CPT

## 2024-02-27 RX ORDER — FAMOTIDINE 10 MG/ML
20 INJECTION INTRAVENOUS ONCE
Refills: 0 | Status: COMPLETED | OUTPATIENT
Start: 2024-02-27 | End: 2024-02-27

## 2024-02-27 RX ORDER — ACETAMINOPHEN 500 MG
650 TABLET ORAL ONCE
Refills: 0 | Status: COMPLETED | OUTPATIENT
Start: 2024-02-27 | End: 2024-02-27

## 2024-02-27 RX ORDER — DIPHENHYDRAMINE HYDROCHLORIDE AND LIDOCAINE HYDROCHLORIDE AND ALUMINUM HYDROXIDE AND MAGNESIUM HYDRO
30 KIT ONCE
Refills: 0 | Status: COMPLETED | OUTPATIENT
Start: 2024-02-27 | End: 2024-02-27

## 2024-02-27 RX ADMIN — Medication 1 ENEMA: at 03:27

## 2024-02-27 RX ADMIN — FAMOTIDINE 20 MILLIGRAM(S): 10 INJECTION INTRAVENOUS at 01:45

## 2024-02-27 RX ADMIN — DIPHENHYDRAMINE HYDROCHLORIDE AND LIDOCAINE HYDROCHLORIDE AND ALUMINUM HYDROXIDE AND MAGNESIUM HYDRO 30 MILLILITER(S): KIT at 03:26

## 2024-02-27 RX ADMIN — Medication 650 MILLIGRAM(S): at 01:45

## 2024-02-27 NOTE — ED PROVIDER NOTE - PATIENT PORTAL LINK FT
You can access the FollowMyHealth Patient Portal offered by [facility] by registering at the following website: [email or web address]. By joining [employer]’s FollowMyHealth portal, you will also be able to view your health information using other applications (apps) compatible with our system.

## 2024-02-27 NOTE — ED PROVIDER NOTE - PHYSICAL EXAMINATION
VITAL SIGNS: I have reviewed nursing notes and confirm.  CONSTITUTIONAL: Well-developed; well-nourished; in no acute distress.  SKIN: Skin exam is warm and dry, no acute rash.  HEAD: Normocephalic; atraumatic.  EYES: PERRL, EOM intact; conjunctiva and sclera clear.  ENT: No nasal discharge; airway clear.   CARD: S1, S2 normal; no murmurs, gallops, or rubs. Regular rate and rhythm.  RESP: No wheezes, rales or rhonchi.  ABD: Normal bowel sounds; soft; non-distended; non-tender  RECTAL: declined  EXT: Normal ROM.   NEURO: Alert, oriented. Grossly unremarkable. No focal deficits.  PSYCH: Cooperative, appropriate.

## 2024-02-27 NOTE — ED PROVIDER NOTE - OBJECTIVE STATEMENT
19 yo F, hx of GERD, sp GI eval, endoscopy without signs of PUD, gastritis, currently on daily PPI, H2 here for assessment of episode of epigastric pain with constipation. These sx are chronic and recurring. Tonight however it was worse and prevented her from sleeping, prompting ED visit. No dyspnea, nausea, vomiting. Pain does not radiate.    Notes she has hard stool which she can feel when she strains to move bowels.

## 2024-02-27 NOTE — ED ADULT NURSE NOTE - NSFALLUNIVINTERV_ED_ALL_ED
Bed/Stretcher in lowest position, wheels locked, appropriate side rails in place/Call bell, personal items and telephone in reach/Instruct patient to call for assistance before getting out of bed/chair/stretcher/Non-slip footwear applied when patient is off stretcher/[place] to call system/Physically safe environment - no spills, clutter or unnecessary equipment/Purposeful proactive rounding/Room/bathroom lighting operational, light cord in reach

## 2024-02-27 NOTE — ED PROVIDER NOTE - CLINICAL SUMMARY MEDICAL DECISION MAKING FREE TEXT BOX
Patient resting comfortably, continues to have soft, NT abdomen.    Advised on continued use to GI cocktail, GI follow up, return precautions.

## 2024-04-16 NOTE — ED PROVIDER NOTE - NSFOLLOWUPINSTRUCTIONS_ED_ALL_ED_FT
Vitals  T(F): 97.8 (04-16-24 @ 01:00), Max: 97.8 (04-16-24 @ 01:00)  HR: 69 (04-16-24 @ 01:00) (68 - 69)  BP: 156/64 (04-16-24 @ 01:00) (156/64 - 159/75)  RR: 20 (04-16-24 @ 01:00) (17 - 20)  SpO2: 98% (04-16-24 @ 01:00) (98% - 98%)    PHYSICAL EXAM   Gen: NAD, comfortable, AA&Ox4  HEENT: left sided scalp with laceration 3cm, no active bleeding, EOMI  CVS: +s1, s2, regular rate and rhythm, diastolic murmur   RESP: normal respiratory effort, clear to auscultation b/l, no wheezes/crackles/rhonchi  GI: soft, non-tender, non-distended, +bowel sounds in all 4 quadrants  Extremities: no pitting edema, with left thigh tenderness, no bruises/ wound appreciated   Skin: nl warm and dry  Neuro: answering questions appropriately, face symmetric, move all 4 extremities spontaneously Vitals  T(F): 97.8 (04-16-24 @ 01:00), Max: 97.8 (04-16-24 @ 01:00)  HR: 69 (04-16-24 @ 01:00) (68 - 69)  BP: 156/64 (04-16-24 @ 01:00) (156/64 - 159/75)  RR: 20 (04-16-24 @ 01:00) (17 - 20)  SpO2: 98% (04-16-24 @ 01:00) (98% - 98%)    PHYSICAL EXAM   Gen: NAD, comfortable, AA&Ox4  HEENT: left sided scalp with laceration 3cm, no active bleeding, EOMI  CVS: +s1, s2, regular rate and rhythm, diastolic murmur   RESP: normal respiratory effort, clear to auscultation b/l, no wheezes/crackles/rhonchi  GI: soft, non-tender, non-distended, +bowel sounds in all 4 quadrants  Extremities: no pitting edema, with left thigh and knee tenderness, no bruises/ wound appreciated   Skin: nl warm and dry  Neuro: answering questions appropriately, face symmetric, move all 4 extremities spontaneously Vitals  T(F): 97.8 (04-16-24 @ 01:00), Max: 97.8 (04-16-24 @ 01:00)  HR: 69 (04-16-24 @ 01:00) (68 - 69)  BP: 156/64 (04-16-24 @ 01:00) (156/64 - 159/75)  RR: 20 (04-16-24 @ 01:00) (17 - 20)  SpO2: 98% (04-16-24 @ 01:00) (98% - 98%)    PHYSICAL EXAM   Gen: NAD, comfortable, AA&Ox2  HEENT: left sided scalp with laceration 3cm, no active bleeding, EOMI  CVS: +s1, s2, regular rate and rhythm, diastolic murmur   RESP: normal respiratory effort, clear to auscultation b/l, no wheezes/crackles/rhonchi  GI: soft, non-tender, non-distended, +bowel sounds in all 4 quadrants  Extremities: no pitting edema, with left thigh and knee tenderness, no bruises/ wound appreciated   Skin: nl warm and dry  Neuro: face symmetric, move all 4 extremities spontaneously Abdominal Pain    Many things can cause abdominal pain. Usually, abdominal pain is not caused by a disease and will improve without treatment. Your health care provider will do a physical exam and possibly order blood tests and imaging to help determine the seriousness of your pain. However, in many cases, no cause may be found and you may need further testing as an outpatient. Monitor your abdominal pain for any changes.     SEEK IMMEDIATE MEDICAL CARE IF YOU HAVE THE FOLLOWING SYMPTOMS: worsening abdominal pain, vomiting, diarrhea, inability to have bowel movements or pass gas, black or bloody stool, fever accompanying chest pain or back pain, or dizziness/lightheadedness.

## 2025-03-07 NOTE — ED PEDIATRIC NURSE NOTE - DOES PATIENT HAVE ADVANCE DIRECTIVE
Called patient and made aware of results or MRI. Agreeable with referral to . referral placed.    No